# Patient Record
Sex: FEMALE | Race: BLACK OR AFRICAN AMERICAN | NOT HISPANIC OR LATINO | Employment: FULL TIME | URBAN - METROPOLITAN AREA
[De-identification: names, ages, dates, MRNs, and addresses within clinical notes are randomized per-mention and may not be internally consistent; named-entity substitution may affect disease eponyms.]

---

## 2017-01-12 RX ORDER — DROSPIRENONE AND ETHINYL ESTRADIOL TABLETS 0.02-3(28)
KIT ORAL
Qty: 28 TABLET | Refills: 0 | OUTPATIENT
Start: 2017-01-12

## 2017-01-19 ENCOUNTER — OFFICE VISIT (OUTPATIENT)
Dept: FAMILY MEDICINE | Facility: CLINIC | Age: 32
End: 2017-01-19
Payer: COMMERCIAL

## 2017-01-19 VITALS
OXYGEN SATURATION: 100 % | WEIGHT: 220.69 LBS | SYSTOLIC BLOOD PRESSURE: 124 MMHG | RESPIRATION RATE: 18 BRPM | HEIGHT: 63 IN | DIASTOLIC BLOOD PRESSURE: 82 MMHG | HEART RATE: 101 BPM | TEMPERATURE: 98 F | BODY MASS INDEX: 39.1 KG/M2

## 2017-01-19 DIAGNOSIS — J30.9 ALLERGIC RHINITIS, CAUSE UNSPECIFIED: Primary | ICD-10-CM

## 2017-01-19 DIAGNOSIS — J45.909 ASTHMA IN ADULT, UNSPECIFIED ASTHMA SEVERITY, UNCOMPLICATED: ICD-10-CM

## 2017-01-19 PROCEDURE — 96372 THER/PROPH/DIAG INJ SC/IM: CPT | Mod: S$GLB,,, | Performed by: REGISTERED NURSE

## 2017-01-19 PROCEDURE — 99999 PR PBB SHADOW E&M-EST. PATIENT-LVL III: CPT | Mod: PBBFAC,,, | Performed by: REGISTERED NURSE

## 2017-01-19 PROCEDURE — 1159F MED LIST DOCD IN RCRD: CPT | Mod: S$GLB,,, | Performed by: REGISTERED NURSE

## 2017-01-19 PROCEDURE — 99213 OFFICE O/P EST LOW 20 MIN: CPT | Mod: 25,S$GLB,, | Performed by: REGISTERED NURSE

## 2017-01-19 RX ORDER — PROMETHAZINE HYDROCHLORIDE AND DEXTROMETHORPHAN HYDROBROMIDE 6.25; 15 MG/5ML; MG/5ML
5 SYRUP ORAL
Qty: 180 ML | Refills: 0 | Status: SHIPPED | OUTPATIENT
Start: 2017-01-19 | End: 2017-03-09

## 2017-01-19 RX ORDER — BETAMETHASONE SODIUM PHOSPHATE AND BETAMETHASONE ACETATE 3; 3 MG/ML; MG/ML
12 INJECTION, SUSPENSION INTRA-ARTICULAR; INTRALESIONAL; INTRAMUSCULAR; SOFT TISSUE
Status: COMPLETED | OUTPATIENT
Start: 2017-01-19 | End: 2017-01-19

## 2017-01-19 RX ORDER — LEVOCETIRIZINE DIHYDROCHLORIDE 5 MG/1
5 TABLET, FILM COATED ORAL EVERY MORNING
Qty: 30 TABLET | Refills: 6 | Status: SHIPPED | OUTPATIENT
Start: 2017-01-19 | End: 2017-04-28

## 2017-01-19 RX ORDER — PANTOPRAZOLE SODIUM 40 MG/1
40 TABLET, DELAYED RELEASE ORAL DAILY
Refills: 6 | COMMUNITY
Start: 2016-11-25 | End: 2017-11-07

## 2017-01-19 RX ORDER — MONTELUKAST SODIUM 10 MG/1
10 TABLET ORAL NIGHTLY
Qty: 30 TABLET | Refills: 6 | Status: SHIPPED | OUTPATIENT
Start: 2017-01-19 | End: 2017-02-18

## 2017-01-19 RX ADMIN — BETAMETHASONE SODIUM PHOSPHATE AND BETAMETHASONE ACETATE 12 MG: 3; 3 INJECTION, SUSPENSION INTRA-ARTICULAR; INTRALESIONAL; INTRAMUSCULAR; SOFT TISSUE at 12:01

## 2017-01-19 NOTE — PROGRESS NOTES
"Subjective:       Patient ID: Ralph Hughes is a 31 y.o. female.    Chief Complaint: Cough and Asthma    HPI     Ralph is here today with c/o cough and cold symptoms x 1 week.  OTC meds help some but make too drowsy.  Reports RN, NC, sneezing, PND, itchy watery eyes.  Cough productive, with clear thick mucus, increased PM.  No wheezing, started MDI today.    Review of Systems   Constitutional: Negative for chills and fever.   HENT: Positive for congestion, postnasal drip, rhinorrhea and sneezing. Negative for ear pain, sinus pressure and sore throat.    Eyes: Positive for discharge and itching. Negative for photophobia, pain, redness and visual disturbance.   Respiratory: Positive for cough. Negative for shortness of breath and wheezing.    Cardiovascular: Negative.    Allergic/Immunologic: Positive for environmental allergies.   Neurological: Negative.    Hematological: Negative for adenopathy.       Objective:         Vitals:    01/19/17 1145   BP: 124/82   Pulse: 101   Resp: 18   Temp: 97.6 °F (36.4 °C)   TempSrc: Tympanic   SpO2: 100%   Weight: 100.1 kg (220 lb 10.9 oz)   Height: 5' 3" (1.6 m)       Physical Exam   Constitutional: She is oriented to person, place, and time. She appears well-developed and well-nourished.   HENT:   Head: Normocephalic and atraumatic.   Right Ear: Tympanic membrane, external ear and ear canal normal.   Left Ear: Tympanic membrane, external ear and ear canal normal.   Nose: Mucosal edema and rhinorrhea (boggy, clear RN) present. No epistaxis. Right sinus exhibits no maxillary sinus tenderness and no frontal sinus tenderness. Left sinus exhibits no maxillary sinus tenderness and no frontal sinus tenderness.   Mouth/Throat: Oropharynx is clear and moist.   Cardiovascular: Normal rate, regular rhythm and normal heart sounds.  Exam reveals no gallop and no friction rub.    No murmur heard.  Pulmonary/Chest: Effort normal and breath sounds normal. No respiratory distress. She " has no wheezes. She exhibits no tenderness.   Neurological: She is alert and oriented to person, place, and time.       Assessment:       1. Allergic rhinitis, cause unspecified    2. Asthma in adult, unspecified asthma severity, uncomplicated        Plan:       Ralph was seen today for cough and asthma.    Diagnoses and all orders for this visit:    Allergic rhinitis, cause unspecified  -     betamethasone acetate-betamethasone sodium phosphate injection 12 mg; Inject 2 mLs (12 mg total) into the muscle one time.  -     levocetirizine (XYZAL) 5 MG tablet; Take 1 tablet (5 mg total) by mouth every morning.  -     montelukast (SINGULAIR) 10 mg tablet; Take 1 tablet (10 mg total) by mouth every evening.  -     promethazine-dextromethorphan (PROMETHAZINE-DM) 6.25-15 mg/5 mL Syrp; Take 5 mLs by mouth every 4 to 6 hours as needed.    Asthma in adult, unspecified asthma severity, uncomplicated  -     betamethasone acetate-betamethasone sodium phosphate injection 12 mg; Inject 2 mLs (12 mg total) into the muscle one time.  -     montelukast (SINGULAIR) 10 mg tablet; Take 1 tablet (10 mg total) by mouth every evening.  -     promethazine-dextromethorphan (PROMETHAZINE-DM) 6.25-15 mg/5 mL Syrp; Take 5 mLs by mouth every 4 to 6 hours as needed.      Continue MDI as needed.  Symptomatic care, rest and fluids.  RTC prn.

## 2017-01-19 NOTE — MR AVS SNAPSHOT
[facility]  [address] [place] [address]  Phone: [phone]                  Ralph Hughes   2017 1:30 PM   Office Visit    Description:  Female : 1985   Provider:  Vinnie Dominique NP   Department:  [facility]           Reason for Visit     Cough     Asthma           Diagnoses this Visit        Comments    Allergic rhinitis, cause unspecified    -  Primary     Asthma in adult, unspecified asthma severity, uncomplicated                To Do List           Future Appointments        Provider Department Dept Phone    2017 1:30 PM Vinnie Dominique NP [facility] [phone]      Goals (5 Years of Data)     None       These Medications        Disp Refills Start End    levocetirizine (XYZAL) 5 MG tablet 30 tablet 6 2017    Take 1 tablet (5 mg total) by mouth every morning. - Oral    Pharmacy: [facility]/pharmacy #[id number] MENDY Talbot [address] Ph #: [phone]       montelukast (SINGULAIR) 10 mg tablet 30 tablet 6 2017    Take 1 tablet (10 mg total) by mouth every evening. - Oral    Pharmacy: [facility]/pharmacy #[id number] MENDY Talbot [address] Ph #: [phone]       promethazine-dextromethorphan (PROMETHAZINE-DM) 6.25-15 mg/5 mL Syrp 180 mL 0 2017     Take 5 mLs by mouth every 4 to 6 hours as needed. - Oral    Pharmacy: [facility]/pharmacy #[id number] MENDY Talbot [address] Ph #: [phone]         [facility]     [facility] On Call Nurse Care Line -  Assistance  Registered nurses in the [facility] provide clinical advisement, health education, appointment booking, and other advisory services.  Call for this free service at [phone].             Medications           Message regarding Medications     Verify the changes and/or additions to your medication regime listed below are the same as discussed with your clinician today.   "If any of these changes or additions are incorrect, please notify your healthcare provider.        START taking these NEW medications        Refills    levocetirizine (XYZAL) 5 MG tablet 6    Sig: Take 1 tablet (5 mg total) by mouth every morning.    Class: Normal    Route: Oral    montelukast (SINGULAIR) 10 mg tablet 6    Sig: Take 1 tablet (10 mg total) by mouth every evening.    Class: Normal    Route: Oral    promethazine-dextromethorphan (PROMETHAZINE-DM) 6.25-15 mg/5 mL Syrp 0    Sig: Take 5 mLs by mouth every 4 to 6 hours as needed.    Class: Normal    Route: Oral      These medications were administered today        Dose Freq    betamethasone acetate-betamethasone sodium phosphate injection 12 mg 12 mg Clinic[facility] 1 time    Sig: Inject 2 mLs (12 mg total) into the muscle one time.    Class: Normal    Route: Intramuscular           Verify that the below list of medications is an accurate representation of the medications you are currently taking.  If none reported, the list may be blank. If incorrect, please contact your healthcare provider. Carry this list with you in case of emergency.           Current Medications     pantoprazole (PROTONIX) 40 MG tablet Take 40 mg by mouth once daily.    drospirenone-ethinyl estradiol (NANI) 3-0.02 mg per tablet Take 1 tablet by mouth once daily.    levocetirizine (XYZAL) 5 MG tablet Take 1 tablet (5 mg total) by mouth every morning.    montelukast (SINGULAIR) 10 mg tablet Take 1 tablet (10 mg total) by mouth every evening.    promethazine-dextromethorphan (PROMETHAZINE-DM) 6.25-15 mg/5 mL Syrp Take 5 mLs by mouth every 4 to 6 hours as needed.           Clinical Reference Information           Vital Signs - Last Recorded  Most recent update: 1/19/2017 11:46 AM by Rand Chen MA    BP Pulse Temp Resp Ht Wt    124/82 101 97.6 °F (36.4 °C) (Tympanic) 18 5' 3" (1.6 m) 100.1 kg (220 lb 10.9 oz)    LMP SpO2 Breastfeeding? BMI       01/15/2017 100% No 39.09 kg/m2       Blood " Pressure          Most Recent Value    BP  124/82      Allergies as of 1/19/2017     No Known Allergies      Immunizations Administered on Date of Encounter - 1/19/2017     None

## 2017-02-21 ENCOUNTER — TELEPHONE (OUTPATIENT)
Dept: FAMILY MEDICINE | Facility: CLINIC | Age: 32
End: 2017-02-21

## 2017-02-21 RX ORDER — TRAZODONE HYDROCHLORIDE 50 MG/1
50 TABLET ORAL NIGHTLY PRN
Qty: 30 TABLET | Refills: 2 | Status: SHIPPED | OUTPATIENT
Start: 2017-02-21 | End: 2017-04-28

## 2017-02-21 NOTE — TELEPHONE ENCOUNTER
----- Message from Mame Perez sent at 2/21/2017 10:15 AM CST -----  Contact: Pt  Pt states she is returning nurse call, please contact pt at [phone] or [phone]

## 2017-02-21 NOTE — TELEPHONE ENCOUNTER
Spoke w/ patient today.  Mother had been ill for some time, passed away last week.  Has not been resting well, Aleve-PM not helping.  Requests sleep med.  Also wishes to see someone to discuss anxiety/stress issues.  Will send in RX for trazodone to pharmacy.  Number provided for Dr. Venegas office for her to schedule appointment.

## 2017-03-09 ENCOUNTER — PATIENT MESSAGE (OUTPATIENT)
Dept: FAMILY MEDICINE | Facility: CLINIC | Age: 32
End: 2017-03-09

## 2017-03-09 ENCOUNTER — OFFICE VISIT (OUTPATIENT)
Dept: FAMILY MEDICINE | Facility: CLINIC | Age: 32
End: 2017-03-09
Payer: COMMERCIAL

## 2017-03-09 DIAGNOSIS — R39.9 UTI SYMPTOMS: ICD-10-CM

## 2017-03-09 DIAGNOSIS — N39.0 ACUTE UTI (URINARY TRACT INFECTION): Primary | ICD-10-CM

## 2017-03-09 LAB
BILIRUB SERPL-MCNC: NORMAL MG/DL
BLOOD URINE, POC: 250
COLOR, POC UA: YELLOW
GLUCOSE UR QL STRIP: NORMAL
KETONES UR QL STRIP: NEGATIVE
LEUKOCYTE ESTERASE URINE, POC: POSITIVE
NITRITE, POC UA: NEGATIVE
PH, POC UA: 6
PROTEIN, POC: 30
SPECIFIC GRAVITY, POC UA: 1.01
UROBILINOGEN, POC UA: NEGATIVE

## 2017-03-09 PROCEDURE — 99999 PR PBB SHADOW E&M-EST. PATIENT-LVL III: CPT | Mod: PBBFAC,,, | Performed by: REGISTERED NURSE

## 2017-03-09 PROCEDURE — 1160F RVW MEDS BY RX/DR IN RCRD: CPT | Mod: S$GLB,,, | Performed by: REGISTERED NURSE

## 2017-03-09 PROCEDURE — 99213 OFFICE O/P EST LOW 20 MIN: CPT | Mod: 25,S$GLB,, | Performed by: REGISTERED NURSE

## 2017-03-09 PROCEDURE — 81002 URINALYSIS NONAUTO W/O SCOPE: CPT | Mod: S$GLB,,, | Performed by: REGISTERED NURSE

## 2017-03-09 RX ORDER — CIPROFLOXACIN 250 MG/1
250 TABLET, FILM COATED ORAL 2 TIMES DAILY
Qty: 6 TABLET | Refills: 0 | Status: SHIPPED | OUTPATIENT
Start: 2017-03-09 | End: 2017-03-12

## 2017-03-09 RX ORDER — PHENAZOPYRIDINE HYDROCHLORIDE 200 MG/1
200 TABLET, FILM COATED ORAL
Qty: 6 TABLET | Refills: 0 | Status: SHIPPED | OUTPATIENT
Start: 2017-03-09 | End: 2017-03-11

## 2017-03-13 VITALS
HEART RATE: 99 BPM | RESPIRATION RATE: 18 BRPM | HEIGHT: 63 IN | SYSTOLIC BLOOD PRESSURE: 98 MMHG | OXYGEN SATURATION: 99 % | WEIGHT: 219.13 LBS | BODY MASS INDEX: 38.83 KG/M2 | TEMPERATURE: 99 F | DIASTOLIC BLOOD PRESSURE: 76 MMHG

## 2017-03-13 NOTE — PROGRESS NOTES
"Subjective:       Patient ID: Ralph Hughes is a 31 y.o. female.    Chief Complaint: Urinary Tract Infection    HPI     Ralph is here today with UTI symptoms x 4 to 5 days.  Drinking more water lately.  Reports burning w/ urination, odor and urgency.    Review of Systems   Constitutional: Negative for chills and fever.   Respiratory: Negative.    Cardiovascular: Negative.    Gastrointestinal: Negative for nausea and vomiting.   Genitourinary: Positive for dysuria, frequency and urgency. Negative for flank pain and hematuria.   Musculoskeletal: Negative for back pain.   Neurological: Negative.        Objective:         Vitals:    03/09/17 1629   BP: 98/76   Pulse: 99   Resp: 18   Temp: 99.4 °F (37.4 °C)   TempSrc: Tympanic   SpO2: 99%   Weight: 99.4 kg (219 lb 2.2 oz)   Height: 5' 3" (1.6 m)       Physical Exam   Constitutional: She is oriented to person, place, and time. She appears well-developed and well-nourished.   Abdominal: There is tenderness in the suprapubic area. There is no CVA tenderness.   Neurological: She is alert and oriented to person, place, and time.       Component      Latest Ref Rng & Units 3/9/2017   Color, UA       yellow   Specific [place], UA       1.010   pH, UA       6   WBC, UA       positive   Nitrite, UA       negative   Protein, UA       30   Glucose, UA       normal   Ketones, UA       negative   Urobilinogen, UA       negative   Bilirubin, UA       normal   RBC, UA       250       Assessment:       1. Acute UTI (urinary tract infection)    2. UTI symptoms        Plan:       Ralph was seen today for urinary tract infection.    Diagnoses and all orders for this visit:    Acute UTI (urinary tract infection)  -     ciprofloxacin HCl (CIPRO) 250 MG tablet; Take 1 tablet (250 mg total) by mouth 2 (two) times daily.  -     phenazopyridine (PYRIDIUM) 200 MG tablet; Take 1 tablet (200 mg total) by mouth 3 (three) times daily with meals.    UTI symptoms  -     POCT URINE DIPSTICK " WITHOUT MICROSCOPE  -     ciprofloxacin HCl (CIPRO) 250 MG tablet; Take 1 tablet (250 mg total) by mouth 2 (two) times daily.  -     phenazopyridine (PYRIDIUM) 200 MG tablet; Take 1 tablet (200 mg total) by mouth 3 (three) times daily with meals.      Infection triggers and prevention discussed.  RTC prn.

## 2017-03-23 ENCOUNTER — OFFICE VISIT (OUTPATIENT)
Dept: FAMILY MEDICINE | Facility: CLINIC | Age: 32
End: 2017-03-23
Payer: COMMERCIAL

## 2017-03-23 VITALS
TEMPERATURE: 99 F | BODY MASS INDEX: 39.14 KG/M2 | HEART RATE: 99 BPM | DIASTOLIC BLOOD PRESSURE: 82 MMHG | RESPIRATION RATE: 18 BRPM | HEIGHT: 63 IN | WEIGHT: 220.88 LBS | SYSTOLIC BLOOD PRESSURE: 132 MMHG | OXYGEN SATURATION: 98 %

## 2017-03-23 DIAGNOSIS — R20.2 PARESTHESIA: Primary | ICD-10-CM

## 2017-03-23 DIAGNOSIS — D50.9 IRON DEFICIENCY ANEMIA, UNSPECIFIED IRON DEFICIENCY ANEMIA TYPE: ICD-10-CM

## 2017-03-23 DIAGNOSIS — M79.10 MYALGIA: ICD-10-CM

## 2017-03-23 DIAGNOSIS — E55.9 VITAMIN D DEFICIENCY DISEASE: ICD-10-CM

## 2017-03-23 PROCEDURE — 1160F RVW MEDS BY RX/DR IN RCRD: CPT | Mod: S$GLB,,, | Performed by: REGISTERED NURSE

## 2017-03-23 PROCEDURE — 99999 PR PBB SHADOW E&M-EST. PATIENT-LVL III: CPT | Mod: PBBFAC,,, | Performed by: REGISTERED NURSE

## 2017-03-23 PROCEDURE — 99213 OFFICE O/P EST LOW 20 MIN: CPT | Mod: S$GLB,,, | Performed by: REGISTERED NURSE

## 2017-03-23 RX ORDER — PHENAZOPYRIDINE HYDROCHLORIDE 200 MG/1
TABLET, FILM COATED ORAL
Refills: 0 | COMMUNITY
Start: 2017-03-09 | End: 2017-03-23

## 2017-03-23 NOTE — MR AVS SNAPSHOT
[facility]  [address] [address]  Phone: [phone]                  Ralph Hughes   3/23/2017 4:45 PM   Office Visit    Description:  Female : 1985   Provider:  Vinnie Dominique NP   Department:  [facility]           Reason for Visit     Joint Pain     Tingling           Diagnoses this Visit        Comments    Paresthesia    -  Primary            To Do List           Goals (5 Years of Data)     None      [facility]     [facility] Nurse Care Line -  Assistance  Registered nurses in the [facility] On Call Center provide clinical advisement, health education, appointment booking, and other advisory services.  Call for this free service at [phone].             Medications           Message regarding Medications     Verify the changes and/or additions to your medication regime listed below are the same as discussed with your clinician today.  If any of these changes or additions are incorrect, please notify your healthcare provider.        STOP taking these medications     phenazopyridine (PYRIDIUM) 200 MG tablet TAKE 1 TABLET (200 MG TOTAL) BY MOUTH 3 (THREE) TIMES DAILY WITH MEALS.           Verify that the below list of medications is an accurate representation of the medications you are currently taking.  If none reported, the list may be blank. If incorrect, please contact your healthcare provider. Carry this list with you in case of emergency.           Current Medications     levocetirizine (XYZAL) 5 MG tablet Take 1 tablet (5 mg total) by mouth every morning.    pantoprazole (PROTONIX) 40 MG tablet Take 40 mg by mouth once daily.    trazodone (DESYREL) 50 MG tablet Take 1 tablet (50 mg total) by mouth nightly as needed for Insomnia.    drospirenone-ethinyl estradiol (NANI) 3-0.02 mg per tablet Take 1 tablet by mouth once daily.           Clinical Reference Information           Your Vitals Were     BP  "Pulse Temp Resp Height    132/82 (BP Location: Left arm, Patient Position: Sitting, BP Method: Manual) 99 98.5 °F (36.9 °C) (Tympanic) 18 5' 3" (1.6 m)    Weight Last Period SpO2 BMI    100.2 kg (220 lb 14.4 oz) 03/18/2017 (Exact Date) 98% 39.13 kg/m2      Blood Pressure          Most Recent Value    BP  132/82      Allergies as of 3/23/2017     No Known Allergies      Immunizations Administered on Date of Encounter - 3/23/2017     None      Language Assistance Services     ATTENTION: Language assistance services are available, free of charge. Please call [phone].      ATENCIÓN: Si jocyla shaquille, tiene a bo disposición servicios gratuitos de asistencia lingüística. Llame al [phone].     FELICIANO Ý: N?u b?n nói Ti?ng Vi?t, có các d?ch v? h? tr? ngôn ng? mi?n phí dành cho b?n. G?i s? [phone].         [facility] complies with applicable Federal civil rights laws and does not discriminate on the basis of race, color, national origin, age, disability, or sex.        "

## 2017-03-24 ENCOUNTER — LAB VISIT (OUTPATIENT)
Dept: LAB | Facility: HOSPITAL | Age: 32
End: 2017-03-24
Attending: REGISTERED NURSE
Payer: OTHER GOVERNMENT

## 2017-03-24 ENCOUNTER — PATIENT MESSAGE (OUTPATIENT)
Dept: FAMILY MEDICINE | Facility: CLINIC | Age: 32
End: 2017-03-24

## 2017-03-24 DIAGNOSIS — D50.9 IRON DEFICIENCY ANEMIA, UNSPECIFIED IRON DEFICIENCY ANEMIA TYPE: ICD-10-CM

## 2017-03-24 DIAGNOSIS — M79.10 MYALGIA: ICD-10-CM

## 2017-03-24 DIAGNOSIS — R20.2 PARESTHESIA: ICD-10-CM

## 2017-03-24 DIAGNOSIS — E55.9 VITAMIN D DEFICIENCY DISEASE: ICD-10-CM

## 2017-03-24 LAB
25(OH)D3+25(OH)D2 SERPL-MCNC: 17 NG/ML
ALBUMIN SERPL BCP-MCNC: 3.5 G/DL
ALP SERPL-CCNC: 75 U/L
ALT SERPL W/O P-5'-P-CCNC: 13 U/L
ANION GAP SERPL CALC-SCNC: 9 MMOL/L
AST SERPL-CCNC: 12 U/L
BASOPHILS # BLD AUTO: 0.01 K/UL
BASOPHILS NFR BLD: 0.2 %
BILIRUB SERPL-MCNC: 0.4 MG/DL
BUN SERPL-MCNC: 14 MG/DL
CALCIUM SERPL-MCNC: 9.1 MG/DL
CHLORIDE SERPL-SCNC: 104 MMOL/L
CO2 SERPL-SCNC: 27 MMOL/L
CREAT SERPL-MCNC: 0.9 MG/DL
DIFFERENTIAL METHOD: ABNORMAL
EOSINOPHIL # BLD AUTO: 0 K/UL
EOSINOPHIL NFR BLD: 0.9 %
ERYTHROCYTE [DISTWIDTH] IN BLOOD BY AUTOMATED COUNT: 14.1 %
EST. GFR  (AFRICAN AMERICAN): >60 ML/MIN/1.73 M^2
EST. GFR  (NON AFRICAN AMERICAN): >60 ML/MIN/1.73 M^2
GLUCOSE SERPL-MCNC: 102 MG/DL
HCT VFR BLD AUTO: 34.7 %
HGB BLD-MCNC: 11.6 G/DL
LYMPHOCYTES # BLD AUTO: 1.8 K/UL
LYMPHOCYTES NFR BLD: 41.3 %
MCH RBC QN AUTO: 28.3 PG
MCHC RBC AUTO-ENTMCNC: 33.4 %
MCV RBC AUTO: 85 FL
MONOCYTES # BLD AUTO: 0.6 K/UL
MONOCYTES NFR BLD: 13.5 %
NEUTROPHILS # BLD AUTO: 2 K/UL
NEUTROPHILS NFR BLD: 43.9 %
PLATELET # BLD AUTO: 247 K/UL
PMV BLD AUTO: 12.2 FL
POTASSIUM SERPL-SCNC: 3.9 MMOL/L
PROT SERPL-MCNC: 7.3 G/DL
RBC # BLD AUTO: 4.1 M/UL
SODIUM SERPL-SCNC: 140 MMOL/L
VIT B12 SERPL-MCNC: 381 PG/ML
WBC # BLD AUTO: 4.45 K/UL

## 2017-03-24 PROCEDURE — 80053 COMPREHEN METABOLIC PANEL: CPT

## 2017-03-24 PROCEDURE — 82306 VITAMIN D 25 HYDROXY: CPT

## 2017-03-24 PROCEDURE — 36415 COLL VENOUS BLD VENIPUNCTURE: CPT | Mod: PO

## 2017-03-24 PROCEDURE — 85025 COMPLETE CBC W/AUTO DIFF WBC: CPT

## 2017-03-24 PROCEDURE — 82607 VITAMIN B-12: CPT

## 2017-03-26 NOTE — PROGRESS NOTES
"Subjective:       Patient ID: Ralph Hughes is a 31 y.o. female.    Chief Complaint: Joint Pain and Tingling (painful )    HPI     Ralph is here today with c/o joint/muscle pain, tingling and fatigue.  Tingling generalized -- to toes, head, neck, jaw, lip and hands.  Sleeping well despite aches and pains.  Not taking Vitamin-D or MVI.        Component      Latest Ref Rng & Units 10/13/2016   WBC      3.90 - 12.70 K/uL 5.45   RBC      4.00 - 5.40 M/uL 4.04   Hemoglobin      12.0 - 16.0 g/dL 11.6 (L)   Hematocrit      37.0 - 48.5 % 34.3 (L)   MCV      82 - 98 fL 85   MCH      27.0 - 31.0 pg 28.7   MCHC      32.0 - 36.0 % 33.8   RDW      11.5 - 14.5 % 13.4   Platelets      150 - 350 K/uL 207   MPV      9.2 - 12.9 fL 11.5   Gran #      1.8 - 7.7 K/uL 3.8   Lymph #      1.0 - 4.8 K/uL 1.3   Mono #      0.3 - 1.0 K/uL 0.4   Eos #      0.0 - 0.5 K/uL 0.0   Baso #      0.00 - 0.20 K/uL 0.00   Gran%      38.0 - 73.0 % 69.7   Lymph%      18.0 - 48.0 % 23.5   Mono%      4.0 - 15.0 % 6.6   Eosinophil%      0.0 - 8.0 % 0.2   Basophil%      0.0 - 1.9 % 0.0       Component      Latest Ref Rng & Units 9/13/2013   Vit D, 25-Hydroxy      30 - 96 ng/mL 18 (L)       Review of Systems   Constitutional: Positive for activity change and fatigue. Negative for chills and fever.   Respiratory: Negative.    Cardiovascular: Negative.    Musculoskeletal: Positive for arthralgias and myalgias. Negative for gait problem and joint swelling.   Neurological: Negative for weakness, numbness and headaches.   Psychiatric/Behavioral: Negative for sleep disturbance.       Objective:       Vitals:    03/23/17 1649   BP: 132/82   BP Location: Left arm   Patient Position: Sitting   BP Method: Manual   Pulse: 99   Resp: 18   Temp: 98.5 °F (36.9 °C)   TempSrc: Tympanic   SpO2: 98%   Weight: 100.2 kg (220 lb 14.4 oz)   Height: 5' 3" (1.6 m)       Physical Exam   Constitutional: She is oriented to person, place, and time. She appears well-developed and " well-nourished.   Cardiovascular: Normal rate, regular rhythm and normal heart sounds.    Pulmonary/Chest: Effort normal and breath sounds normal.   Musculoskeletal: Normal range of motion. She exhibits no edema or deformity.   Neurological: She is alert and oriented to person, place, and time.   Skin: Skin is warm and dry.       Assessment:       1. Paresthesia    2. Myalgia    3. Iron deficiency anemia, unspecified iron deficiency anemia type    4. Vitamin D deficiency disease        Plan:       Ralph was seen today for joint pain and tingling.    Diagnoses and all orders for this visit:    Paresthesia  -     CBC auto differential; Future  -     Comprehensive metabolic panel; Future  -     Vitamin B12; Future  -     Vitamin D; Future    Myalgia  -     CBC auto differential; Future  -     Comprehensive metabolic panel; Future  -     Vitamin B12; Future  -     Vitamin D; Future    Iron deficiency anemia, unspecified iron deficiency anemia type  -     CBC auto differential; Future    Vitamin D deficiency disease  -     Vitamin D; Future      Lab pending.  RTC prn.

## 2017-03-27 ENCOUNTER — PATIENT MESSAGE (OUTPATIENT)
Dept: FAMILY MEDICINE | Facility: CLINIC | Age: 32
End: 2017-03-27

## 2017-03-27 ENCOUNTER — TELEPHONE (OUTPATIENT)
Dept: FAMILY MEDICINE | Facility: CLINIC | Age: 32
End: 2017-03-27

## 2017-03-27 RX ORDER — ASPIRIN 325 MG
50000 TABLET, DELAYED RELEASE (ENTERIC COATED) ORAL WEEKLY
Qty: 4 CAPSULE | Refills: 6 | Status: SHIPPED | OUTPATIENT
Start: 2017-03-27 | End: 2019-12-20

## 2017-03-27 NOTE — TELEPHONE ENCOUNTER
Labs reviewed:  1.  Iron borderline low.  2.  Vitamin-D sig low at 17 (needs to be at least 30).  RX sent to pharmacy.   Recheck lab in 3 mo.

## 2017-03-27 NOTE — TELEPHONE ENCOUNTER
Spoke with patient, aware of result and express understood. Patient will call back in 3 month to schedule to have labs repeated

## 2017-04-27 ENCOUNTER — PATIENT MESSAGE (OUTPATIENT)
Dept: OBSTETRICS AND GYNECOLOGY | Facility: CLINIC | Age: 32
End: 2017-04-27

## 2017-04-28 ENCOUNTER — LAB VISIT (OUTPATIENT)
Dept: LAB | Facility: HOSPITAL | Age: 32
End: 2017-04-28
Attending: OBSTETRICS & GYNECOLOGY
Payer: COMMERCIAL

## 2017-04-28 ENCOUNTER — OFFICE VISIT (OUTPATIENT)
Dept: OBSTETRICS AND GYNECOLOGY | Facility: CLINIC | Age: 32
End: 2017-04-28
Payer: COMMERCIAL

## 2017-04-28 VITALS
BODY MASS INDEX: 38.51 KG/M2 | WEIGHT: 217.38 LBS | DIASTOLIC BLOOD PRESSURE: 72 MMHG | SYSTOLIC BLOOD PRESSURE: 112 MMHG

## 2017-04-28 DIAGNOSIS — Z32.01 POSITIVE PREGNANCY TEST: ICD-10-CM

## 2017-04-28 DIAGNOSIS — Z32.01 POSITIVE PREGNANCY TEST: Primary | ICD-10-CM

## 2017-04-28 DIAGNOSIS — O09.11 PREGNANCY WITH HISTORY OF ECTOPIC PREGNANCY, ANTEPARTUM, FIRST TRIMESTER: ICD-10-CM

## 2017-04-28 DIAGNOSIS — O34.219 HISTORY OF CESAREAN DELIVERY, CURRENTLY PREGNANT: ICD-10-CM

## 2017-04-28 PROBLEM — O09.10 PREGNANCY WITH HISTORY OF ECTOPIC PREGNANCY, ANTEPARTUM: Status: ACTIVE | Noted: 2017-04-28

## 2017-04-28 LAB
ABO + RH BLD: NORMAL
BASOPHILS # BLD AUTO: 0.02 K/UL
BASOPHILS NFR BLD: 0.5 %
BLD GP AB SCN CELLS X3 SERPL QL: NORMAL
DIFFERENTIAL METHOD: ABNORMAL
EOSINOPHIL # BLD AUTO: 0 K/UL
EOSINOPHIL NFR BLD: 0.2 %
ERYTHROCYTE [DISTWIDTH] IN BLOOD BY AUTOMATED COUNT: 13.5 %
HCG INTACT+B SERPL-ACNC: 270 MIU/ML
HCT VFR BLD AUTO: 34.9 %
HGB BLD-MCNC: 11.9 G/DL
LYMPHOCYTES # BLD AUTO: 1.6 K/UL
LYMPHOCYTES NFR BLD: 37.9 %
MCH RBC QN AUTO: 28.3 PG
MCHC RBC AUTO-ENTMCNC: 34.1 %
MCV RBC AUTO: 83 FL
MONOCYTES # BLD AUTO: 0.5 K/UL
MONOCYTES NFR BLD: 11.7 %
NEUTROPHILS # BLD AUTO: 2.1 K/UL
NEUTROPHILS NFR BLD: 49.5 %
PLATELET # BLD AUTO: 244 K/UL
PMV BLD AUTO: 12.5 FL
RBC # BLD AUTO: 4.2 M/UL
WBC # BLD AUTO: 4.27 K/UL

## 2017-04-28 PROCEDURE — 86762 RUBELLA ANTIBODY: CPT

## 2017-04-28 PROCEDURE — 36415 COLL VENOUS BLD VENIPUNCTURE: CPT | Mod: PO

## 2017-04-28 PROCEDURE — 85025 COMPLETE CBC W/AUTO DIFF WBC: CPT

## 2017-04-28 PROCEDURE — 80074 ACUTE HEPATITIS PANEL: CPT

## 2017-04-28 PROCEDURE — 84702 CHORIONIC GONADOTROPIN TEST: CPT

## 2017-04-28 PROCEDURE — 99213 OFFICE O/P EST LOW 20 MIN: CPT | Mod: S$GLB,,, | Performed by: OBSTETRICS & GYNECOLOGY

## 2017-04-28 PROCEDURE — 83036 HEMOGLOBIN GLYCOSYLATED A1C: CPT

## 2017-04-28 PROCEDURE — 86901 BLOOD TYPING SEROLOGIC RH(D): CPT

## 2017-04-28 PROCEDURE — 86703 HIV-1/HIV-2 1 RESULT ANTBDY: CPT

## 2017-04-28 PROCEDURE — 1160F RVW MEDS BY RX/DR IN RCRD: CPT | Mod: S$GLB,,, | Performed by: OBSTETRICS & GYNECOLOGY

## 2017-04-28 PROCEDURE — 81025 URINE PREGNANCY TEST: CPT | Mod: QW,S$GLB,, | Performed by: OBSTETRICS & GYNECOLOGY

## 2017-04-28 PROCEDURE — 86592 SYPHILIS TEST NON-TREP QUAL: CPT

## 2017-04-28 PROCEDURE — 99999 PR PBB SHADOW E&M-EST. PATIENT-LVL II: CPT | Mod: PBBFAC,,, | Performed by: OBSTETRICS & GYNECOLOGY

## 2017-04-28 PROCEDURE — 86900 BLOOD TYPING SEROLOGIC ABO: CPT

## 2017-04-28 NOTE — MR AVS SNAPSHOT
[facility]  [address] [place] [address]  Phone: [phone]  Fax: [phone]                  Ralph Hughes   2017 8:00 AM   Office Visit    Description:  Female : 1985   Provider:  Clay Keene MD   Department:  [facility] - OB/ GYN           Reason for Visit     Possible Pregnancy           Diagnoses this Visit        Comments    Positive pregnancy test    -  Primary     History of  delivery, currently pregnant         Pregnancy with history of ectopic pregnancy, antepartum, first trimester                To Do List           Future Appointments        Provider Department Dept Phone    2017 9:40 AM SPECIMEN, [facility] [phone]    2017 11:20 AM LABORATORY, [facility] [phone]    5/15/2017 2:30 PM ULTRASOUND, OB-GYN [facility] OB/ -[phone]    5/15/2017 3:00 PM Chanel Arizmendi CNM [facility] OB/ -[phone]      Goals (5 Years of Data)     None      Follow-Up and Disposition     Return in about 2 weeks (around 2017) for Initial OB with CNM.      [facility]     [facility] Nurse Care Line -  Assistance  Unless otherwise directed by your provider, please contact [facility], our nurse care line that is available for  assistance.     Registered nurses in the [facility] provide: appointment scheduling, clinical advisement, health education, and other advisory services.  Call: [phone] (toll free)               Medications           Message regarding Medications     Verify the changes and/or additions to your medication regime listed below are the same as discussed with your clinician today.  If any of these changes or additions are incorrect, please notify your healthcare provider.        STOP taking these medications     drospirenone-ethinyl estradiol (NANI) 3-0.02 mg per tablet Take 1 tablet by mouth once daily.    levocetirizine (XYZAL) 5 MG  tablet Take 1 tablet (5 mg total) by mouth every morning.    trazodone (DESYREL) 50 MG tablet Take 1 tablet (50 mg total) by mouth nightly as needed for Insomnia.           Verify that the below list of medications is an accurate representation of the medications you are currently taking.  If none reported, the list may be blank. If incorrect, please contact your healthcare provider. Carry this list with you in case of emergency.           Current Medications     cholecalciferol, vitamin D3, 50,000 unit capsule Take 1 capsule (50,000 Units total) by mouth once a week.    pantoprazole (PROTONIX) 40 MG tablet Take 40 mg by mouth once daily.           Clinical Reference Information           Your Vitals Were     BP Weight Last Period BMI       112/72 98.6 kg (217 lb 6 oz) 03/18/2017 38.51 kg/m2       Blood Pressure          Most Recent Value    BP  112/72      Allergies as of 4/28/2017     No Known Allergies      Immunizations Administered on Date of Encounter - 4/28/2017     None      Orders Placed During Today's Visit      Normal Orders This Visit    POCT urine pregnancy     Future Labs/Procedures Expected by Expires    CBC auto differential  4/28/2017 4/28/2018    hCG, quantitative  4/28/2017 6/27/2018    Hemoglobin A1c  4/28/2017 6/27/2018    Hepatitis panel, acute  4/28/2017 4/28/2018    HIV-1 and HIV-2 antibodies  4/28/2017 4/28/2018    RPR  4/28/2017 4/28/2018    Rubella antibody, IgG  4/28/2017 4/28/2018    Type & Screen  4/28/2017 4/28/2018    Urine culture  4/28/2017 4/28/2018    US OB/GYN Procedure ([facility])  As directed 4/28/2018      Language Assistance Services     ATTENTION: Language assistance services are available, free of charge. Please call [phone].      ATENCIÓN: Si habla español, tiene a bo disposición servicios gratuitos de asistencia lingüística. Llame al [phone].     CHÚ Ý: N?u b?n nói Ti?ng Vi?t, có các d?ch v? h? tr? ngôn ng? mi?n phí dành cho b?n. G?i s? [phone].          [facility] complies with applicable Federal civil rights laws and does not discriminate on the basis of race, color, national origin, age, disability, or sex.

## 2017-04-28 NOTE — PROGRESS NOTES
CHIEF COMPLAINT:   Patient presents with      Possible Pregnancy        HISTORY OF PRESENT ILLNESS  Ralph Hughes 31 y.o.  presents for pregnancy risk assessment.   The patient has no complaints today.  No nausea or vomiting. No bleeding or pain.  Pregnancy was not planned but is desired.  Maria Alejandra is supportive of pregnancy.  Lives at home with maria alejandra, her 2 daughters, and her maria alejandra's daughter.  No pets at home.  Works at [employer].  Denies domestic abuse.  Denies chemical/pesticide/radiation exposure.  OB history:  1.  C/S at term (no complications - [facility])  2.  C/S at 36 (PPROM and possible abruption); [place]  3.  Left Ectopic (LSC Left Salpingectomy with Dr. COLEMAN Pang)       LMP: Patient's last menstrual period was 2017.  EDC: Estimated Date of Delivery: 17  EGA: 5w6d       Health Maintenance   Topic Date Due    Lipid Panel  1987    TETANUS VACCINE  2005    Influenza Vaccine  2016    Pap Smear  2017       Past Medical History:   Diagnosis Date    Herpes simplex virus (HSV) infection        Past Surgical History:   Procedure Laterality Date     SECTION, CLASSIC  03/05/2009    X 2    Ectopic Pregnacy Removal  10/13/2016       Family History   Problem Relation Age of Onset    Hypertension Mother     Cancer Mother     Breast cancer Neg Hx     Colon cancer Neg Hx     Ovarian cancer Neg Hx     Thrombophilia Neg Hx        Social History     Social History    Marital status: Legally      Spouse name: N/A    Number of children: N/A    Years of education: N/A     Occupational History     [employer]     Social History Main Topics    Smoking status: Never Smoker    Smokeless tobacco: None    Alcohol use No    Drug use: No    Sexual activity: Yes     Partners: Male     Birth control/ protection: None     Other Topics Concern    None     Social History Narrative    She works at [employer] as a  .       Current Outpatient Prescriptions   Medication Sig Dispense Refill    cholecalciferol, vitamin D3, 50,000 unit capsule Take 1 capsule (50,000 Units total) by mouth once a week. 4 capsule 6    pantoprazole (PROTONIX) 40 MG tablet Take 40 mg by mouth once daily.  6     No current facility-administered medications for this visit.        Review of patient's allergies indicates:  No Known Allergies      PHYSICAL EXAM   Vitals:    04/28/17 0838   BP: 112/72        PAIN SCALE: 0/10 None    PHYSICAL EXAM    ROS:  GENERAL: No fever, chills, fatigability or weight loss.  CV: Denies chest pain  PULM: Denies shortness of breath or wheezing.  ABDOMEN: Appetite fine. No weight loss. Denies diarrhea, abdominal pain, hematemesis or blood in stool.  URINARY: No flank pain, dysuria or hematuria.  REPRODUCTIVE: No abnormal vaginal bleeding.       PE:   APPEARANCE: Well nourished, well developed, in no acute distress  CHEST: Clear to auscultation bilaterally  CV: Regular rate and rhythm  BREASTS: Symmetrical, no skin changes or visible lesions. No palpable masses, nipple discharge or adenopathy bilaterally.  ABDOMEN: Soft. No tenderness or masses. No hepatosplenomegaly. No hernias  PELVIC:  Deferred    UPT + (faint)    A/P:  Positive pregnancy test  -     CBC auto differential; Future; Expected date: 4/28/17  -     Hepatitis panel, acute; Future; Expected date: 4/28/17  -     HIV-1 and HIV-2 antibodies; Future; Expected date: 4/28/17  -     POCT urine pregnancy  -     RPR; Future; Expected date: 4/28/17  -     Rubella antibody, IgG; Future; Expected date: 4/28/17  -     Type & Screen; Future; Expected date: 4/28/17  -     Hemoglobin A1c; Future; Expected date: 4/28/17  -     Urine culture; Future; Expected date: 4/28/17  -     US OB/GYN Procedure (Viewpoint); Future  -     hCG, quantitative; Future; Expected date: 4/28/17  -      Patient was counseled today on A.C.S. Pap guidelines and recommendations for yearly  pelvic exams, mammograms and monthly self breast exams; to see her PCP for other health maintenance and pregnancy.   -      Patient's medications and medical history reviewed with patient and implications in pregnancy.   -      Pregnancy course discussed and 'AtoZ' book given. Patient was counseled on proper weight gain based on the [place] of [employer]'s recommendations based on her pre-pregnancy weight. Discussed foods to avoid in pregnancy (i.e. sushi, fish that are high in mercury, deli meat, and unpasteurized cheeses). Discussed prenatal vitamin options (i.e. stool softener, DHA). Discussed potential medical problems in pregnancy.  -     Discussed risk of Toxoplasmosis transmission from pets and reviewed risk reduction techniques.  -     Chromosomal abnormality risk discussed and available testing offered.   -     Pt was counseled on exercise in pregnancy and weight gain recommendations.  -     Pt was counseled on travel recommendations and on risks of Zika virus exposure.  Current CDC Zika advisories and prevention techniques were reviewed with pt.  Pt denies any recent international travel and does not plan travel during pregnancy.  Pt reports that partner does not plan travel either.  -     Oriented to practice incl CNM collaboration.   -     Follow-up initial OB, w/labs and u/s.     History of  delivery, currently pregnant  -     Prior C/S x 2.  Pt counseled on risks and delivery options.  Pt desires repeat C/S.    Pregnancy with history of ectopic pregnancy, antepartum, first trimester  -     Pt counseled on risks.       Return in about 2 weeks (around 2017) for Initial OB with CNM.

## 2017-04-29 LAB
ESTIMATED AVG GLUCOSE: 117 MG/DL
HBA1C MFR BLD HPLC: 5.7 %
RPR SER QL: NORMAL

## 2017-05-01 ENCOUNTER — PATIENT MESSAGE (OUTPATIENT)
Dept: OBSTETRICS AND GYNECOLOGY | Facility: CLINIC | Age: 32
End: 2017-05-01

## 2017-05-01 LAB
HAV IGM SERPL QL IA: NEGATIVE
HBV CORE IGM SERPL QL IA: NEGATIVE
HBV SURFACE AG SERPL QL IA: NEGATIVE
HCV AB SERPL QL IA: NEGATIVE
HIV 1+2 AB+HIV1 P24 AG SERPL QL IA: NEGATIVE
RUBV IGG SER-ACNC: 47.8 IU/ML
RUBV IGG SER-IMP: REACTIVE

## 2017-05-03 ENCOUNTER — PATIENT MESSAGE (OUTPATIENT)
Dept: OBSTETRICS AND GYNECOLOGY | Facility: CLINIC | Age: 32
End: 2017-05-03

## 2017-05-15 ENCOUNTER — PROCEDURE VISIT (OUTPATIENT)
Dept: OBSTETRICS AND GYNECOLOGY | Facility: CLINIC | Age: 32
End: 2017-05-15
Payer: COMMERCIAL

## 2017-05-15 ENCOUNTER — INITIAL PRENATAL (OUTPATIENT)
Dept: OBSTETRICS AND GYNECOLOGY | Facility: CLINIC | Age: 32
End: 2017-05-15
Payer: COMMERCIAL

## 2017-05-15 VITALS
BODY MASS INDEX: 39.37 KG/M2 | WEIGHT: 222.25 LBS | DIASTOLIC BLOOD PRESSURE: 68 MMHG | SYSTOLIC BLOOD PRESSURE: 100 MMHG

## 2017-05-15 DIAGNOSIS — O99.211 OBESITY COMPLICATING PREGNANCY, FIRST TRIMESTER: Primary | ICD-10-CM

## 2017-05-15 DIAGNOSIS — Z34.80 SUPERVISION OF OTHER NORMAL PREGNANCY: ICD-10-CM

## 2017-05-15 DIAGNOSIS — Z32.01 POSITIVE PREGNANCY TEST: ICD-10-CM

## 2017-05-15 DIAGNOSIS — Z98.891 PREVIOUS CESAREAN SECTION: ICD-10-CM

## 2017-05-15 PROBLEM — O99.210 OBESITY COMPLICATING PREGNANCY: Status: ACTIVE | Noted: 2017-05-15

## 2017-05-15 PROCEDURE — 88175 CYTOPATH C/V AUTO FLUID REDO: CPT

## 2017-05-15 PROCEDURE — 0500F INITIAL PRENATAL CARE VISIT: CPT | Mod: S$GLB,,, | Performed by: ADVANCED PRACTICE MIDWIFE

## 2017-05-15 PROCEDURE — 76801 OB US < 14 WKS SINGLE FETUS: CPT | Mod: S$GLB,,, | Performed by: OBSTETRICS & GYNECOLOGY

## 2017-05-15 PROCEDURE — 87591 N.GONORRHOEAE DNA AMP PROB: CPT

## 2017-05-15 PROCEDURE — 99999 PR PBB SHADOW E&M-EST. PATIENT-LVL II: CPT | Mod: PBBFAC,,, | Performed by: ADVANCED PRACTICE MIDWIFE

## 2017-05-15 NOTE — MR AVS SNAPSHOT
[facility]  [address] [place] [address]  Phone: [phone]  Fax: [phone]                  Ralph Hughes   5/15/2017 3:00 PM   Initial Prenatal    Description:  Female : 1985   Provider:  Chanel Arizmendi CNM   Department:  [facility]           Reason for Visit     Initial Prenatal Visit           Diagnoses this Visit        Comments    Obesity complicating pregnancy, first trimester    -  Primary     Previous  section         Supervision of other normal pregnancy                To Do List           Future Appointments        Provider Department Dept Phone    2017 2:30 PM Chanel Arizmendi CNM [facility] - [facility]/ -[phone]      Goals (5 Years of Data)     None      Follow-Up and Disposition     Return in about 4 weeks (around 2017).    Follow-up and Disposition History      [facility]     [facility] Care Line - 24/ Assistance  Unless otherwise directed by your provider, please contact [facility], our nurse care line that is available for  assistance.     Registered nurses in the [facility] On Call Center provide: appointment scheduling, clinical advisement, health education, and other advisory services.  Call: [phone] (toll free)               Medications           Message regarding Medications     Verify the changes and/or additions to your medication regime listed below are the same as discussed with your clinician today.  If any of these changes or additions are incorrect, please notify your healthcare provider.             Verify that the below list of medications is an accurate representation of the medications you are currently taking.  If none reported, the list may be blank. If incorrect, please contact your healthcare provider. Carry this list with you in case of emergency.           Current Medications     cholecalciferol, vitamin D3, 50,000 unit capsule Take 1 capsule (50,000 Units total) by mouth once a week.     pantoprazole (PROTONIX) 40 MG tablet Take 40 mg by mouth once daily.           Clinical Reference Information           Prenatal Vitals     Enc. Date GA Prenatal Vitals Prenatal Pulse Pain Level Urine Albumin/Glucose Edema Presentation Dilation/Effacement/Station    5/15/17 6w5d 100/68 / 100.8 kg (222 lb 3.6 oz)  / us / Absent             TW.8 kg (1 lb 12.2 oz)   Pregravid weight: 100 kg (220 lb 7.4 oz)       Your Vitals Were     BP Weight Last Period BMI       100/68 100.8 kg (222 lb 3.6 oz) 2017 39.37 kg/m2       Allergies as of 5/15/2017     No Known Allergies      Immunizations Administered on Date of Encounter - 5/15/2017     None      Orders Placed During Today's Visit      Normal Orders This Visit    C. trachomatis/N. gonorrhoeae by AMP DNA Cervix     Liquid-based pap smear, screening       Language Assistance Services     ATTENTION: Language assistance services are available, free of charge. Please call [phone].      ATENCIÓN: Si habla shaquille, tiene a bo disposición servicios gratuitos de asistencia lingüística. Llame al [phone].     FELICIANO Ý: N?u b?n nói Ti?ng Vi?t, có các d?ch v? h? tr? ngôn ng? mi?n phí dành cho b?n. G?i s? [phone].         [facility] complies with applicable Federal civil rights laws and does not discriminate on the basis of race, color, national origin, age, disability, or sex.

## 2017-05-15 NOTE — PROGRESS NOTES
NOB-s<d Prior  x 2  sono and labs reviewed  Pap and genprobe collected  Desires Dr LIV Pang for C section

## 2017-05-16 ENCOUNTER — PATIENT MESSAGE (OUTPATIENT)
Dept: OBSTETRICS AND GYNECOLOGY | Facility: CLINIC | Age: 32
End: 2017-05-16

## 2017-05-16 LAB
C TRACH DNA SPEC QL NAA+PROBE: NOT DETECTED
N GONORRHOEA DNA SPEC QL NAA+PROBE: NOT DETECTED

## 2017-05-17 ENCOUNTER — PATIENT MESSAGE (OUTPATIENT)
Dept: OBSTETRICS AND GYNECOLOGY | Facility: CLINIC | Age: 32
End: 2017-05-17

## 2017-05-17 RX ORDER — PROMETHAZINE HYDROCHLORIDE 25 MG/1
25 TABLET ORAL EVERY 4 HOURS
Qty: 30 TABLET | Refills: 1 | Status: SHIPPED | OUTPATIENT
Start: 2017-05-17 | End: 2017-11-07

## 2017-05-17 RX ORDER — ONDANSETRON 4 MG/1
4 TABLET, FILM COATED ORAL DAILY PRN
Qty: 30 TABLET | Refills: 1 | Status: SHIPPED | OUTPATIENT
Start: 2017-05-17 | End: 2017-11-07

## 2017-05-22 ENCOUNTER — PATIENT MESSAGE (OUTPATIENT)
Dept: OBSTETRICS AND GYNECOLOGY | Facility: CLINIC | Age: 32
End: 2017-05-22

## 2017-06-12 ENCOUNTER — ROUTINE PRENATAL (OUTPATIENT)
Dept: OBSTETRICS AND GYNECOLOGY | Facility: CLINIC | Age: 32
End: 2017-06-12
Payer: COMMERCIAL

## 2017-06-12 VITALS — SYSTOLIC BLOOD PRESSURE: 113 MMHG | WEIGHT: 224 LBS | DIASTOLIC BLOOD PRESSURE: 73 MMHG | BODY MASS INDEX: 39.68 KG/M2

## 2017-06-12 DIAGNOSIS — Z98.891 PREVIOUS CESAREAN SECTION: ICD-10-CM

## 2017-06-12 DIAGNOSIS — Z34.80 SUPERVISION OF OTHER NORMAL PREGNANCY: Primary | ICD-10-CM

## 2017-06-12 PROCEDURE — 0502F SUBSEQUENT PRENATAL CARE: CPT | Mod: S$GLB,,, | Performed by: ADVANCED PRACTICE MIDWIFE

## 2017-06-12 PROCEDURE — 99999 PR PBB SHADOW E&M-EST. PATIENT-LVL II: CPT | Mod: PBBFAC,,, | Performed by: ADVANCED PRACTICE MIDWIFE

## 2017-06-23 ENCOUNTER — PATIENT MESSAGE (OUTPATIENT)
Dept: OBSTETRICS AND GYNECOLOGY | Facility: CLINIC | Age: 32
End: 2017-06-23

## 2017-07-10 ENCOUNTER — ROUTINE PRENATAL (OUTPATIENT)
Dept: OBSTETRICS AND GYNECOLOGY | Facility: CLINIC | Age: 32
End: 2017-07-10
Payer: COMMERCIAL

## 2017-07-10 VITALS
BODY MASS INDEX: 40.15 KG/M2 | SYSTOLIC BLOOD PRESSURE: 126 MMHG | DIASTOLIC BLOOD PRESSURE: 84 MMHG | WEIGHT: 226.63 LBS

## 2017-07-10 DIAGNOSIS — Z34.80 SUPERVISION OF OTHER NORMAL PREGNANCY: Primary | ICD-10-CM

## 2017-07-10 DIAGNOSIS — Z98.891 PREVIOUS CESAREAN SECTION: ICD-10-CM

## 2017-07-10 PROCEDURE — 99999 PR PBB SHADOW E&M-EST. PATIENT-LVL II: CPT | Mod: PBBFAC,,, | Performed by: ADVANCED PRACTICE MIDWIFE

## 2017-07-10 PROCEDURE — 99212 OFFICE O/P EST SF 10 MIN: CPT | Mod: TH,S$GLB,, | Performed by: ADVANCED PRACTICE MIDWIFE

## 2017-07-10 NOTE — PROGRESS NOTES
Quad next visit  Feeling better nausea resolved  Coffective counseling sheet Protect Breastfeeding discussed with mother. Reinforced avoidence of artifical nipples and formula, unless medically indicated.  Encouraged mother to download Coffective mobile anthony if she has not already done so. Mother verbalizes understanding.

## 2017-08-01 ENCOUNTER — PATIENT MESSAGE (OUTPATIENT)
Dept: OBSTETRICS AND GYNECOLOGY | Facility: CLINIC | Age: 32
End: 2017-08-01

## 2017-08-01 ENCOUNTER — TELEPHONE (OUTPATIENT)
Dept: OBSTETRICS AND GYNECOLOGY | Facility: CLINIC | Age: 32
End: 2017-08-01

## 2017-08-01 DIAGNOSIS — Z34.92 NORMAL PREGNANCY IN SECOND TRIMESTER: Primary | ICD-10-CM

## 2017-08-01 NOTE — TELEPHONE ENCOUNTER
Spoke with pt regards to scheduling ultrasound appointment. Appointment was made and pt understood.

## 2017-08-01 NOTE — TELEPHONE ENCOUNTER
----- Message from Ale Blake sent at 8/1/2017 12:48 PM CDT -----  Contact: pt   Pt would like to get an ultrasound on the 28th after her appt, please call pt back at [phone]

## 2017-08-28 ENCOUNTER — PROCEDURE VISIT (OUTPATIENT)
Dept: OBSTETRICS AND GYNECOLOGY | Facility: CLINIC | Age: 32
End: 2017-08-28
Payer: COMMERCIAL

## 2017-08-28 ENCOUNTER — ROUTINE PRENATAL (OUTPATIENT)
Dept: OBSTETRICS AND GYNECOLOGY | Facility: CLINIC | Age: 32
End: 2017-08-28
Payer: COMMERCIAL

## 2017-08-28 VITALS
DIASTOLIC BLOOD PRESSURE: 70 MMHG | SYSTOLIC BLOOD PRESSURE: 106 MMHG | BODY MASS INDEX: 40.54 KG/M2 | WEIGHT: 228.81 LBS

## 2017-08-28 DIAGNOSIS — Z34.92 NORMAL PREGNANCY IN SECOND TRIMESTER: ICD-10-CM

## 2017-08-28 DIAGNOSIS — O99.212 OBESITY COMPLICATING PREGNANCY, SECOND TRIMESTER: ICD-10-CM

## 2017-08-28 DIAGNOSIS — Z34.92 NORMAL PREGNANCY IN SECOND TRIMESTER: Primary | ICD-10-CM

## 2017-08-28 PROBLEM — Z32.01 POSITIVE PREGNANCY TEST: Status: RESOLVED | Noted: 2017-04-28 | Resolved: 2017-08-28

## 2017-08-28 PROCEDURE — 76805 OB US >/= 14 WKS SNGL FETUS: CPT | Mod: S$GLB,,, | Performed by: OBSTETRICS & GYNECOLOGY

## 2017-08-28 PROCEDURE — 99999 PR PBB SHADOW E&M-EST. PATIENT-LVL II: CPT | Mod: PBBFAC,,, | Performed by: ADVANCED PRACTICE MIDWIFE

## 2017-08-28 PROCEDURE — 0502F SUBSEQUENT PRENATAL CARE: CPT | Mod: S$GLB,,, | Performed by: ADVANCED PRACTICE MIDWIFE

## 2017-08-28 NOTE — PROGRESS NOTES
Anatomy scan done male needs rescan next visit  Coffective counseling sheet Build Your Team discussed with mother. Reinforced importance of early identification of support team including champion, OB provider, pediatrician and local community resources. Encouraged mother to download Coffective mobile anthony if she has not already done so.  Mother verbalizes understanding.  Coffective counseling sheet Get Ready discussed with mother. Reinforced avoiding induction of labor unless medically indicated as well as comfort measures during labor.  Encouraged mother to download Coffective mobile anthony if she has not already done so. Mother verbalizes understanding.

## 2017-09-18 ENCOUNTER — PATIENT MESSAGE (OUTPATIENT)
Dept: OBSTETRICS AND GYNECOLOGY | Facility: CLINIC | Age: 32
End: 2017-09-18

## 2017-09-18 RX ORDER — CYCLOBENZAPRINE HCL 10 MG
10 TABLET ORAL 3 TIMES DAILY PRN
Qty: 30 TABLET | Refills: 1 | Status: SHIPPED | OUTPATIENT
Start: 2017-09-18 | End: 2017-09-28

## 2017-09-18 RX ORDER — CYCLOBENZAPRINE HCL 10 MG
10 TABLET ORAL 3 TIMES DAILY PRN
Qty: 30 TABLET | Refills: 1 | Status: SHIPPED | OUTPATIENT
Start: 2017-09-18 | End: 2017-09-18 | Stop reason: SDUPTHER

## 2017-09-25 ENCOUNTER — PROCEDURE VISIT (OUTPATIENT)
Dept: OBSTETRICS AND GYNECOLOGY | Facility: CLINIC | Age: 32
End: 2017-09-25
Payer: COMMERCIAL

## 2017-09-25 ENCOUNTER — ROUTINE PRENATAL (OUTPATIENT)
Dept: OBSTETRICS AND GYNECOLOGY | Facility: CLINIC | Age: 32
End: 2017-09-25
Payer: COMMERCIAL

## 2017-09-25 VITALS
BODY MASS INDEX: 40.61 KG/M2 | WEIGHT: 229.25 LBS | DIASTOLIC BLOOD PRESSURE: 62 MMHG | SYSTOLIC BLOOD PRESSURE: 122 MMHG

## 2017-09-25 DIAGNOSIS — O99.212 OBESITY COMPLICATING PREGNANCY, SECOND TRIMESTER: Primary | ICD-10-CM

## 2017-09-25 DIAGNOSIS — Z98.891 PREVIOUS CESAREAN SECTION: ICD-10-CM

## 2017-09-25 DIAGNOSIS — Z34.80 SUPERVISION OF OTHER NORMAL PREGNANCY: ICD-10-CM

## 2017-09-25 DIAGNOSIS — Z34.92 NORMAL PREGNANCY IN SECOND TRIMESTER: ICD-10-CM

## 2017-09-25 PROCEDURE — 0502F SUBSEQUENT PRENATAL CARE: CPT | Mod: S$GLB,,, | Performed by: ADVANCED PRACTICE MIDWIFE

## 2017-09-25 PROCEDURE — 76816 OB US FOLLOW-UP PER FETUS: CPT | Mod: S$GLB,,, | Performed by: OBSTETRICS & GYNECOLOGY

## 2017-09-25 PROCEDURE — 99999 PR PBB SHADOW E&M-EST. PATIENT-LVL II: CPT | Mod: PBBFAC,,, | Performed by: ADVANCED PRACTICE MIDWIFE

## 2017-09-25 NOTE — PROGRESS NOTES
Anatomy complete, male   28 week labs next visit  Coffective counseling sheet Fall In Love discussed with mother. Reinforced immediate skin to skin, the magic first hour, importance of the first feeding and delaying routine procedures. Encouraged mother to download Coffective mobile anthony if she has not already done so. Mother verbalizes understanding.

## 2017-09-29 ENCOUNTER — PATIENT MESSAGE (OUTPATIENT)
Dept: OBSTETRICS AND GYNECOLOGY | Facility: CLINIC | Age: 32
End: 2017-09-29

## 2017-10-18 ENCOUNTER — LAB VISIT (OUTPATIENT)
Dept: LAB | Facility: HOSPITAL | Age: 32
End: 2017-10-18
Attending: ADVANCED PRACTICE MIDWIFE
Payer: COMMERCIAL

## 2017-10-18 ENCOUNTER — PATIENT MESSAGE (OUTPATIENT)
Dept: OBSTETRICS AND GYNECOLOGY | Facility: CLINIC | Age: 32
End: 2017-10-18

## 2017-10-18 ENCOUNTER — ROUTINE PRENATAL (OUTPATIENT)
Dept: OBSTETRICS AND GYNECOLOGY | Facility: CLINIC | Age: 32
End: 2017-10-18
Payer: COMMERCIAL

## 2017-10-18 VITALS
DIASTOLIC BLOOD PRESSURE: 62 MMHG | SYSTOLIC BLOOD PRESSURE: 118 MMHG | WEIGHT: 228.38 LBS | BODY MASS INDEX: 40.46 KG/M2

## 2017-10-18 DIAGNOSIS — Z98.891 PREVIOUS CESAREAN SECTION: ICD-10-CM

## 2017-10-18 DIAGNOSIS — Z23 NEED FOR DIPHTHERIA-TETANUS-PERTUSSIS (TDAP) VACCINE: ICD-10-CM

## 2017-10-18 DIAGNOSIS — Z34.80 ENCOUNTER FOR SUPERVISION OF NORMAL PREGNANCY IN MULTIGRAVIDA: Primary | ICD-10-CM

## 2017-10-18 DIAGNOSIS — O99.213 OBESITY AFFECTING PREGNANCY IN THIRD TRIMESTER: ICD-10-CM

## 2017-10-18 DIAGNOSIS — Z34.80 ENCOUNTER FOR SUPERVISION OF OTHER NORMAL PREGNANCY: ICD-10-CM

## 2017-10-18 LAB
BASOPHILS # BLD AUTO: 0.01 K/UL
BASOPHILS NFR BLD: 0.2 %
DIFFERENTIAL METHOD: ABNORMAL
EOSINOPHIL # BLD AUTO: 0 K/UL
EOSINOPHIL NFR BLD: 0.5 %
ERYTHROCYTE [DISTWIDTH] IN BLOOD BY AUTOMATED COUNT: 14.1 %
GLUCOSE SERPL-MCNC: 144 MG/DL
HCT VFR BLD AUTO: 31.5 %
HGB BLD-MCNC: 10.4 G/DL
IMM GRANULOCYTES # BLD AUTO: 0.04 K/UL
IMM GRANULOCYTES NFR BLD AUTO: 0.7 %
LYMPHOCYTES # BLD AUTO: 1.2 K/UL
LYMPHOCYTES NFR BLD: 21.9 %
MCH RBC QN AUTO: 28.5 PG
MCHC RBC AUTO-ENTMCNC: 33 G/DL
MCV RBC AUTO: 86 FL
MONOCYTES # BLD AUTO: 0.6 K/UL
MONOCYTES NFR BLD: 11.6 %
NEUTROPHILS # BLD AUTO: 3.6 K/UL
NEUTROPHILS NFR BLD: 65.1 %
NRBC BLD-RTO: 0 /100 WBC
PLATELET # BLD AUTO: 187 K/UL
PMV BLD AUTO: 13 FL
RBC # BLD AUTO: 3.65 M/UL
WBC # BLD AUTO: 5.52 K/UL

## 2017-10-18 PROCEDURE — 90715 TDAP VACCINE 7 YRS/> IM: CPT | Mod: S$GLB,,, | Performed by: ADVANCED PRACTICE MIDWIFE

## 2017-10-18 PROCEDURE — 36415 COLL VENOUS BLD VENIPUNCTURE: CPT | Mod: PO

## 2017-10-18 PROCEDURE — 90471 IMMUNIZATION ADMIN: CPT | Mod: S$GLB,,, | Performed by: ADVANCED PRACTICE MIDWIFE

## 2017-10-18 PROCEDURE — 85025 COMPLETE CBC W/AUTO DIFF WBC: CPT

## 2017-10-18 PROCEDURE — 86592 SYPHILIS TEST NON-TREP QUAL: CPT

## 2017-10-18 PROCEDURE — 0502F SUBSEQUENT PRENATAL CARE: CPT | Mod: S$GLB,,, | Performed by: ADVANCED PRACTICE MIDWIFE

## 2017-10-18 PROCEDURE — 99999 PR PBB SHADOW E&M-EST. PATIENT-LVL II: CPT | Mod: PBBFAC,,, | Performed by: ADVANCED PRACTICE MIDWIFE

## 2017-10-18 PROCEDURE — 86703 HIV-1/HIV-2 1 RESULT ANTBDY: CPT

## 2017-10-18 PROCEDURE — 82950 GLUCOSE TEST: CPT

## 2017-10-18 NOTE — PROGRESS NOTES
Doing well, denies problems. Good fetal movement.   28 week labs today. Plans formula feeding, but may consider breastfeeding. Plans repeat C/S with BTL with Dr. COLEMAN Pang. Desires circumcision for baby boy.  TDAP today. Start US for obesity at next visit.     Coffective counseling sheet Learn Your Baby discussed with mother. Instructed regarding feeding cues and methods to calm baby. Encouraged mother to download Coffective mobile anthony if she has not already done so.  Mother verbalized understanding.

## 2017-10-19 LAB
HIV 1+2 AB+HIV1 P24 AG SERPL QL IA: NEGATIVE
RPR SER QL: NORMAL

## 2017-10-23 ENCOUNTER — TELEPHONE (OUTPATIENT)
Dept: OBSTETRICS AND GYNECOLOGY | Facility: CLINIC | Age: 32
End: 2017-10-23

## 2017-10-23 DIAGNOSIS — O99.810 ABNORMAL GLUCOSE AFFECTING PREGNANCY: Primary | ICD-10-CM

## 2017-10-23 NOTE — TELEPHONE ENCOUNTER
----- Message from Emi Lopes CNM sent at 10/23/2017  8:53 AM CDT -----  She needs a 3 hour GTT, I placed the order.     Emi

## 2017-10-26 ENCOUNTER — PATIENT MESSAGE (OUTPATIENT)
Dept: OBSTETRICS AND GYNECOLOGY | Facility: CLINIC | Age: 32
End: 2017-10-26

## 2017-11-01 ENCOUNTER — LAB VISIT (OUTPATIENT)
Dept: LAB | Facility: HOSPITAL | Age: 32
End: 2017-11-01
Attending: NURSE PRACTITIONER
Payer: COMMERCIAL

## 2017-11-01 DIAGNOSIS — O99.810 ABNORMAL GLUCOSE AFFECTING PREGNANCY: ICD-10-CM

## 2017-11-01 LAB
GLUCOSE SERPL-MCNC: 142 MG/DL
GLUCOSE SERPL-MCNC: 160 MG/DL
GLUCOSE SERPL-MCNC: 79 MG/DL
GLUCOSE SERPL-MCNC: 98 MG/DL

## 2017-11-01 PROCEDURE — 36415 COLL VENOUS BLD VENIPUNCTURE: CPT | Mod: PO

## 2017-11-01 PROCEDURE — 82951 GLUCOSE TOLERANCE TEST (GTT): CPT

## 2017-11-03 ENCOUNTER — PATIENT MESSAGE (OUTPATIENT)
Dept: OBSTETRICS AND GYNECOLOGY | Facility: HOSPITAL | Age: 32
End: 2017-11-03

## 2017-11-07 ENCOUNTER — PROCEDURE VISIT (OUTPATIENT)
Dept: OBSTETRICS AND GYNECOLOGY | Facility: CLINIC | Age: 32
End: 2017-11-07
Payer: COMMERCIAL

## 2017-11-07 ENCOUNTER — ROUTINE PRENATAL (OUTPATIENT)
Dept: OBSTETRICS AND GYNECOLOGY | Facility: CLINIC | Age: 32
End: 2017-11-07
Payer: COMMERCIAL

## 2017-11-07 VITALS
BODY MASS INDEX: 40.69 KG/M2 | DIASTOLIC BLOOD PRESSURE: 66 MMHG | SYSTOLIC BLOOD PRESSURE: 102 MMHG | WEIGHT: 229.75 LBS

## 2017-11-07 DIAGNOSIS — O99.213 OBESITY AFFECTING PREGNANCY IN THIRD TRIMESTER: ICD-10-CM

## 2017-11-07 DIAGNOSIS — O34.219 HISTORY OF CESAREAN DELIVERY, CURRENTLY PREGNANT: Primary | ICD-10-CM

## 2017-11-07 PROCEDURE — 99999 PR PBB SHADOW E&M-EST. PATIENT-LVL II: CPT | Mod: PBBFAC,,, | Performed by: OBSTETRICS & GYNECOLOGY

## 2017-11-07 PROCEDURE — 76815 OB US LIMITED FETUS(S): CPT | Mod: 59,S$GLB,, | Performed by: OBSTETRICS & GYNECOLOGY

## 2017-11-07 PROCEDURE — 76819 FETAL BIOPHYS PROFIL W/O NST: CPT | Mod: 59,S$GLB,, | Performed by: OBSTETRICS & GYNECOLOGY

## 2017-11-07 PROCEDURE — 0502F SUBSEQUENT PRENATAL CARE: CPT | Mod: S$GLB,,, | Performed by: OBSTETRICS & GYNECOLOGY

## 2017-11-07 NOTE — Clinical Note
Please schedule repeat  with right salpingectomy with me 17 at 7 AM.  I need to be booked out until 10:30 so I have time to round and pump.  Thanks!

## 2017-11-10 ENCOUNTER — TELEPHONE (OUTPATIENT)
Dept: OBSTETRICS AND GYNECOLOGY | Facility: CLINIC | Age: 32
End: 2017-11-10

## 2017-11-10 NOTE — TELEPHONE ENCOUNTER
DIEGO completed, scanned to chart, faxed to Susan Vicente with [facility] @ [phone].  Spoke with Susan and she will contact patient to inform her.

## 2017-11-13 ENCOUNTER — PATIENT MESSAGE (OUTPATIENT)
Dept: OBSTETRICS AND GYNECOLOGY | Facility: CLINIC | Age: 32
End: 2017-11-13

## 2017-11-24 ENCOUNTER — ROUTINE PRENATAL (OUTPATIENT)
Dept: OBSTETRICS AND GYNECOLOGY | Facility: CLINIC | Age: 32
End: 2017-11-24
Payer: COMMERCIAL

## 2017-11-24 VITALS
SYSTOLIC BLOOD PRESSURE: 118 MMHG | DIASTOLIC BLOOD PRESSURE: 76 MMHG | BODY MASS INDEX: 40.61 KG/M2 | WEIGHT: 229.25 LBS

## 2017-11-24 DIAGNOSIS — O09.10 PREGNANCY WITH HISTORY OF ECTOPIC PREGNANCY, ANTEPARTUM: Primary | ICD-10-CM

## 2017-11-24 DIAGNOSIS — O34.219 HISTORY OF CESAREAN DELIVERY, CURRENTLY PREGNANT: ICD-10-CM

## 2017-11-24 DIAGNOSIS — O99.213 OBESITY AFFECTING PREGNANCY IN THIRD TRIMESTER: ICD-10-CM

## 2017-11-24 PROCEDURE — 99999 PR PBB SHADOW E&M-EST. PATIENT-LVL II: CPT | Mod: PBBFAC,,, | Performed by: OBSTETRICS & GYNECOLOGY

## 2017-11-24 PROCEDURE — 0502F SUBSEQUENT PRENATAL CARE: CPT | Mod: S$GLB,,, | Performed by: OBSTETRICS & GYNECOLOGY

## 2017-11-24 NOTE — PROGRESS NOTES
Doing well.  Had some low back discomfort yesterday.  No ctxns.  Reviewed comfort measures with the patient.    [place] paperwork needed corrections.  Mrs. Marrufo is taking care of that today.  RTC 12/5/17 as scheduled for routine OB visit and growth ultrasound.  GBBS at that visit.  L&D precautions discussed.

## 2017-11-30 ENCOUNTER — PATIENT MESSAGE (OUTPATIENT)
Dept: OBSTETRICS AND GYNECOLOGY | Facility: CLINIC | Age: 32
End: 2017-11-30

## 2017-12-05 ENCOUNTER — LAB VISIT (OUTPATIENT)
Dept: LAB | Facility: HOSPITAL | Age: 32
End: 2017-12-05
Attending: OBSTETRICS & GYNECOLOGY
Payer: COMMERCIAL

## 2017-12-05 ENCOUNTER — ROUTINE PRENATAL (OUTPATIENT)
Dept: OBSTETRICS AND GYNECOLOGY | Facility: CLINIC | Age: 32
End: 2017-12-05
Payer: COMMERCIAL

## 2017-12-05 ENCOUNTER — PROCEDURE VISIT (OUTPATIENT)
Dept: OBSTETRICS AND GYNECOLOGY | Facility: CLINIC | Age: 32
End: 2017-12-05
Payer: COMMERCIAL

## 2017-12-05 VITALS
WEIGHT: 230.63 LBS | SYSTOLIC BLOOD PRESSURE: 112 MMHG | BODY MASS INDEX: 40.85 KG/M2 | DIASTOLIC BLOOD PRESSURE: 68 MMHG

## 2017-12-05 DIAGNOSIS — B00.9 HSV INFECTION: ICD-10-CM

## 2017-12-05 DIAGNOSIS — O99.213 OBESITY AFFECTING PREGNANCY IN THIRD TRIMESTER: ICD-10-CM

## 2017-12-05 DIAGNOSIS — L29.9 PRURITUS OF PREGNANCY IN THIRD TRIMESTER: ICD-10-CM

## 2017-12-05 DIAGNOSIS — O34.219 HISTORY OF CESAREAN DELIVERY, CURRENTLY PREGNANT: ICD-10-CM

## 2017-12-05 DIAGNOSIS — O99.713 PRURITUS OF PREGNANCY IN THIRD TRIMESTER: ICD-10-CM

## 2017-12-05 DIAGNOSIS — O09.10 PREGNANCY WITH HISTORY OF ECTOPIC PREGNANCY, ANTEPARTUM: Primary | ICD-10-CM

## 2017-12-05 LAB
ALBUMIN SERPL BCP-MCNC: 2.5 G/DL
ALP SERPL-CCNC: 98 U/L
ALT SERPL W/O P-5'-P-CCNC: 11 U/L
ANION GAP SERPL CALC-SCNC: 8 MMOL/L
AST SERPL-CCNC: 12 U/L
BILIRUB SERPL-MCNC: 0.3 MG/DL
BUN SERPL-MCNC: 9 MG/DL
CALCIUM SERPL-MCNC: 8.8 MG/DL
CHLORIDE SERPL-SCNC: 104 MMOL/L
CO2 SERPL-SCNC: 23 MMOL/L
CREAT SERPL-MCNC: 0.8 MG/DL
EST. GFR  (AFRICAN AMERICAN): >60 ML/MIN/1.73 M^2
EST. GFR  (NON AFRICAN AMERICAN): >60 ML/MIN/1.73 M^2
GLUCOSE SERPL-MCNC: 152 MG/DL
POTASSIUM SERPL-SCNC: 3.7 MMOL/L
PROT SERPL-MCNC: 6.6 G/DL
SODIUM SERPL-SCNC: 135 MMOL/L

## 2017-12-05 PROCEDURE — 36415 COLL VENOUS BLD VENIPUNCTURE: CPT

## 2017-12-05 PROCEDURE — 80053 COMPREHEN METABOLIC PANEL: CPT

## 2017-12-05 PROCEDURE — 87081 CULTURE SCREEN ONLY: CPT

## 2017-12-05 PROCEDURE — 0502F SUBSEQUENT PRENATAL CARE: CPT | Mod: S$GLB,,, | Performed by: OBSTETRICS & GYNECOLOGY

## 2017-12-05 PROCEDURE — 76819 FETAL BIOPHYS PROFIL W/O NST: CPT | Mod: 59,S$GLB,, | Performed by: OBSTETRICS & GYNECOLOGY

## 2017-12-05 PROCEDURE — 76815 OB US LIMITED FETUS(S): CPT | Mod: 59,S$GLB,, | Performed by: OBSTETRICS & GYNECOLOGY

## 2017-12-05 PROCEDURE — 99999 PR PBB SHADOW E&M-EST. PATIENT-LVL II: CPT | Mod: PBBFAC,,, | Performed by: OBSTETRICS & GYNECOLOGY

## 2017-12-05 PROCEDURE — 82239 BILE ACIDS TOTAL: CPT

## 2017-12-05 RX ORDER — VALACYCLOVIR HYDROCHLORIDE 500 MG/1
500 TABLET, FILM COATED ORAL DAILY
Qty: 30 TABLET | Refills: 1 | Status: ON HOLD | OUTPATIENT
Start: 2017-12-05 | End: 2017-12-29 | Stop reason: HOSPADM

## 2017-12-05 NOTE — PROGRESS NOTES
Complains of itching on her palms and soles of feet every night.  Will check bile acids and liver enzymes.  No rash noted on exam.  Comfort measures discussed.  Pt also had some low back and low abdominal discomfort last night.  Resolved with tylenol.  No pain today.  GBBS done today.  Rx for Valtrex for HSV prophylaxis.  Sono today: growth appropriate at 42nd%, BPP 8/8  L&D precautions discussed.  RTC 1 week.

## 2017-12-06 LAB — BILE AC SERPL-SCNC: 1 MCMOL/L

## 2017-12-07 LAB — BACTERIA SPEC AEROBE CULT: NORMAL

## 2017-12-11 ENCOUNTER — PATIENT MESSAGE (OUTPATIENT)
Dept: OBSTETRICS AND GYNECOLOGY | Facility: CLINIC | Age: 32
End: 2017-12-11

## 2017-12-11 NOTE — TELEPHONE ENCOUNTER
Please see her [employer] message.  I'm fine with her stopping work whenever she's ready.  See what date she wants as her last date.

## 2017-12-12 ENCOUNTER — HOSPITAL ENCOUNTER (OUTPATIENT)
Facility: HOSPITAL | Age: 32
LOS: 1 days | Discharge: HOME | End: 2017-12-12
Attending: OBSTETRICS & GYNECOLOGY | Admitting: OBSTETRICS & GYNECOLOGY
Payer: COMMERCIAL

## 2017-12-12 ENCOUNTER — TELEPHONE (OUTPATIENT)
Dept: OBSTETRICS AND GYNECOLOGY | Facility: CLINIC | Age: 32
End: 2017-12-12

## 2017-12-12 VITALS
RESPIRATION RATE: 20 BRPM | TEMPERATURE: 98 F | HEIGHT: 63 IN | DIASTOLIC BLOOD PRESSURE: 58 MMHG | WEIGHT: 229 LBS | HEART RATE: 93 BPM | SYSTOLIC BLOOD PRESSURE: 116 MMHG | BODY MASS INDEX: 40.57 KG/M2

## 2017-12-12 DIAGNOSIS — R53.81 MALAISE AND FATIGUE: ICD-10-CM

## 2017-12-12 DIAGNOSIS — R53.83 MALAISE AND FATIGUE: ICD-10-CM

## 2017-12-12 PROCEDURE — 99212 OFFICE O/P EST SF 10 MIN: CPT | Mod: 25,TH,S$PBB, | Performed by: MIDWIFE

## 2017-12-12 PROCEDURE — 59025 FETAL NON-STRESS TEST: CPT

## 2017-12-12 PROCEDURE — 59025 FETAL NON-STRESS TEST: CPT | Mod: 26,,, | Performed by: MIDWIFE

## 2017-12-12 PROCEDURE — 25000003 PHARM REV CODE 250: Performed by: MIDWIFE

## 2017-12-12 PROCEDURE — 99211 OFF/OP EST MAY X REQ PHY/QHP: CPT | Mod: 25

## 2017-12-12 RX ORDER — ONDANSETRON 8 MG/1
8 TABLET, ORALLY DISINTEGRATING ORAL ONCE
Status: COMPLETED | OUTPATIENT
Start: 2017-12-12 | End: 2017-12-12

## 2017-12-12 RX ORDER — CYCLOBENZAPRINE HCL 10 MG
10 TABLET ORAL ONCE
Status: DISCONTINUED | OUTPATIENT
Start: 2017-12-12 | End: 2017-12-12

## 2017-12-12 RX ADMIN — ONDANSETRON 8 MG: 8 TABLET, ORALLY DISINTEGRATING ORAL at 07:12

## 2017-12-12 NOTE — TELEPHONE ENCOUNTER
[facility] paperwork updated with start date of leave beginning 12/22/17.  Approved by Dr. Lorna Pang

## 2017-12-13 ENCOUNTER — ROUTINE PRENATAL (OUTPATIENT)
Dept: OBSTETRICS AND GYNECOLOGY | Facility: CLINIC | Age: 32
End: 2017-12-13
Payer: COMMERCIAL

## 2017-12-13 VITALS — DIASTOLIC BLOOD PRESSURE: 60 MMHG | WEIGHT: 233.69 LBS | SYSTOLIC BLOOD PRESSURE: 108 MMHG | BODY MASS INDEX: 41.4 KG/M2

## 2017-12-13 DIAGNOSIS — O34.219 HISTORY OF CESAREAN DELIVERY, CURRENTLY PREGNANT: Primary | ICD-10-CM

## 2017-12-13 PROCEDURE — 0502F SUBSEQUENT PRENATAL CARE: CPT | Mod: S$GLB,,, | Performed by: ADVANCED PRACTICE MIDWIFE

## 2017-12-13 PROCEDURE — 99999 PR PBB SHADOW E&M-EST. PATIENT-LVL II: CPT | Mod: PBBFAC,,, | Performed by: ADVANCED PRACTICE MIDWIFE

## 2017-12-13 NOTE — DISCHARGE SUMMARY
[facility]  Obstetrics  Discharge Summary      Patient Name: Ralph Hughes  MRN: [id number]  Admission Date: 2017  Hospital Length of Stay: 1 days  Discharge Date and Time:  2017 7:28 PM  Attending Physician: Clay Keene MD   Discharging Provider: Yogesh Cee CNM  Primary Care Provider: LIV Larson Jr, MD    HPI: Pt presented to unit w/ c/o low back pain starting this morning and nausea    Procedure(s) (LRB):  DELIVERY- SECTION WITH TUBAL (N/A)     Hospital Course:   Pt monitored and observed, cervix closed, zofran, d/c home        Final Active Diagnoses:    Diagnosis Date Noted POA    PRINCIPAL PROBLEM:  Malaise and fatigue [R53.81, R53.83] 2017 Yes      Problems Resolved During this Admission:    Diagnosis Date Noted Date Resolved POA        Labs: All labs within the past 24 hours have been reviewed        Immunizations     None          This patient has no babies on file.  Pending Diagnostic Studies:     None          Discharged Condition: good    Disposition: Home or Self Care    Follow Up:  Follow-up Information     Please follow up.    Why:  as scheduled               Patient Instructions:   No discharge procedures on file.  Medications:  Current Discharge Medication List      CONTINUE these medications which have NOT CHANGED    Details   cholecalciferol, vitamin D3, 50,000 unit capsule Take 1 capsule (50,000 Units total) by mouth once a week.  Qty: 4 capsule, Refills: 6      valACYclovir (VALTREX) 500 MG tablet Take 1 tablet (500 mg total) by mouth once daily.  Qty: 30 tablet, Refills: 1    Associated Diagnoses: HSV infection             Yogesh Cee CNM  Obstetrics  [facility]

## 2017-12-13 NOTE — PROGRESS NOTES
"Doing well  Went to hospital last night, "didn't feel good" reported N/V/D, resolved now and feels much better  Stress hydration   Labor precautions and FKCs reviewed, when to go to hospital  GBS negative, pt aware   C/s x 2, plans repeat with RTL    "

## 2017-12-13 NOTE — PROCEDURES
Ralph Hughes is a 32 y.o. female patient.            Obtain Fetal nonstress test (NST)  Date/Time: 12/12/2017 7:29 PM  Performed by: YOGEHS CEE.  Authorized by: YOGESH CEE.     Nonstress Test:     Variability:  6-25 BPM    Decelerations:  None    Accelerations:  15 bpm    Contractions:  Irregular  Biophysical Profile:     Nonstress Test Interpretation: reactive      Overall Impression:  Reassuring  Post-procedure:     Patient tolerance:  Patient tolerated the procedure well with no immediate complications        Yogesh Cee  12/12/2017

## 2017-12-13 NOTE — H&P
[facility]   Obstetrics  History & Physical    Patient Name: Ralph Hughes  MRN: [id number]  Admission Date: 2017  Primary Care Provider: LIV Larson Jr, MD    Subjective:     Principal Problem:Malaise and fatigue    History of Present Illness:  Pt presented to unit w/ c/o low back pain starting this morning and nausea    Obstetric HPI:  Patient reports low back pain and nausea, active fetal movement, No vaginal bleeding , No loss of fluid     This pregnancy has been complicated by hx of  section, hx of ectopic    Obstetric History       T1      L2     SAB0   TAB0   Ectopic1   Multiple0   Live Births2       # Outcome Date GA Lbr Vipin/2nd Weight Sex Delivery Anes PTL Lv   4 Current            3  06/15/11 34w0d  2.155 kg (4 lb 12 oz) F CS-LTranv EPI Y WENDI   2 Term 09 40w0d  3.459 kg (7 lb 10 oz) F CS-LTranv EPI N WENDI      Complications: Fetal Intolerance   1 Ectopic                 Past Medical History:   Diagnosis Date    Herpes simplex virus (HSV) infection      Past Surgical History:   Procedure Laterality Date     SECTION  03/05/2009    X 2    Ectopic Pregnacy Removal  10/13/2016       PTA Medications   Medication Sig    cholecalciferol, vitamin D3, 50,000 unit capsule Take 1 capsule (50,000 Units total) by mouth once a week.    valACYclovir (VALTREX) 500 MG tablet Take 1 tablet (500 mg total) by mouth once daily.       Review of patient's allergies indicates:  No Known Allergies     Family History     Problem Relation (Age of Onset)    Cancer Mother    Hypertension Mother        Social History Main Topics    Smoking status: Never Smoker    Smokeless tobacco: Never Used    Alcohol use No    Drug use: No    Sexual activity: Yes     Partners: Male     Birth control/ protection: None     Review of Systems   Objective:     Vital Signs (Most Recent):    Vital Signs (24h Range):           There is no height or weight on file to calculate  BMI.    FHT: Cat 1 (reassuring)  TOCO:  irregular    Physical Exam    Cervix:  Dilation:  0  Effacement:  0%  Station: -2  Presentation: Vertex     Significant Labs:  Lab Results   Component Value Date    GROUPTRH O POS 2017    HEPBSAG Negative 2017    STREPBCULT No Group B Streptococcus isolated 2017    AFP 1.09 MoM (41.1 ng/mL) 2008       I have personallly reviewed all pertinent lab results from the last 24 hours.    Assessment/Plan:     32 y.o. female  at 36w6d for:    * Malaise and fatigue    Monitored and observed  Zofran  D/c home            Yogesh Cee CNM  Obstetrics  [facility]

## 2017-12-13 NOTE — SUBJECTIVE & OBJECTIVE
Obstetric HPI:  Patient reports low back pain and nausea, active fetal movement, No vaginal bleeding , No loss of fluid     This pregnancy has been complicated by hx of  section, hx of ectopic    Obstetric History       T1      L2     SAB0   TAB0   Ectopic1   Multiple0   Live Births2       # Outcome Date GA Lbr Vipin/2nd Weight Sex Delivery Anes PTL Lv   4 Current            3  06/15/11 34w0d  2.155 kg (4 lb 12 oz) F CS-LTranv EPI Y WENDI   2 Term 09 40w0d  3.459 kg (7 lb 10 oz) F CS-LTranv EPI N WENDI      Complications: Fetal Intolerance   1 Ectopic                 Past Medical History:   Diagnosis Date    Herpes simplex virus (HSV) infection      Past Surgical History:   Procedure Laterality Date     SECTION  03/05/2009    X 2    Ectopic Pregnacy Removal  10/13/2016       PTA Medications   Medication Sig    cholecalciferol, vitamin D3, 50,000 unit capsule Take 1 capsule (50,000 Units total) by mouth once a week.    valACYclovir (VALTREX) 500 MG tablet Take 1 tablet (500 mg total) by mouth once daily.       Review of patient's allergies indicates:  No Known Allergies     Family History     Problem Relation (Age of Onset)    Cancer Mother    Hypertension Mother        Social History Main Topics    Smoking status: Never Smoker    Smokeless tobacco: Never Used    Alcohol use No    Drug use: No    Sexual activity: Yes     Partners: Male     Birth control/ protection: None     Review of Systems   Objective:     Vital Signs (Most Recent):    Vital Signs (24h Range):           There is no height or weight on file to calculate BMI.    FHT: Cat 1 (reassuring)  TOCO:  irregular    Physical Exam    Cervix:  Dilation:  0  Effacement:  0%  Station: -2  Presentation: Vertex     Significant Labs:  Lab Results   Component Value Date    GROUPTRH O POS 2017    HEPBSAG Negative 2017    STREPBCULT No Group B Streptococcus isolated 2017    AFP 1.09 MoM (41.1 ng/mL)  09/23/2008       I have personallly reviewed all pertinent lab results from the last 24 hours.

## 2017-12-14 RX ORDER — PROMETHAZINE HYDROCHLORIDE 25 MG/1
TABLET ORAL
Qty: 30 TABLET | Refills: 0 | Status: SHIPPED | OUTPATIENT
Start: 2017-12-14 | End: 2018-01-26

## 2017-12-15 ENCOUNTER — TELEPHONE (OUTPATIENT)
Dept: OBSTETRICS AND GYNECOLOGY | Facility: CLINIC | Age: 32
End: 2017-12-15

## 2017-12-15 NOTE — TELEPHONE ENCOUNTER
----- Message from Hodan Scott sent at 12/15/2017  2:58 PM CST -----  Contact: Delmy/ [facility]  States she's calling to verify if the patient has delivered yet and if so, she needs the delivery date and type. Please call Delmy at [phone]. Thank you

## 2017-12-20 ENCOUNTER — HOSPITAL ENCOUNTER (EMERGENCY)
Facility: HOSPITAL | Age: 32
Discharge: HOME | End: 2017-12-20
Attending: EMERGENCY MEDICINE
Payer: COMMERCIAL

## 2017-12-20 ENCOUNTER — PATIENT MESSAGE (OUTPATIENT)
Dept: OBSTETRICS AND GYNECOLOGY | Facility: CLINIC | Age: 32
End: 2017-12-20

## 2017-12-20 VITALS
RESPIRATION RATE: 20 BRPM | HEIGHT: 63 IN | DIASTOLIC BLOOD PRESSURE: 74 MMHG | BODY MASS INDEX: 41.58 KG/M2 | TEMPERATURE: 98 F | HEART RATE: 80 BPM | WEIGHT: 234.69 LBS | SYSTOLIC BLOOD PRESSURE: 140 MMHG | OXYGEN SATURATION: 99 %

## 2017-12-20 DIAGNOSIS — M79.662 PAIN IN LEFT LOWER LEG: Primary | ICD-10-CM

## 2017-12-20 DIAGNOSIS — M79.606 PREGNANCY RELATED LEG PAIN IN THIRD TRIMESTER, ANTEPARTUM: ICD-10-CM

## 2017-12-20 DIAGNOSIS — O26.893 PREGNANCY RELATED LEG PAIN IN THIRD TRIMESTER, ANTEPARTUM: ICD-10-CM

## 2017-12-20 PROCEDURE — 99283 EMERGENCY DEPT VISIT LOW MDM: CPT

## 2017-12-20 NOTE — TELEPHONE ENCOUNTER
Spoke with pt, states that she does not have any swelling in her leg. Notified pt that if she starts to have swelling or redness in her leg to report to the ED to rule out blood clot. Pt verbalized understanding.

## 2017-12-21 ENCOUNTER — ROUTINE PRENATAL (OUTPATIENT)
Dept: OBSTETRICS AND GYNECOLOGY | Facility: CLINIC | Age: 32
End: 2017-12-21
Payer: COMMERCIAL

## 2017-12-21 VITALS — DIASTOLIC BLOOD PRESSURE: 80 MMHG | SYSTOLIC BLOOD PRESSURE: 127 MMHG | BODY MASS INDEX: 41.4 KG/M2 | WEIGHT: 233.69 LBS

## 2017-12-21 DIAGNOSIS — O34.219 HISTORY OF CESAREAN DELIVERY, CURRENTLY PREGNANT: Primary | ICD-10-CM

## 2017-12-21 DIAGNOSIS — B00.9 HSV INFECTION: ICD-10-CM

## 2017-12-21 DIAGNOSIS — O09.10 PREGNANCY WITH HISTORY OF ECTOPIC PREGNANCY, ANTEPARTUM: ICD-10-CM

## 2017-12-21 PROCEDURE — 99999 PR PBB SHADOW E&M-EST. PATIENT-LVL II: CPT | Mod: PBBFAC,,, | Performed by: OBSTETRICS & GYNECOLOGY

## 2017-12-21 PROCEDURE — 0502F SUBSEQUENT PRENATAL CARE: CPT | Mod: S$GLB,,, | Performed by: OBSTETRICS & GYNECOLOGY

## 2017-12-21 RX ORDER — SODIUM CHLORIDE, SODIUM LACTATE, POTASSIUM CHLORIDE, CALCIUM CHLORIDE 600; 310; 30; 20 MG/100ML; MG/100ML; MG/100ML; MG/100ML
INJECTION, SOLUTION INTRAVENOUS CONTINUOUS
Status: CANCELLED | OUTPATIENT
Start: 2017-12-21

## 2017-12-21 RX ORDER — MISOPROSTOL 100 UG/1
800 TABLET ORAL
Status: CANCELLED | OUTPATIENT
Start: 2017-12-21

## 2017-12-21 RX ORDER — SODIUM CITRATE AND CITRIC ACID MONOHYDRATE 334; 500 MG/5ML; MG/5ML
30 SOLUTION ORAL
Status: CANCELLED | OUTPATIENT
Start: 2017-12-21

## 2017-12-21 NOTE — PROGRESS NOTES
Doing well.  Went to the ED yesterday for LLE pain.  Evaluation for DVT was negative.  Pt states she elevated her legs, and her pain is gone.  No swelling or pain today.  Her itching also resolved.  Serum bile acids were negative.  Ready for repeat  next week.  Still desires right salpingectomy.  Has a hx of left salpingectomy for ruptured ectopic pregnancy.  Pre-op instructions given and hibiclens packs given.  Consents reviewed in detail and signed.  Coffective counseling sheet Get Ready discussed with mother. Reinforced avoiding induction of labor unless medically indicated as well as comfort measures during labor.  Encouraged mother to download Coffective mobile anthony if she has not already done so. Mother verbalizes understanding.  Coffective counseling sheet Fall In Love discussed with mother. Reinforced immediate skin to skin, the magic first hour, importance of the first feeding and delaying routine procedures. Encouraged mother to download Coffective mobile anthony if she has not already done so. Mother verbalizes understanding.  Coffective counseling sheet Keep Baby Close discussed with mother. Reinforced rooming in practices, continued skin to skin, and quiet hours as requested by mother.  Encouraged mother to download Coffective mobile anthony if she has not already done so. Mother verbalizes understanding.  Pt may try to breastfeed.  RTC for post-op visit.

## 2017-12-21 NOTE — ED PROVIDER NOTES
SCRIBE #1 NOTE: I, Wes Gauthier, am scribing for, and in the presence of, Blade Mckeon NP. I have scribed the HPI, ROS, and PEx of the note.     SCRIBE #2 NOTE: I, Haylee Davis, am scribing for, and in the presence of,  Blade Mckeon NP. I have scribed the remaining portions of the note not scribed by Scribe #1.     History      Chief Complaint   Patient presents with    Leg Pain     L calf pain onset this morning. +38 weeks preg       Review of patient's allergies indicates:  No Known Allergies     HPI   HPI    2017, 9:37 PM   History obtained from the patient      History of Present Illness: Ralph Hughes is a 32 y.o. female patient who is 38 wks gestation presents to the Emergency Department for LLE pain which onset gradually this AM. Pt reports that she spoke to an [facility] nurse who advised her to seek tx in the ED for concerns of a possible blood clot. Symptoms are constant and moderate in severity.  No mitigating or exacerbating factors reported. No other associated sxs reported. Patient denies any fever, chills, abd pain, n/v/d, SOB, cough, dysuria, hematuria, and all other sxs at this time. No further complaints or concerns at this time.         Arrival mode: Personal vehicle    PCP: LIV Larson Jr, MD       Past Medical History:  Past Medical History:   Diagnosis Date    Herpes simplex virus (HSV) infection        Past Surgical History:  Past Surgical History:   Procedure Laterality Date     SECTION  03/05/2009    X 2    Ectopic Pregnacy Removal  10/13/2016         Family History:  Family History   Problem Relation Age of Onset    Hypertension Mother     Cancer Mother     Breast cancer Neg Hx     Colon cancer Neg Hx     Ovarian cancer Neg Hx     Thrombophilia Neg Hx        Social History:  Social History     Social History Main Topics    Smoking status: Never Smoker    Smokeless tobacco: Never Used    Alcohol use No    Drug use: No    Sexual activity: Yes      Partners: Male     Birth control/ protection: None       ROS   Review of Systems   Constitutional: Negative for chills, diaphoresis and fever.   Respiratory: Negative for shortness of breath.    Cardiovascular: Negative for chest pain.   Gastrointestinal: Negative for abdominal pain, diarrhea, nausea and vomiting.   Genitourinary: Negative for difficulty urinating, dysuria, frequency, hematuria and urgency.   Musculoskeletal: Negative for back pain.        + LLE pain   Skin: Negative for rash.   Neurological: Negative for dizziness, syncope, weakness, light-headedness, numbness and headaches.   All other systems reviewed and are negative.      Physical Exam      Initial Vitals [12/20/17 2117]   BP Pulse Resp Temp SpO2   (!) 141/72 82 18 98.2 °F (36.8 °C) 97 %      MAP       95          Physical Exam  Nursing Notes and Vital Signs Reviewed.  Constitutional: Patient is in no apparent distress. Well-developed and well-nourished.  Head: Atraumatic. Normocephalic.  Eyes: PERRL. EOM intact. Conjunctivae are not pale. No scleral icterus.  ENT: Mucous membranes are moist. Oropharynx is clear and symmetric.    Neck: Supple. Full ROM. No lymphadenopathy.  Cardiovascular: Regular rate. Regular rhythm. No murmurs, rubs, or gallops. Distal pulses are 2+ and symmetric.  Pulmonary/Chest: No respiratory distress. Clear to auscultation bilaterally. No wheezing or rales.  Abdominal: Soft and non-distended.  There is no tenderness.  No rebound, guarding, or rigidity. Good bowel sounds.  Musculoskeletal: Moves all extremities. No obvious deformities. No edema. No calf tenderness.  LLE: 44.5 cm circumference. No evident deformity.Left lateral lower leg tenderness noted. ROM is normal. Cap refill distally is <2 seconds. DP and PT pulses are equal and 2+ bilaterally. No motor deficit. No distal sensory deficit  RUE: 44.5 cm circumference. No evident deformity. ROM is normal. Cap refill distally is <2 seconds. Radial pulses are equal  "and 2+ bilaterally. No motor deficit. No distal sensory deficit.  Skin: Warm and dry.  Neurological:  Alert, awake, and appropriate.  Normal speech.  No acute focal neurological deficits are appreciated.  Psychiatric: Normal affect. Good eye contact. Appropriate in content.    ED Course    Procedures  ED Vital Signs:  Vitals:    12/20/17 2117 12/20/17 2231   BP: (!) 141/72 (!) 140/74   Pulse: 82 80   Resp: 18 20   Temp: 98.2 °F (36.8 °C)    TempSrc: Oral    SpO2: 97% 99%   Weight: 106.4 kg (234 lb 10.9 oz)    Height: 5' 3" (1.6 m)          Imaging Results:  Imaging Results          US Lower Extremity Veins Left (Final result)  Result time 12/20/17 22:16:00    Final result by Edmar Keane MD (12/20/17 22:16:00)                 Impression:       1.  Negative exam.      Electronically signed by: EDMAR KEANE MD  Date:     12/20/17  Time:    22:16              Narrative:    Indication: Leg swelling, leg pain.    CPT code 30139.    Findings:    Grayscale, color flow, and Doppler evaluation of the left lower extremity deep venous structures was performed.    All deep venous structures display normal coaptation and response augmentation without evidence of deep venous thrombosis.                                      The Emergency Provider reviewed the vital signs and test results, which are outlined above.    ED Discussion     10:19 PM: Reassessed pt at this time. Discussed with pt all pertinent ED information and results. Discussed pt dx and plan of tx. Gave pt all f/u and return to the ED instructions. All questions and concerns were addressed at this time. Pt expresses understanding of information and instructions, and is comfortable with plan to discharge. Pt is stable for discharge.        ED Medication(s):  Medications - No data to display    Discharge Medication List as of 12/20/2017 10:19 PM          Follow-up Information     E Edmar Larson Jr, MD. Schedule an appointment as soon as possible for a visit in 2 " days.    Specialties:  Internal Medicine, Pediatrics  Contact information:  [address]  [place] [id number] [phone]             [facility] .    Specialty:  Emergency Medicine  Why:  As needed, If symptoms worsen  Contact information:  [address]  [facility] [address] [phone]                   Medical Decision Making    Medical Decision Making:   Clinical Tests:   Radiological Study: Ordered and Reviewed           Scribe Attestation:   Scribe #1: I performed the above scribed service and the documentation accurately describes the services I performed. I attest to the accuracy of the note.    Attending:   Physician Attestation Statement for Scribe #1: I, Blade Mckeon NP, personally performed the services described in this documentation, as scribed by Wes Gauthier, in my presence, and it is both accurate and complete.       Scribe Attestation:   Scribe #2: I performed the above scribed service and the documentation accurately describes the services I performed. I attest to the accuracy of the note.    Attending Attestation:           Physician Attestation for Scribe:    Physician Attestation Statement for Scribe #2: I, Blade Mckeon NP, reviewed documentation, as scribed by Haylee Davis in my presence, and it is both accurate and complete. I also acknowledge and confirm the content of the note done by Scribe #1.          Clinical Impression       ICD-10-CM ICD-9-CM   1. Pain in left lower leg M79.662 729.5   2. Pregnancy related leg pain in third trimester, antepartum O26.893 646.83    M79.606 729.5       Disposition:   Disposition: Discharged  Condition: Stable         Blade Mckeon NP  12/21/17 0042

## 2017-12-27 ENCOUNTER — TELEPHONE (OUTPATIENT)
Dept: OBSTETRICS AND GYNECOLOGY | Facility: CLINIC | Age: 32
End: 2017-12-27

## 2017-12-27 ENCOUNTER — PATIENT MESSAGE (OUTPATIENT)
Dept: OBSTETRICS AND GYNECOLOGY | Facility: HOSPITAL | Age: 32
End: 2017-12-27

## 2017-12-27 NOTE — PRE ADMISSION SCREENING
Pre op instructions reviewed with patient per phone:    To confirm, you surgeon has scheduled you for a .  Surgery is scheduled 17 .  Your doctor will instruct on the time of  your surgery.    Please report to [facility] [address] 4th floor at time instructed by your doctor.  Arrive 2 hours prior to surgery        IMPORTANT INSTRUCTIONS!!  Do not eat anything 8 hours prior to surgery.    You may have water and clear juice 3 hours prior to surgery.    OK to brush teeth, no gum, candy or mints!    So not shave pubic hair 7 days prior to surgery      SHOWERING INSTRUCTIONS:   The night before and morning prior to coming to the hospital:    In the shower, it is best practice to wash and rinse your hair with shampoo, rinse completely     Wet entire body and wash with anti-bacterial soap, rinse completely    With your hand, apply one packet of Hibiclens soap to abdomen from under breasts to above pubic bone, gently scrubbing for 5 minutes.  Do not scrub your skin too hard  Avoid getting Hibiclens on your head, face, or genitals (private parts)    Once you have completed the wash, rinse the Hibiclens thoroughly.      Do not wash with regular soap or anti-bacterial soap after using the Hibiclens    Pat yourself dry using clean dry towel.  DO NOT apply any lotions or powder to abdomen.    Put on clean clothes    It is also recommended best practice to remove all artificial nails/polish prior to surgery

## 2017-12-27 NOTE — TELEPHONE ENCOUNTER
Sent patient a message via My Chart to inform her that her paperwork was sent with her leave date beginning on December 22, 2017.

## 2017-12-28 ENCOUNTER — ANESTHESIA EVENT (OUTPATIENT)
Dept: OBSTETRICS AND GYNECOLOGY | Facility: HOSPITAL | Age: 32
End: 2017-12-28
Payer: COMMERCIAL

## 2017-12-28 ENCOUNTER — HOSPITAL ENCOUNTER (INPATIENT)
Facility: HOSPITAL | Age: 32
LOS: 1 days | Discharge: HOME | End: 2017-12-29
Attending: OBSTETRICS & GYNECOLOGY | Admitting: OBSTETRICS & GYNECOLOGY
Payer: COMMERCIAL

## 2017-12-28 ENCOUNTER — SURGERY (OUTPATIENT)
Age: 32
End: 2017-12-28

## 2017-12-28 ENCOUNTER — TELEPHONE (OUTPATIENT)
Dept: OBSTETRICS AND GYNECOLOGY | Facility: CLINIC | Age: 32
End: 2017-12-28

## 2017-12-28 ENCOUNTER — ANESTHESIA (OUTPATIENT)
Dept: OBSTETRICS AND GYNECOLOGY | Facility: HOSPITAL | Age: 32
End: 2017-12-28
Payer: COMMERCIAL

## 2017-12-28 DIAGNOSIS — Z98.891 STATUS POST REPEAT LOW TRANSVERSE CESAREAN SECTION: ICD-10-CM

## 2017-12-28 DIAGNOSIS — O34.219 HISTORY OF CESAREAN DELIVERY, CURRENTLY PREGNANT: ICD-10-CM

## 2017-12-28 LAB
ABO + RH BLD: NORMAL
BASOPHILS # BLD AUTO: 0.01 K/UL
BASOPHILS NFR BLD: 0.2 %
BLD GP AB SCN CELLS X3 SERPL QL: NORMAL
DIFFERENTIAL METHOD: ABNORMAL
EOSINOPHIL # BLD AUTO: 0 K/UL
EOSINOPHIL NFR BLD: 0.6 %
ERYTHROCYTE [DISTWIDTH] IN BLOOD BY AUTOMATED COUNT: 14.5 %
HCT VFR BLD AUTO: 32.4 %
HGB BLD-MCNC: 10.6 G/DL
LYMPHOCYTES # BLD AUTO: 1.3 K/UL
LYMPHOCYTES NFR BLD: 24.5 %
MCH RBC QN AUTO: 28.7 PG
MCHC RBC AUTO-ENTMCNC: 32.7 G/DL
MCV RBC AUTO: 88 FL
MONOCYTES # BLD AUTO: 0.7 K/UL
MONOCYTES NFR BLD: 13.1 %
NEUTROPHILS # BLD AUTO: 3.3 K/UL
NEUTROPHILS NFR BLD: 61.6 %
PLATELET # BLD AUTO: 163 K/UL
PMV BLD AUTO: 12.3 FL
RBC # BLD AUTO: 3.69 M/UL
WBC # BLD AUTO: 5.42 K/UL

## 2017-12-28 PROCEDURE — 63600175 PHARM REV CODE 636 W HCPCS: Performed by: OBSTETRICS & GYNECOLOGY

## 2017-12-28 PROCEDURE — 37000008 HC ANESTHESIA 1ST 15 MINUTES: Performed by: OBSTETRICS & GYNECOLOGY

## 2017-12-28 PROCEDURE — 58611 LIGATE OVIDUCT(S) ADD-ON: CPT | Mod: ,,, | Performed by: OBSTETRICS & GYNECOLOGY

## 2017-12-28 PROCEDURE — 88305 TISSUE EXAM BY PATHOLOGIST: CPT | Mod: 26,,, | Performed by: PATHOLOGY

## 2017-12-28 PROCEDURE — 36000680 HC C/S TUBAL LIGATION LEVEL I

## 2017-12-28 PROCEDURE — 85025 COMPLETE CBC W/AUTO DIFF WBC: CPT

## 2017-12-28 PROCEDURE — 25000003 PHARM REV CODE 250: Performed by: OBSTETRICS & GYNECOLOGY

## 2017-12-28 PROCEDURE — 27200688 HC TRAY, SPINAL-HYPER/ ISOBARIC: Performed by: NURSE ANESTHETIST, CERTIFIED REGISTERED

## 2017-12-28 PROCEDURE — 59514 CESAREAN DELIVERY ONLY: CPT | Mod: AS,,, | Performed by: PHYSICIAN ASSISTANT

## 2017-12-28 PROCEDURE — 11000001 HC ACUTE MED/SURG PRIVATE ROOM

## 2017-12-28 PROCEDURE — 37000009 HC ANESTHESIA EA ADD 15 MINS: Performed by: OBSTETRICS & GYNECOLOGY

## 2017-12-28 PROCEDURE — 86901 BLOOD TYPING SEROLOGIC RH(D): CPT

## 2017-12-28 PROCEDURE — 25000003 PHARM REV CODE 250: Performed by: NURSE ANESTHETIST, CERTIFIED REGISTERED

## 2017-12-28 PROCEDURE — 58611 LIGATE OVIDUCT(S) ADD-ON: CPT | Mod: AS,,, | Performed by: PHYSICIAN ASSISTANT

## 2017-12-28 PROCEDURE — 0UB50ZZ EXCISION OF RIGHT FALLOPIAN TUBE, OPEN APPROACH: ICD-10-PCS | Performed by: OBSTETRICS & GYNECOLOGY

## 2017-12-28 PROCEDURE — 59510 CESAREAN DELIVERY: CPT | Mod: ,,, | Performed by: OBSTETRICS & GYNECOLOGY

## 2017-12-28 PROCEDURE — 51702 INSERT TEMP BLADDER CATH: CPT

## 2017-12-28 PROCEDURE — 63600175 PHARM REV CODE 636 W HCPCS: Performed by: NURSE ANESTHETIST, CERTIFIED REGISTERED

## 2017-12-28 PROCEDURE — 88305 TISSUE EXAM BY PATHOLOGIST: CPT | Performed by: PATHOLOGY

## 2017-12-28 RX ORDER — PHENYLEPHRINE HYDROCHLORIDE 10 MG/ML
INJECTION INTRAVENOUS
Status: DISCONTINUED | OUTPATIENT
Start: 2017-12-28 | End: 2017-12-28

## 2017-12-28 RX ORDER — IBUPROFEN 800 MG/1
800 TABLET ORAL EVERY 8 HOURS
Status: DISCONTINUED | OUTPATIENT
Start: 2017-12-29 | End: 2017-12-29 | Stop reason: HOSPADM

## 2017-12-28 RX ORDER — MISOPROSTOL 200 UG/1
800 TABLET ORAL
Status: DISCONTINUED | OUTPATIENT
Start: 2017-12-28 | End: 2017-12-28

## 2017-12-28 RX ORDER — ONDANSETRON 8 MG/1
8 TABLET, ORALLY DISINTEGRATING ORAL EVERY 8 HOURS PRN
Status: DISCONTINUED | OUTPATIENT
Start: 2017-12-28 | End: 2017-12-29 | Stop reason: HOSPADM

## 2017-12-28 RX ORDER — HYDROCODONE BITARTRATE AND ACETAMINOPHEN 10; 325 MG/1; MG/1
1 TABLET ORAL EVERY 4 HOURS PRN
Status: DISCONTINUED | OUTPATIENT
Start: 2017-12-28 | End: 2017-12-29 | Stop reason: HOSPADM

## 2017-12-28 RX ORDER — MORPHINE SULFATE 1 MG/ML
INJECTION, SOLUTION EPIDURAL; INTRATHECAL; INTRAVENOUS
Status: DISCONTINUED | OUTPATIENT
Start: 2017-12-28 | End: 2017-12-28

## 2017-12-28 RX ORDER — DIPHENHYDRAMINE HYDROCHLORIDE 50 MG/ML
25 INJECTION INTRAMUSCULAR; INTRAVENOUS EVERY 4 HOURS PRN
Status: DISCONTINUED | OUTPATIENT
Start: 2017-12-28 | End: 2017-12-29 | Stop reason: HOSPADM

## 2017-12-28 RX ORDER — CEFAZOLIN SODIUM 1 G/3ML
INJECTION, POWDER, FOR SOLUTION INTRAMUSCULAR; INTRAVENOUS
Status: DISCONTINUED | OUTPATIENT
Start: 2017-12-28 | End: 2017-12-28

## 2017-12-28 RX ORDER — ONDANSETRON 2 MG/ML
INJECTION INTRAMUSCULAR; INTRAVENOUS
Status: DISCONTINUED | OUTPATIENT
Start: 2017-12-28 | End: 2017-12-28

## 2017-12-28 RX ORDER — SODIUM CHLORIDE, SODIUM LACTATE, POTASSIUM CHLORIDE, CALCIUM CHLORIDE 600; 310; 30; 20 MG/100ML; MG/100ML; MG/100ML; MG/100ML
INJECTION, SOLUTION INTRAVENOUS CONTINUOUS
Status: DISCONTINUED | OUTPATIENT
Start: 2017-12-28 | End: 2017-12-28

## 2017-12-28 RX ORDER — HYDROCODONE BITARTRATE AND ACETAMINOPHEN 5; 325 MG/1; MG/1
1 TABLET ORAL EVERY 4 HOURS PRN
Status: DISCONTINUED | OUTPATIENT
Start: 2017-12-28 | End: 2017-12-29 | Stop reason: HOSPADM

## 2017-12-28 RX ORDER — DOCUSATE SODIUM 100 MG/1
100 CAPSULE, LIQUID FILLED ORAL DAILY
Status: DISCONTINUED | OUTPATIENT
Start: 2017-12-28 | End: 2017-12-29 | Stop reason: HOSPADM

## 2017-12-28 RX ORDER — HYDROMORPHONE HYDROCHLORIDE 2 MG/1
4 TABLET ORAL EVERY 4 HOURS PRN
Status: DISCONTINUED | OUTPATIENT
Start: 2017-12-28 | End: 2017-12-29 | Stop reason: HOSPADM

## 2017-12-28 RX ORDER — BUPIVACAINE HYDROCHLORIDE 7.5 MG/ML
INJECTION, SOLUTION INTRASPINAL
Status: DISCONTINUED | OUTPATIENT
Start: 2017-12-28 | End: 2017-12-28

## 2017-12-28 RX ORDER — OXYTOCIN/RINGER'S LACTATE 20/1000 ML
PLASTIC BAG, INJECTION (ML) INTRAVENOUS CONTINUOUS PRN
Status: DISCONTINUED | OUTPATIENT
Start: 2017-12-28 | End: 2017-12-28

## 2017-12-28 RX ORDER — OXYTOCIN/RINGER'S LACTATE 20/1000 ML
41.65 PLASTIC BAG, INJECTION (ML) INTRAVENOUS CONTINUOUS
Status: DISPENSED | OUTPATIENT
Start: 2017-12-28 | End: 2017-12-28

## 2017-12-28 RX ORDER — SODIUM CITRATE AND CITRIC ACID MONOHYDRATE 334; 500 MG/5ML; MG/5ML
30 SOLUTION ORAL
Status: DISCONTINUED | OUTPATIENT
Start: 2017-12-28 | End: 2017-12-28

## 2017-12-28 RX ORDER — CEFAZOLIN SODIUM 2 G/50ML
2 SOLUTION INTRAVENOUS
Status: DISCONTINUED | OUTPATIENT
Start: 2017-12-28 | End: 2017-12-28

## 2017-12-28 RX ORDER — SODIUM CHLORIDE, SODIUM LACTATE, POTASSIUM CHLORIDE, CALCIUM CHLORIDE 600; 310; 30; 20 MG/100ML; MG/100ML; MG/100ML; MG/100ML
INJECTION, SOLUTION INTRAVENOUS CONTINUOUS PRN
Status: DISCONTINUED | OUTPATIENT
Start: 2017-12-28 | End: 2017-12-28

## 2017-12-28 RX ORDER — KETOROLAC TROMETHAMINE 30 MG/ML
30 INJECTION, SOLUTION INTRAMUSCULAR; INTRAVENOUS EVERY 6 HOURS
Status: DISCONTINUED | OUTPATIENT
Start: 2017-12-28 | End: 2017-12-29 | Stop reason: HOSPADM

## 2017-12-28 RX ORDER — CEFAZOLIN SODIUM 1 G/50ML
2 SOLUTION INTRAVENOUS
Status: DISCONTINUED | OUTPATIENT
Start: 2017-12-28 | End: 2017-12-28

## 2017-12-28 RX ORDER — AMMONIA 15 % (W/V)
0.3 AMPUL (EA) INHALATION CONTINUOUS PRN
Status: DISCONTINUED | OUTPATIENT
Start: 2017-12-28 | End: 2017-12-29 | Stop reason: HOSPADM

## 2017-12-28 RX ORDER — ACETAMINOPHEN 325 MG/1
650 TABLET ORAL EVERY 6 HOURS PRN
Status: DISCONTINUED | OUTPATIENT
Start: 2017-12-28 | End: 2017-12-29 | Stop reason: HOSPADM

## 2017-12-28 RX ORDER — SODIUM CHLORIDE, SODIUM LACTATE, POTASSIUM CHLORIDE, CALCIUM CHLORIDE 600; 310; 30; 20 MG/100ML; MG/100ML; MG/100ML; MG/100ML
INJECTION, SOLUTION INTRAVENOUS CONTINUOUS
Status: DISCONTINUED | OUTPATIENT
Start: 2017-12-28 | End: 2017-12-29 | Stop reason: HOSPADM

## 2017-12-28 RX ORDER — HYDROMORPHONE HYDROCHLORIDE 1 MG/ML
1 INJECTION, SOLUTION INTRAMUSCULAR; INTRAVENOUS; SUBCUTANEOUS EVERY 4 HOURS PRN
Status: DISCONTINUED | OUTPATIENT
Start: 2017-12-28 | End: 2017-12-28

## 2017-12-28 RX ORDER — DIPHENHYDRAMINE HCL 25 MG
25 CAPSULE ORAL EVERY 4 HOURS PRN
Status: DISCONTINUED | OUTPATIENT
Start: 2017-12-28 | End: 2017-12-29 | Stop reason: HOSPADM

## 2017-12-28 RX ADMIN — PHENYLEPHRINE HYDROCHLORIDE 100 MCG: 10 INJECTION INTRAVENOUS at 10:12

## 2017-12-28 RX ADMIN — CEFAZOLIN 2 G: 1 INJECTION, POWDER, FOR SOLUTION INTRAMUSCULAR; INTRAVENOUS at 10:12

## 2017-12-28 RX ADMIN — SODIUM CHLORIDE, SODIUM LACTATE, POTASSIUM CHLORIDE, AND CALCIUM CHLORIDE: 600; 310; 30; 20 INJECTION, SOLUTION INTRAVENOUS at 09:12

## 2017-12-28 RX ADMIN — ONDANSETRON 4 MG: 2 INJECTION, SOLUTION INTRAMUSCULAR; INTRAVENOUS at 10:12

## 2017-12-28 RX ADMIN — Medication: at 10:12

## 2017-12-28 RX ADMIN — KETOROLAC TROMETHAMINE 30 MG: 30 INJECTION, SOLUTION INTRAMUSCULAR at 06:12

## 2017-12-28 RX ADMIN — MORPHINE SULFATE 200 MCG: 1 INJECTION, SOLUTION EPIDURAL; INTRATHECAL; INTRAVENOUS at 10:12

## 2017-12-28 RX ADMIN — Medication: at 11:12

## 2017-12-28 RX ADMIN — SODIUM CHLORIDE, SODIUM LACTATE, POTASSIUM CHLORIDE, AND CALCIUM CHLORIDE 2000 ML: 600; 310; 30; 20 INJECTION, SOLUTION INTRAVENOUS at 09:12

## 2017-12-28 RX ADMIN — BUPIVACAINE HYDROCHLORIDE IN DEXTROSE 1.6 ML: 7.5 INJECTION, SOLUTION SUBARACHNOID at 10:12

## 2017-12-28 NOTE — TELEPHONE ENCOUNTER
----- Message from Meliza Armstrong sent at 12/28/2017 10:40 AM CST -----  Contact: Elisabeth/[employer]   Elisabeth called to get a delivery date and the way the patient delivered in order to complete this patients claim.    She can be contacted at [phone] (private line - can leave a message).    Thanks,  Meliza

## 2017-12-28 NOTE — ANESTHESIA POSTPROCEDURE EVALUATION
"Anesthesia Post Evaluation    Patient: Ralph Hughes    Procedure(s) Performed: Procedure(s) (LRB):  DELIVERY- SECTION WITH TUBAL (N/A)    Final Anesthesia Type: spinal  Patient location during evaluation: labor & delivery  Patient participation: Yes- Able to Participate  Level of consciousness: awake and alert and oriented  Post-procedure vital signs: reviewed and stable  Pain management: adequate  Airway patency: patent  PONV status at discharge: No PONV  Anesthetic complications: no      Cardiovascular status: hemodynamically stable  Respiratory status: spontaneous ventilation, unassisted and room air  Hydration status: euvolemic  Follow-up not needed.        Visit Vitals  BP (!) 111/48 (BP Location: Left arm, Patient Position: Lying)   Pulse 86   Temp 36.4 °C (97.6 °F) (Oral)   Resp 16   Ht 5' 3" (1.6 m)   Wt 106.1 kg (234 lb)   LMP 2017   SpO2 99%   BMI 41.45 kg/m²       Pain/Brian Score: No Data Recorded      "

## 2017-12-28 NOTE — ANESTHESIA PREPROCEDURE EVALUATION
12/28/2017  Ralph Hughes is a 32 y.o., female.    Anesthesia Evaluation    I have reviewed the Patient Summary Reports.    I have reviewed the Nursing Notes.   I have reviewed the Medications.     Review of Systems  Anesthesia Hx:  No problems with previous Anesthesia    Social:  Non-Smoker, No Alcohol Use    Hematology/Oncology:  Hematology Normal   Oncology Normal     EENT/Dental:EENT/Dental Normal   Cardiovascular:  Cardiovascular Normal Exercise tolerance: good     Pulmonary:   Asthma asymptomatic    Renal/:  Renal/ Normal     Hepatic/GI:   GERD, well controlled    Musculoskeletal:  Musculoskeletal Normal    Neurological:  Neurology Normal    Endocrine:  Endocrine Normal    Dermatological:  Skin Normal    Psych:  Psychiatric Normal           Physical Exam  General:  Well nourished    Airway/Jaw/Neck:  Airway Findings: Mouth Opening: Normal Tongue: Normal  General Airway Assessment: Adult  Mallampati: II  TM Distance: Normal, at least 6 cm  Jaw/Neck Findings:  Neck ROM: Normal ROM      Dental:  Dental Findings: In tact   Chest/Lungs:  Chest/Lungs Findings: Clear to auscultation, Normal Respiratory Rate     Heart/Vascular:  Heart Findings: Rate: Normal  Rhythm: Regular Rhythm  Sounds: Normal     Abdomen:  Abdomen Findings:  Normal       Mental Status:  Mental Status Findings:  Cooperative, Alert and Oriented         Anesthesia Plan  Type of Anesthesia, risks & benefits discussed:  Anesthesia Type:  general, spinal  Patient's Preference:   Intra-op Monitoring Plan: standard ASA monitors  Intra-op Monitoring Plan Comments:   Post Op Pain Control Plan:   Post Op Pain Control Plan Comments:   Induction:   IV  Beta Blocker:  Patient is not currently on a Beta-Blocker (No further documentation required).       Informed Consent: Patient understands risks and agrees with Anesthesia plan.  Questions  answered. Anesthesia consent signed with patient.  ASA Score: 2     Day of Surgery Review of History & Physical: I have interviewed and examined the patient. I have reviewed the patient's H&P dated: 12/28/2017. There are no significant changes.          Ready For Surgery From Anesthesia Perspective.

## 2017-12-28 NOTE — ANESTHESIA PROCEDURE NOTES
Spinal    Diagnosis: IUP/Previous  Section  Patient location during procedure: OR  Start time: 2017 9:56 AM  Timeout: 2017 9:56 AM  End time: 2017 10:07 AM  Staffing  Anesthesiologist: SANFORD FAJARDO  Performed: anesthesiologist   Preanesthetic Checklist  Completed: patient identified, surgical consent, pre-op evaluation, timeout performed, IV checked, risks and benefits discussed and monitors and equipment checked  Spinal Block  Patient position: sitting  Prep: Betadine  Patient monitoring: heart rate, cardiac monitor and continuous pulse ox  Approach: midline  Location: L3-4  Injection technique: single shot  CSF Fluid: clear free-flowing CSF  Needle  Needle type: Quincke   Needle gauge: 25 G  Needle length: 3.5 in  Additional Documentation: incremental injection and no paresthesia on injection  Needle localization: anatomical landmarks  Assessment  Sensory level: T5   Dermatomal levels determined by pinch or prick  Ease of block: easy  Patient's tolerance of the procedure: comfortable throughout block and no complaints  Medications:  Bolus administered: 1.6 mL of 0.75 and with dextrose bupivacaine  Epinephrine added: none  Opioid administered: 200 mcg of   morphine

## 2017-12-28 NOTE — SUBJECTIVE & OBJECTIVE
Obstetric HPI:  Patient reports None contractions, active fetal movement, No vaginal bleeding , No loss of fluid     This pregnancy has been complicated by history of  x 2, hx of left ectopic pregnancy, desires right salpingectomy for permanent sterilization, hx of HSV, and obesity.    Obstetric History       T1      L2     SAB0   TAB0   Ectopic1   Multiple0   Live Births2       # Outcome Date GA Lbr Vipin/2nd Weight Sex Delivery Anes PTL Lv   4 Current            3  06/15/11 34w0d  2.155 kg (4 lb 12 oz) F CS-LTranv EPI Y WENDI   2 Term 09 40w0d  3.459 kg (7 lb 10 oz) F CS-LTranv EPI N WENDI      Complications: Fetal Intolerance   1 Ectopic                 Past Medical History:   Diagnosis Date    Herpes simplex virus (HSV) infection      Past Surgical History:   Procedure Laterality Date     SECTION  03/05/2009    X 2    Ectopic Pregnacy Removal  10/13/2016       PTA Medications   Medication Sig    cholecalciferol, vitamin D3, 50,000 unit capsule Take 1 capsule (50,000 Units total) by mouth once a week.    promethazine (PHENERGAN) 25 MG tablet TAKE 1 TABLET (25 MG TOTAL) BY MOUTH EVERY 4 (FOUR) HOURS.    valACYclovir (VALTREX) 500 MG tablet Take 1 tablet (500 mg total) by mouth once daily.       Review of patient's allergies indicates:  No Known Allergies     Family History     Problem Relation (Age of Onset)    Cancer Mother    Hypertension Mother        Social History Main Topics    Smoking status: Never Smoker    Smokeless tobacco: Never Used    Alcohol use No    Drug use: No    Sexual activity: Yes     Partners: Male     Birth control/ protection: None     Review of Systems   Constitutional: Negative for activity change, fatigue, fever and unexpected weight change.   Gastrointestinal: Negative for abdominal pain, bloating, constipation, diarrhea, nausea and vomiting.   Endocrine: Negative for hair loss and hot flashes.   Genitourinary: Negative for dyspareunia,  dysuria, frequency, genital sores, hematuria, menorrhagia, menstrual problem, pelvic pain, urgency, vaginal bleeding, vaginal discharge, vaginal pain, dysmenorrhea, postcoital bleeding and vaginal odor.   Skin:  Negative for rash and hair changes.   Hematological: Negative for adenopathy.   Breast: Negative for breast mass, breast pain, nipple discharge and skin changes     Objective:     Vital Signs (Most Recent):    Vital Signs (24h Range):        Weight: 106.1 kg (234 lb)  Body mass index is 41.45 kg/m².    FHT: Cat 1 (reassuring)  TOCO:  none    Physical Exam:   Constitutional: She is oriented to person, place, and time. She appears well-developed and well-nourished. No distress.    HENT:   Head: Normocephalic and atraumatic.     Neck: Neck supple. No thyromegaly present.    Cardiovascular: Normal rate.  Exam reveals no clubbing, no cyanosis and no edema.     Pulmonary/Chest: Effort normal. No respiratory distress.        Abdominal: Soft. She exhibits no distension and no mass. There is no tenderness. There is no rebound and no guarding.   Uterus gravid, soft, and non-tender             Musculoskeletal: She exhibits no edema.       Neurological: She is alert and oriented to person, place, and time.    Skin: No rash noted. No cyanosis. Nails show no clubbing.    Psychiatric: She has a normal mood and affect. Her behavior is normal. Judgment and thought content normal.            Significant Labs:  Lab Results   Component Value Date    GROUPTRH O POS 12/28/2017    HEPBSAG Negative 04/28/2017    STREPBCULT No Group B Streptococcus isolated 12/05/2017    AFP 1.09 MoM (41.1 ng/mL) 09/23/2008       CBC:   Recent Labs  Lab 12/28/17  0845   WBC 5.42   RBC 3.69*   HGB 10.6*   HCT 32.4*      MCV 88   MCH 28.7   MCHC 32.7

## 2017-12-28 NOTE — TELEPHONE ENCOUNTER
Elisabeth with [employer] was calling to confirm  today for pateint to complete patient's claim.  I informed Elisabeth that is having  today.  She voiced understanding.

## 2017-12-28 NOTE — TRANSFER OF CARE
"Anesthesia Transfer of Care Note    Patient: Ralph Hughes    Procedure(s) Performed: Procedure(s) (LRB):  DELIVERY- SECTION WITH TUBAL (N/A)    Patient location: Labor and Delivery    Anesthesia Type: spinal    Transport from OR: Transported from OR on room air with adequate spontaneous ventilation    Post pain: adequate analgesia    Post assessment: no apparent anesthetic complications and tolerated procedure well    Post vital signs: stable    Level of consciousness: awake, alert and oriented    Nausea/Vomiting: no nausea/vomiting    Complications: none    Transfer of care protocol was followed      Last vitals:   Visit Vitals  BP (!) 111/48 (BP Location: Left arm, Patient Position: Lying)   Pulse 86   Temp 36.4 °C (97.6 °F) (Oral)   Resp 16   Ht 5' 3" (1.6 m)   Wt 106.1 kg (234 lb)   LMP 2017   SpO2 99%   BMI 41.45 kg/m²     "

## 2017-12-28 NOTE — OP NOTE
[facility]   Section   Operative Note    SUMMARY     Date of Procedure: 2017     Procedure: Procedure(s) (LRB):  DELIVERY- SECTION WITH TUBAL (N/A)    Surgeon(s) and Role:     * Lorna Pang MD - Primary    Assistant:  Amanda White PA-C, assistance necessary for completion of the case    Pre-Operative Diagnosis:   Previous  section [Z98.891]  Desires permanent sterilization    Post-Operative Diagnosis: Post-Op Diagnosis Codes:     * Previous  section [Z98.891]  Desires permanent sterilization    Anesthesia: Spinal/Epidural    Technical Procedures Used:   1.  Repeat low transverse  delivery  2.  Right salpingectomy           Description of the Findings of the Procedure: Left fallopian tube surgically absent.  Normal uterus, ovaries, and right fallopian tube.  Clear amniotic fluid.  Male infant, cephalic presentation, atraumatic delivery,  APGARS 9/9, 3 vessel cord.  Upon delivery, it was noted that the baby's right knee was hyperextended. Pediatrics consulted.  Placenta delivered spontaneously, intact.  Right mesosalpinx hemostatic at the end of the procedure.    Significant Surgical Tasks Conducted by the Assistant(s), if Applicable: Retraction, exposure, hemostasis, and closure    Complications: No    Blood Loss: 400 mL     With patient in supine position, the legs are  and Escobar Catheter placed and positioning to supine done.   Abdomen prepped with Chloroprep and 3 minute drying time allowed prior to draping of the abdomen.   Time out taken with OR team members.  Pfannenstiel Incision made through the skin, transverse fascial incision developed, rectus muscles  in the midline and the peritoneum entered.   no adhesions noted.  The lower uterine segment and position of the fetus identified.   Bladder flap taken down through transverse peritoneal incision.    Low Transverse Incision made through well developer lower uterine segment  and extended laterally with blunt dissection.   Clear fluid noted.  Infant delivered from vertex presentation.  Cord clamped after one minute and  handed to attending nurse.  Cord blood taken, placenta delivered.  The uterus wasnot exteriorized.  The edges of the uterine incision are grasped with Santos clamps at the angles and the inferior and superior midline edges of the incision.    Closure with running lock 0 Chromic, starting at each angle, tying in the midline.   Observation for bleeding with suture of any bleeding along the hysterotomy line.   With good hemostasis noted, the anterior pelvis is rinsed with sterile saline.   Right and left adnexa anatomy noted as above.  A second time out was performed, and patient confirmed she still desired removal of the right fallopian tube.  The right tube was grasped with the Sayra clamp.  The mesosalpinx was then serially doubly clamped, cut, and suture ligated with 2-0 plain gut working from the fimbriated end towards the cornual end of the tube.  The right fallopian tube was then sent to pathology for permanent section.  Inspection revealed good hemostasis.     Closure of the abdomen with 0 looped PDS starting each angle and tying the knot in the midline.  Skin closure with 4 0 Vicryl subcuticular.  Wound dressed with Aquasel and pressure dressing.          Specimens:   Right fallopian tube    Condition: Good    Disposition: PACU - hemodynamically stable.    Attestation: Good

## 2017-12-28 NOTE — L&D DELIVERY NOTE
[facility]   Section   Operative Note    SUMMARY     Date of Procedure: 2017     Procedure: Procedure(s) (LRB):  DELIVERY- SECTION WITH TUBAL (N/A)    Surgeon(s) and Role:     * Lorna Pang MD - Primary    Assistant:  Amanda White PA-C, assistance necessary for completion of the case    Pre-Operative Diagnosis:   Previous  section [Z98.891]  Desires permanent sterilization    Post-Operative Diagnosis: Post-Op Diagnosis Codes:     * Previous  section [Z98.891]  Desires permanent sterilization    Anesthesia: Spinal/Epidural    Technical Procedures Used:   1.  Repeat low transverse  delivery  2.  Right salpingectomy           Description of the Findings of the Procedure: Left fallopian tube surgically absent.  Normal uterus, ovaries, and right fallopian tube.  Clear amniotic fluid.  Male infant, cephalic presentation, atraumatic delivery,  APGARS 9/9, 3 vessel cord.  Upon delivery, it was noted that the baby's right knee was hyperextended. Pediatrics consulted.  Placenta delivered spontaneously, intact.  Right mesosalpinx hemostatic at the end of the procedure.    Significant Surgical Tasks Conducted by the Assistant(s), if Applicable: Retraction, exposure, hemostasis, and closure    Complications: No    Blood Loss: 400 mL     With patient in supine position, the legs are  and Escobar Catheter placed and positioning to supine done.   Abdomen prepped with Chloroprep and 3 minute drying time allowed prior to draping of the abdomen.   Time out taken with OR team members.  Pfannenstiel Incision made through the skin, transverse fascial incision developed, rectus muscles  in the midline and the peritoneum entered.   no adhesions noted.  The lower uterine segment and position of the fetus identified.   Bladder flap taken down through transverse peritoneal incision.    Low Transverse Incision made through well developer lower uterine segment  and extended laterally with blunt dissection.   Clear fluid noted.  Infant delivered from vertex presentation.  Cord clamped after one minute and  handed to attending nurse.  Cord blood taken, placenta delivered.  The uterus wasnot exteriorized.  The edges of the uterine incision are grasped with Santos clamps at the angles and the inferior and superior midline edges of the incision.    Closure with running lock 0 Chromic, starting at each angle, tying in the midline.   Observation for bleeding with suture of any bleeding along the hysterotomy line.   With good hemostasis noted, the anterior pelvis is rinsed with sterile saline.   Right and left adnexa anatomy noted as above.  A second time out was performed, and patient confirmed she still desired removal of the right fallopian tube.  The right tube was grasped with the [place] clamp.  The mesosalpinx was then serially doubly clamped, cut, and suture ligated with 2-0 plain gut working from the fimbriated end towards the cornual end of the tube.  The right fallopian tube was then sent to pathology for permanent section.  Inspection revealed good hemostasis.     Closure of the abdomen with 0 looped PDS starting each angle and tying the knot in the midline.  Skin closure with 4 0 Vicryl subcuticular.  Wound dressed with Aquasel and pressure dressing.          Specimens:   Right fallopian tube    Condition: Good    Disposition: PACU - hemodynamically stable.    Attestation: Good         Delivery Information for  Ruy Hughes    Birth information:  YOB: 2015   Time of birth: 10:31 AM   Sex: male   Head Delivery Date/Time:     Delivery type:    Gestational Age: 39w1d               Assessment    No data filed                          Interventions/Resuscitation         Cord    No data filed              Labor Events:       labor: No     Labor Onset Date/Time:         Dilation Complete Date/Time:         Start Pushing  Date/Time:       Rupture Date/Time:              Rupture type:           Fluid Amount:        Fluid Color:        Fluid Odor:        Membrane Status (PeriCalm):        Rupture Date/Time (PeriCalm):        Fluid Amount (PeriCalm):        Fluid Color (PeriCalm):         steroids: None     Antibiotics given for GBS: No     Induction:       Indications for induction:        Augmentation:       Indications for augmentation:       Labor complications:       Additional complications:          Cervical ripening:                     Delivery:      Episiotomy:       Indication for Episiotomy:       Perineal Lacerations:   Repaired:      Periurethral Laceration:   Repaired:     Labial Laceration:   Repaired:     Sulcus Laceration:   Repaired:     Vaginal Laceration:   Repaired:     Cervical Laceration:   Repaired:     Repair suture:       Repair # of packets:       Vaginal delivery QBL (mL):        QBL (mL): 400     Combined Blood Loss (mL): 400     Vaginal Sweep Performed:       Surgicount Correct:         Other providers:            Details (if applicable):  Trial of Labor      Categorization:      Priority:     Indications for :     Incision Type:       Additional  information:  Forceps:    Vacuum:    Breech:    Observed anomalies    Other (Comments):

## 2017-12-28 NOTE — HOSPITAL COURSE
12/28/17:  Patient underwent an uncomplicated repeat LTCS with right salpingectomy.  She was then transferred to the [facility] for routine post-op care.  12/29/17:  POD#1 s/p repeat LTCS with right salpingectomy.  Doing well post-op.  Requests discharge because the baby required transport for reduction of dislocated knee under anesthesia.  His procedure is planned for this evening.  Will discharge to home in stable condition this afternoon.

## 2017-12-28 NOTE — HPI
presents at 39w1d for scheduled, elective repeat  delivery with right salpingectomy for permanent sterilization.  Left fallopian tube surgically absent following salpingectomy for ectopic pregnancy.

## 2017-12-28 NOTE — ANESTHESIA RELEASE NOTE
"Anesthesia Release from PACU Note    Patient: Ralph Hughes    Procedure(s) Performed: Procedure(s) (LRB):  DELIVERY- SECTION WITH TUBAL (N/A)    Anesthesia type: spinal    Post pain: Adequate analgesia    Post assessment: no apparent anesthetic complications and tolerated procedure well    Last Vitals:   Visit Vitals  BP (!) 111/48 (BP Location: Left arm, Patient Position: Lying)   Pulse 86   Temp 36.4 °C (97.6 °F) (Oral)   Resp 16   Ht 5' 3" (1.6 m)   Wt 106.1 kg (234 lb)   LMP 2017   SpO2 99%   BMI 41.45 kg/m²       Post vital signs: stable    Level of consciousness: awake, alert  and oriented    Nausea/Vomiting: no nausea/no vomiting    Complications: none    Airway Patency: patent    Respiratory: unassisted, spontaneous ventilation, room air    Cardiovascular: stable and blood pressure at baseline    Hydration: euvolemic  "

## 2017-12-29 VITALS
HEIGHT: 63 IN | DIASTOLIC BLOOD PRESSURE: 71 MMHG | BODY MASS INDEX: 41.46 KG/M2 | WEIGHT: 234 LBS | HEART RATE: 113 BPM | RESPIRATION RATE: 16 BRPM | OXYGEN SATURATION: 100 % | SYSTOLIC BLOOD PRESSURE: 141 MMHG | TEMPERATURE: 99 F

## 2017-12-29 PROCEDURE — 63600175 PHARM REV CODE 636 W HCPCS: Performed by: OBSTETRICS & GYNECOLOGY

## 2017-12-29 PROCEDURE — 25000003 PHARM REV CODE 250: Performed by: OBSTETRICS & GYNECOLOGY

## 2017-12-29 RX ORDER — HYDROCODONE BITARTRATE AND ACETAMINOPHEN 5; 325 MG/1; MG/1
1 TABLET ORAL EVERY 6 HOURS PRN
Qty: 20 TABLET | Refills: 0 | Status: SHIPPED | OUTPATIENT
Start: 2017-12-29 | End: 2018-01-04 | Stop reason: SDUPTHER

## 2017-12-29 RX ORDER — IBUPROFEN 800 MG/1
800 TABLET ORAL EVERY 8 HOURS PRN
Qty: 30 TABLET | Refills: 0 | Status: SHIPPED | OUTPATIENT
Start: 2017-12-29 | End: 2018-03-15

## 2017-12-29 RX ADMIN — HYDROCODONE BITARTRATE AND ACETAMINOPHEN 1 TABLET: 10; 325 TABLET ORAL at 11:12

## 2017-12-29 RX ADMIN — KETOROLAC TROMETHAMINE 30 MG: 30 INJECTION, SOLUTION INTRAMUSCULAR at 12:12

## 2017-12-29 RX ADMIN — DOCUSATE SODIUM 100 MG: 100 CAPSULE, LIQUID FILLED ORAL at 08:12

## 2017-12-29 RX ADMIN — HYDROCODONE BITARTRATE AND ACETAMINOPHEN 1 TABLET: 5; 325 TABLET ORAL at 06:12

## 2017-12-29 NOTE — PHYSICIAN QUERY
PT Name: Ralph Hughes  MR #: [id number]     Physician Query Form - Documentation Clarification      CDS/: DARVIN Dejesus,[email or web address]            Contact information:[email or web address]    This form is a permanent document in the medical record.     Query Date: 2017    By submitting this query, we are merely seeking further clarification of documentation. Please utilize your independent clinical judgment when addressing the question(s) below.    The Medical record reflects the following:    Supporting Clinical Findings Location in Medical Record   HSV infection  On prophylactic valtrex.  No active lesions    Genitourinary: Negative for dyspareunia, dysuria, frequency, genital sores, hematuria, menorrhagia, menstrual problem, pelvic pain, urgency, vaginal bleeding, vaginal discharge, vaginal pain, dysmenorrhea, postcoital bleeding and vaginal odor.     PTA Medications:  valACYclovir (VALTREX) 500 MG tablet  Take 1 tablet (500 mg total) by mouth once daily    Technical Procedures Used:   1.  Repeat low transverse  delivery  2.  Right salpingectomy H&P                           Op note                                                                                       Doctor, Please specify type of Herpes simplex virus complicating childbirth    Provider Use Only      [ X ] Genital, unspecified  [  ] Oral  [  ] Vulva  [  ] Vagina  [  ] Cervix  [  ] Labia  [  ] Anus  [  ] Other, please specify:________________________________________                                                                                                                   [  ] Clinically undetermined

## 2017-12-29 NOTE — DISCHARGE INSTRUCTIONS
"Mother Self Care:    Activity: Avoid strenuous exercise and get adequate rest.  No driving until the physician consent given.  Emotional Changes: Most women find birth to be a time of great emotional upheaval.  Sense of loss, mood swings, fatigue, anxiety, and feeling "let down" are common.  If feelings worsen or last more than a week, call your physician.  Breast Care/Breastfeeding: Wear a bra for comfort.  Keep nipples dry and apply your own breast milk or lanolin cream as needed for soreness.  Engorgement can be relieved with warm, moist heat before feedings.  You may also take Ibuprofen.  Breast Care/Bottle Feeding: Wear support bra 24 hours a day for one week.  Avoid stimulation to breasts.  You may use ice packs for discomfort.  Phyllis-Care/Vaginal Bleeding: Remember to use your phyllis-bottle after urinating.  Your flow will change from red, to pink, to yellow/white color over a period of 2 weeks.  Menstruation will return in 3-8 weeks, or longer if breastfeeding.  Episiotomy Vaginal Delivery: Stitches will dissolve within 10 days to 3 weeks.  Warm baths, tucks, and dermoplast will promote healing.  Avoid bubble baths or strong soaps.   Section/Tubal Ligation: Keep incision clean and dry.  Please remove steri-strips in 5-7 days.  You may shower, but avoid baths.  Sexual Activity/Pelvic Rest: No sexual activity, tampons, or douching until your physician gives you consent.  Diet: Continue to eat from the five basic food groups, including plenty of protein, fruits, vegetables, and whole grains.  Limit empty calories and high fat foods.  Drink enough fluids to satisfy thirst and add an extra 500 calories for breastfeeding.  Constipation/Hemorrhoids: Drink plenty of water.  You may take a stool softener or natural laxative (Metamucil). You may use tucks or hemorrhoid ointment and soak in a warm tub.    CALL YOUR OB DOCTOR IF ANY OF THE FOLLOWING OCCURS:  *Heavy bleeding - saturating a pad an hour or passing any " large (2-3 inches in size) blood clots.  *Any pain, redness, or tenderness in lower leg.  *You cannot care for yourself or your baby.  *Any signs of infection-      - Temperature greater than 100.5 degrees F      - Foul smelling vaginal discharge and/or incisional drainage      - Increased episiotomy or incisional pain      - Hot, hard, red or sore area on breast      - Flu-like symptoms      - Any urgency, frequency or burning with urination

## 2017-12-29 NOTE — ASSESSMENT & PLAN NOTE
POD#1 s/p repeat LTCS with right salpingectomy.  Doing well post-op.  Desires discharge today so she can be with her baby for his procedure tonight.  Will discharge around noon today.  Pt meeting criteria to go home.

## 2017-12-29 NOTE — PROGRESS NOTES
Patient provided discharge teaching and AVS.  VSS.  NAD.  Patient brought down to car in wheelchair.  Prescription from [facility] received.

## 2017-12-29 NOTE — DISCHARGE SUMMARY
[facility] -   Obstetrics  Discharge Summary      Patient Name: Ralph Hughes  MRN: [id number]  Admission Date: 2017  Hospital Length of Stay: 1 days  Discharge Date and Time:  2017 7:16 AM  Attending Physician: Clay Keene MD   Discharging Provider: Lorna Pang MD  Primary Care Provider: LIV Larson Jr, MD    HPI:  presents at 39w1d for scheduled, elective repeat  delivery with right salpingectomy for permanent sterilization.  Left fallopian tube surgically absent following salpingectomy for ectopic pregnancy.    Procedure(s) (LRB):  DELIVERY- SECTION WITH TUBAL (N/A)     Hospital Course:   17:  Patient underwent an uncomplicated repeat LTCS with right salpingectomy.  She was then transferred to the [facility] for routine post-op care.  17:  POD#1 s/p repeat LTCS with right salpingectomy.  Doing well post-op.  Requests discharge because the baby required transport for reduction of dislocated knee under anesthesia.  His procedure is planned for this evening.  Will discharge to home in stable condition this afternoon.        Final Active Diagnoses:    Diagnosis Date Noted POA    PRINCIPAL PROBLEM:  Status post repeat low transverse  section with right salpingectomy [Z98.891] 2017 Not Applicable    HSV infection [B00.9] 2017 Yes    Pregnancy with history of ectopic pregnancy, antepartum [O09.10] 2017 Yes      Problems Resolved During this Admission:    Diagnosis Date Noted Date Resolved POA        Labs:   CBC   Recent Labs  Lab 17  0845   WBC 5.42   HGB 10.6*   HCT 32.4*          Feeding Method: bottle    Immunizations     None          Delivery:    Episiotomy: None   Lacerations: None   Repair suture:     Repair # of packets:     Blood loss (ml): 0     Birth information:  YOB: 2015   Time of birth: 10:31 AM   Sex: male   Delivery type: , Low Transverse   Gestational Age:  39w1d    Delivery Clinician:      Other providers:       Additional  information:  Forceps:    Vacuum:    Breech:    Observed anomalies      Living?:           APGARS  One minute Five minutes Ten minutes   Skin color:         Heart rate:         Grimace:         Muscle tone:         Breathing:         Totals: 9  9        Placenta: Delivered:       appearance    Pending Diagnostic Studies:     None          Discharged Condition: good    Disposition: Home or Self Care    Follow Up:  Follow-up Information     OB GYN NURSE, KRISTA In 1 week.    Why:  Dressing removal           Lorna Pang MD In 4 weeks.    Specialties:  Obstetrics and Gynecology, Obstetrics  Why:  For post-op check  Contact information:  [address] [place] [address] DR Bao BROOKE [id number] [phone]                 Patient Instructions:     Leave dressing on - Keep it clean, dry, and intact until clinic visit       Medications:  Current Discharge Medication List      START taking these medications    Details   hydrocodone-acetaminophen 5-325mg (NORCO) 5-325 mg per tablet Take 1 tablet by mouth every 6 (six) hours as needed for Pain.  Qty: 20 tablet, Refills: 0      ibuprofen (ADVIL,MOTRIN) 800 MG tablet Take 1 tablet (800 mg total) by mouth every 8 (eight) hours as needed for Pain.  Qty: 30 tablet, Refills: 0         CONTINUE these medications which have NOT CHANGED    Details   cholecalciferol, vitamin D3, 50,000 unit capsule Take 1 capsule (50,000 Units total) by mouth once a week.  Qty: 4 capsule, Refills: 6      promethazine (PHENERGAN) 25 MG tablet TAKE 1 TABLET (25 MG TOTAL) BY MOUTH EVERY 4 (FOUR) HOURS.  Qty: 30 tablet, Refills: 0         STOP taking these medications       valACYclovir (VALTREX) 500 MG tablet Comments:   Reason for Stopping:               Lorna Pang MD  Obstetrics  [facility]

## 2017-12-29 NOTE — SUBJECTIVE & OBJECTIVE
Hospital course: 12/28/17:  Patient underwent an uncomplicated repeat LTCS with right salpingectomy.  She was then transferred to the [facility] for routine post-op care.  12/29/17:  POD#1 s/p repeat LTCS with right salpingectomy.  Doing well post-op.  Requests discharge because the baby required transport for reduction of dislocated knee under anesthesia.  His procedure is planned for this evening.  Will discharge to home in stable condition this afternoon.    Interval History:     She is doing well this morning. She is tolerating a regular diet without nausea or vomiting. She is voiding spontaneously. She is ambulating. She has passed flatus, and has not a BM. Vaginal bleeding is mild. She denies fever or chills. Abdominal pain is mild and controlled with oral medications. She is not breastfeeding.     Objective:     Vital Signs (Most Recent):  Temp: 98.5 °F (36.9 °C) (12/29/17 0400)  Pulse: 98 (12/29/17 0400)  Resp: 18 (12/29/17 0400)  BP: (!) 114/59 (12/29/17 0400)  SpO2: 100 % (12/28/17 1329) Vital Signs (24h Range):  Temp:  [97.6 °F (36.4 °C)-98.8 °F (37.1 °C)] 98.5 °F (36.9 °C)  Pulse:  [] 98  Resp:  [16-20] 18  SpO2:  [16 %-100 %] 100 %  BP: ()/() 114/59     Weight: 106.1 kg (234 lb)  Body mass index is 41.45 kg/m².      Intake/Output Summary (Last 24 hours) at 12/29/17 0708  Last data filed at 12/29/17 0400   Gross per 24 hour   Intake             1500 ml   Output             1310 ml   Net              190 ml       Significant Labs:  Lab Results   Component Value Date    GROUPTRH O POS 12/28/2017    HEPBSAG Negative 04/28/2017    STREPBCULT No Group B Streptococcus isolated 12/05/2017    AFP 1.09 MoM (41.1 ng/mL) 09/23/2008       Recent Labs  Lab 12/28/17  0845   HGB 10.6*   HCT 32.4*       I have personallly reviewed all pertinent lab results from the last 24 hours.    Physical Exam:   Constitutional: She is oriented to person, place, and time. She appears well-developed and well-nourished. No  distress.    HENT:   Head: Normocephalic and atraumatic.       Pulmonary/Chest: Effort normal.        Abdominal: Soft. She exhibits abdominal incision (Aquasel dressing clean and dry). She exhibits no distension and no mass. There is no tenderness. There is no rebound and no guarding.   Fundus firm, non-tender             Musculoskeletal: She exhibits no edema.       Neurological: She is alert and oriented to person, place, and time.    Skin: No rash noted.    Psychiatric: She has a normal mood and affect. Her behavior is normal. Judgment and thought content normal.

## 2017-12-29 NOTE — PLAN OF CARE
Problem: Patient Care Overview  Goal: Plan of Care Review  Outcome: Ongoing (interventions implemented as appropriate)  Pt is progressing well. Pain is controlled with IV Toradol but she would like to start taking ibuprofen instead. Ambulates independently and voids without difficulty. Dressing remains dry and intact with no drainage noted. Bleeding has been light. Wants to be discharged today so she can visit her baby at [facility]. Has been coping well with her baby being gone. VSS. Will continue to monitor.

## 2017-12-29 NOTE — PROGRESS NOTES
[facility] -   Obstetrics  Postpartum Progress Note    Patient Name: Ralph Hughes  MRN: [id number]  Admission Date: 2017  Hospital Length of Stay: 1 days  Attending Physician: Clay Keene MD  Primary Care Provider: LIV Larson Jr, MD    Subjective:     Principal Problem:Status post repeat low transverse  section    Hospital course: 17:  Patient underwent an uncomplicated repeat LTCS with right salpingectomy.  She was then transferred to the [facility] for routine post-op care.  17:  POD#1 s/p repeat LTCS with right salpingectomy.  Doing well post-op.  Requests discharge because the baby required transport for reduction of dislocated knee under anesthesia.  His procedure is planned for this evening.  Will discharge to home in stable condition this afternoon.    Interval History:     She is doing well this morning. She is tolerating a regular diet without nausea or vomiting. She is voiding spontaneously. She is ambulating. She has passed flatus, and has not a BM. Vaginal bleeding is mild. She denies fever or chills. Abdominal pain is mild and controlled with oral medications. She is not breastfeeding.     Objective:     Vital Signs (Most Recent):  Temp: 98.5 °F (36.9 °C) (17 0400)  Pulse: 98 (17 0400)  Resp: 18 (17 0400)  BP: (!) 114/59 (17 0400)  SpO2: 100 % (17 1329) Vital Signs (24h Range):  Temp:  [97.6 °F (36.4 °C)-98.8 °F (37.1 °C)] 98.5 °F (36.9 °C)  Pulse:  [] 98  Resp:  [16-20] 18  SpO2:  [16 %-100 %] 100 %  BP: ()/() 114/59     Weight: 106.1 kg (234 lb)  Body mass index is 41.45 kg/m².      Intake/Output Summary (Last 24 hours) at 17 0708  Last data filed at 17 0400   Gross per 24 hour   Intake             1500 ml   Output             1310 ml   Net              190 ml       Significant Labs:  Lab Results   Component Value Date    GROUPTRH O POS 2017    HEPBSAG Negative 2017    STREPBCULT No  Group B Streptococcus isolated 2017    AFP 1.09 MoM (41.1 ng/mL) 2008       Recent Labs  Lab 17  0845   HGB 10.6*   HCT 32.4*       I have personallly reviewed all pertinent lab results from the last 24 hours.    Physical Exam:   Constitutional: She is oriented to person, place, and time. She appears well-developed and well-nourished. No distress.    HENT:   Head: Normocephalic and atraumatic.       Pulmonary/Chest: Effort normal.        Abdominal: Soft. She exhibits abdominal incision (Aquasel dressing clean and dry). She exhibits no distension and no mass. There is no tenderness. There is no rebound and no guarding.   Fundus firm, non-tender             Musculoskeletal: She exhibits no edema.       Neurological: She is alert and oriented to person, place, and time.    Skin: No rash noted.    Psychiatric: She has a normal mood and affect. Her behavior is normal. Judgment and thought content normal.       Assessment/Plan:     32 y.o. female  for:    * Status post repeat low transverse  section with right salpingectomy    POD#1 s/p repeat LTCS with right salpingectomy.  Doing well post-op.  Desires discharge today so she can be with her baby for his procedure tonight.  Will discharge around noon today.  Pt meeting criteria to go home.        HSV infection    On prophylactic valtrex.  No active lesions.        Pregnancy with history of ectopic pregnancy, antepartum    Left fallopian tube surgically absent.  Desires right salpingectomy for sterilization.            Disposition: As patient meets milestones, will plan to discharge today.    Lorna Pang MD  Obstetrics  [facility]

## 2018-01-02 ENCOUNTER — HOSPITAL ENCOUNTER (EMERGENCY)
Facility: HOSPITAL | Age: 33
Discharge: HOME | End: 2018-01-02
Payer: COMMERCIAL

## 2018-01-02 ENCOUNTER — PATIENT MESSAGE (OUTPATIENT)
Dept: OBSTETRICS AND GYNECOLOGY | Facility: CLINIC | Age: 33
End: 2018-01-02

## 2018-01-02 VITALS
WEIGHT: 231.06 LBS | RESPIRATION RATE: 20 BRPM | OXYGEN SATURATION: 97 % | SYSTOLIC BLOOD PRESSURE: 146 MMHG | TEMPERATURE: 99 F | HEART RATE: 78 BPM | BODY MASS INDEX: 40.94 KG/M2 | DIASTOLIC BLOOD PRESSURE: 85 MMHG | HEIGHT: 63 IN

## 2018-01-02 DIAGNOSIS — M79.605 PAIN OF LEFT LOWER EXTREMITY: Primary | ICD-10-CM

## 2018-01-02 DIAGNOSIS — M79.606 LEG PAIN: ICD-10-CM

## 2018-01-02 PROCEDURE — 99284 EMERGENCY DEPT VISIT MOD MDM: CPT

## 2018-01-02 RX ORDER — NAPROXEN 500 MG/1
500 TABLET ORAL 2 TIMES DAILY WITH MEALS
Qty: 60 TABLET | Refills: 0 | Status: SHIPPED | OUTPATIENT
Start: 2018-01-02 | End: 2018-02-13

## 2018-01-02 NOTE — ED PROVIDER NOTES
SCRIBE #1 NOTE: I, Haylee Davis, am scribing for, and in the presence of, Vinnie Lopez NP. I have scribed the entire note.      History      Chief Complaint   Patient presents with    Leg Pain     patient reports  on thursday and started to have leg pain and increased swelling       Review of patient's allergies indicates:  No Known Allergies     HPI   HPI    2018, 3:54 PM   History obtained from the patient      History of Present Illness: Ralph Hughes is a 32 y.o. female patient who presents to the Emergency Department for L leg pain which onset gradually a few days ago. Symptoms are constant and moderate in severity. Pt reports having a  5 days ago. No mitigating or exacerbating factors reported. Associated sxs include increased swelling to L leg. Patient denies any numbness/weakness, CP, SOB, fever, chills, n/v/d, abd pain, and all other sxs at this time. No prior Tx. No further complaints or concerns at this time.       Arrival mode: Personal vehicle      PCP: LIV Larson Jr, MD       Past Medical History:  Past Medical History:   Diagnosis Date    Herpes simplex virus (HSV) infection        Past Surgical History:  Past Surgical History:   Procedure Laterality Date     SECTION  03/05/2009    X 2    Ectopic Pregnacy Removal  10/13/2016         Family History:  Family History   Problem Relation Age of Onset    Hypertension Mother     Cancer Mother     Breast cancer Neg Hx     Colon cancer Neg Hx     Ovarian cancer Neg Hx     Thrombophilia Neg Hx        Social History:  Social History     Social History Main Topics    Smoking status: Never Smoker    Smokeless tobacco: Never Used    Alcohol use No    Drug use: No    Sexual activity: Yes     Partners: Male     Birth control/ protection: None       ROS   Review of Systems   Constitutional: Negative for chills and fever.   HENT: Negative for sore throat.    Respiratory: Negative for shortness of breath.     Cardiovascular: Negative for chest pain.   Gastrointestinal: Negative for abdominal pain, diarrhea, nausea and vomiting.   Genitourinary: Negative for dysuria.   Musculoskeletal: Negative for back pain.        (+) L leg pain  (+) L leg swelling   Skin: Negative for rash.   Neurological: Negative for weakness and numbness.   Hematological: Does not bruise/bleed easily.   All other systems reviewed and are negative.    Physical Exam      Initial Vitals [01/02/18 1457]   BP Pulse Resp Temp SpO2   (!) 146/85 78 20 98.5 °F (36.9 °C) 97 %      MAP       105.33          Physical Exam  Nursing Notes and Vital Signs Reviewed.  Constitutional: Patient is in no acute distress. Well-developed and well-nourished.  Head: Atraumatic. Normocephalic.  Eyes: PERRL. EOM intact. Conjunctivae are not pale. No scleral icterus.  ENT: Mucous membranes are moist. Oropharynx is clear and symmetric.    Neck: Supple. Full ROM. No lymphadenopathy.  Cardiovascular: Regular rate. Regular rhythm. No murmurs, rubs, or gallops. Distal pulses are 2+ and symmetric.  Pulmonary/Chest: No respiratory distress. Clear to auscultation bilaterally. No wheezing or rales.  Abdominal: Soft and non-distended.  There is no tenderness.  No rebound, guarding, or rigidity. Good bowel sounds.  Genitourinary: No CVA tenderness  Musculoskeletal: Moves all extremities. No obvious deformities. No edema. No calf tenderness.  LLE: no evident deformity. Tenderness to posterior calf. No edema or erythema. ROM is normal. Cap refill distally is <2 seconds. DP and PT pulses are equal and 2+ bilaterally. No motor deficit. No distal sensory deficit   Skin: Warm and dry.  Neurological:  Alert, awake, and appropriate.  Normal speech.  No acute focal neurological deficits are appreciated.  Psychiatric: Normal affect. Good eye contact. Appropriate in content.    ED Course    Procedures  ED Vital Signs:  Vitals:    01/02/18 1457   BP: (!) 146/85   Pulse: 78   Resp: 20   Temp: 98.5  "°F (36.9 °C)   TempSrc: Oral   SpO2: 97%   Weight: 104.8 kg (231 lb 0.7 oz)   Height: 5' 3" (1.6 m)       Imaging Results:  Imaging Results          US Lower Extremity Veins Left (Final result)  Result time 01/02/18 17:19:49    Final result by Roe Rolle MD (01/02/18 17:19:49)                 Impression:     No evidence of deep venous thrombosis left lower extremity.          Electronically signed by: ROE ROLEL  Date:     01/02/18  Time:    17:19              Narrative:    A Exam: US LOWER EXTREMITY VEINS LEFT     Indication: M79.606 Pain in leg, unspecified; pain in left leg.    Findings: Comparison study of 12/20/2017.  Once again, there is documented compressibility and color Doppler flow with normal venous waveform and good calf augmentation response left lower extremity.                                      The Emergency Provider reviewed the vital signs and test results, which are outlined above.    ED Discussion     5:24 PM: Discussed with pt all pertinent ED information and results. Discussed pt dx and plan of tx. Gave pt all f/u and return to the ED instructions. All questions and concerns were addressed at this time. Pt expresses understanding of information and instructions, and is comfortable with plan to discharge. Pt is stable for discharge.        ED Medication(s):  Medications - No data to display    New Prescriptions    NAPROXEN (NAPROSYN) 500 MG TABLET    Take 1 tablet (500 mg total) by mouth 2 (two) times daily with meals.       Follow-up Information     E Edmar Larson Jr, MD. Schedule an appointment as soon as possible for a visit in 2 days.    Specialties:  Internal Medicine, Pediatrics  Contact information:  [address]  [place] [address] [phone]             [facility]    Specialty:  Emergency Medicine  Why:  If symptoms worsen  Contact information:  [address]  [facility] [address] [phone]                   Medical " Decision Making    Medical Decision Making:   Clinical Tests:   Radiological Study: Ordered and Reviewed           Scribe Attestation:   Scribe #1: I performed the above scribed service and the documentation accurately describes the services I performed. I attest to the accuracy of the note.    Attending:   Physician Attestation Statement for Scribe #1: I, Vinnie Lopez NP, personally performed the services described in this documentation, as scribed by Haylee Davis, in my presence, and it is both accurate and complete.          Clinical Impression       ICD-10-CM ICD-9-CM   1. Pain of left lower extremity M79.605 729.5   2. Leg pain M79.606 729.5       Disposition:   Disposition: Discharged  Condition: Stable         Vinnie Lopez NP  01/02/18 1918

## 2018-01-02 NOTE — TELEPHONE ENCOUNTER
Spoke with pt, states that her left ankle is more swollen than her right ankle, her left calf hurts and it is warm to the touch. Pt states that she is also having the same pain in her right arm. Advised pt to report to the ED per Dr. Lorna Pang.

## 2018-01-04 ENCOUNTER — CLINICAL SUPPORT (OUTPATIENT)
Dept: OBSTETRICS AND GYNECOLOGY | Facility: CLINIC | Age: 33
End: 2018-01-04
Payer: COMMERCIAL

## 2018-01-04 ENCOUNTER — TELEPHONE (OUTPATIENT)
Dept: OBSTETRICS AND GYNECOLOGY | Facility: CLINIC | Age: 33
End: 2018-01-04

## 2018-01-04 VITALS — DIASTOLIC BLOOD PRESSURE: 78 MMHG | SYSTOLIC BLOOD PRESSURE: 120 MMHG

## 2018-01-04 DIAGNOSIS — G89.18 POSTOPERATIVE PAIN: Primary | ICD-10-CM

## 2018-01-04 DIAGNOSIS — Z98.890 POST-OPERATIVE STATE: Primary | ICD-10-CM

## 2018-01-04 PROCEDURE — 99999 PR PBB SHADOW E&M-EST. PATIENT-LVL II: CPT | Mod: PBBFAC,,,

## 2018-01-04 RX ORDER — CYCLOBENZAPRINE HCL 10 MG
10 TABLET ORAL 3 TIMES DAILY PRN
Refills: 1 | COMMUNITY
Start: 2017-12-13 | End: 2018-01-26

## 2018-01-04 RX ORDER — HYDROCODONE BITARTRATE AND ACETAMINOPHEN 5; 325 MG/1; MG/1
1 TABLET ORAL EVERY 6 HOURS PRN
Qty: 10 TABLET | Refills: 0 | Status: SHIPPED | OUTPATIENT
Start: 2018-01-04 | End: 2018-02-13

## 2018-01-04 NOTE — PROGRESS NOTES
Patient presents to clinic today for dressing removal following a repeat  on 17.  Dressing removed without difficulty.  Patient tolerated well.  Incision was well approximated and without signs of infection.  Patient states she is alternating breast and bottle and denies complaints with her breasts.  She reports that she is voiding without difficulty and has had a few bowel movements since delivery.  Her only concern is she is almost out of her pain medicne (Norco) and feels like she needs a few more.  A message is sent to Dr. Lorna Rice for advice.  Patient is instructed to call the office for questions or concerns.  Patient verbalized understanding of advice.

## 2018-01-04 NOTE — TELEPHONE ENCOUNTER
Patient came for dressing removal today following repeat  and right salpingectomy on 17.  She is doing well but is concerned because she is almost out of pain medicine (Norco) and feels like she needs a few more.  She states is taking Naproxen for her leg pain (rx'd by ED) but it is not covering her incisional pain.  Pharmacy is verified.

## 2018-01-08 ENCOUNTER — PATIENT MESSAGE (OUTPATIENT)
Dept: FAMILY MEDICINE | Facility: CLINIC | Age: 33
End: 2018-01-08

## 2018-01-08 RX ORDER — FLUTICASONE PROPIONATE AND SALMETEROL 100; 50 UG/1; UG/1
1 POWDER RESPIRATORY (INHALATION) 2 TIMES DAILY
Qty: 60 EACH | Refills: 6 | Status: SHIPPED | OUTPATIENT
Start: 2018-01-08 | End: 2018-03-15

## 2018-01-12 ENCOUNTER — PATIENT MESSAGE (OUTPATIENT)
Dept: OBSTETRICS AND GYNECOLOGY | Facility: CLINIC | Age: 33
End: 2018-01-12

## 2018-01-15 ENCOUNTER — PATIENT MESSAGE (OUTPATIENT)
Dept: OBSTETRICS AND GYNECOLOGY | Facility: CLINIC | Age: 33
End: 2018-01-15

## 2018-01-26 ENCOUNTER — POSTPARTUM VISIT (OUTPATIENT)
Dept: OBSTETRICS AND GYNECOLOGY | Facility: CLINIC | Age: 33
End: 2018-01-26
Payer: COMMERCIAL

## 2018-01-26 VITALS
DIASTOLIC BLOOD PRESSURE: 84 MMHG | SYSTOLIC BLOOD PRESSURE: 126 MMHG | BODY MASS INDEX: 37.42 KG/M2 | WEIGHT: 211.19 LBS | HEIGHT: 63 IN

## 2018-01-26 DIAGNOSIS — Z98.891 STATUS POST REPEAT LOW TRANSVERSE CESAREAN SECTION: Primary | ICD-10-CM

## 2018-01-26 PROBLEM — R53.81 MALAISE AND FATIGUE: Status: RESOLVED | Noted: 2017-12-12 | Resolved: 2018-01-26

## 2018-01-26 PROBLEM — O99.210 OBESITY COMPLICATING PREGNANCY: Status: RESOLVED | Noted: 2017-05-15 | Resolved: 2018-01-26

## 2018-01-26 PROBLEM — L29.9 PRURITUS OF PREGNANCY IN THIRD TRIMESTER: Status: RESOLVED | Noted: 2017-12-05 | Resolved: 2018-01-26

## 2018-01-26 PROBLEM — B00.9 HSV INFECTION: Status: RESOLVED | Noted: 2017-12-05 | Resolved: 2018-01-26

## 2018-01-26 PROBLEM — O09.10 PREGNANCY WITH HISTORY OF ECTOPIC PREGNANCY, ANTEPARTUM: Status: RESOLVED | Noted: 2017-04-28 | Resolved: 2018-01-26

## 2018-01-26 PROBLEM — O99.713 PRURITUS OF PREGNANCY IN THIRD TRIMESTER: Status: RESOLVED | Noted: 2017-12-05 | Resolved: 2018-01-26

## 2018-01-26 PROBLEM — R53.83 MALAISE AND FATIGUE: Status: RESOLVED | Noted: 2017-12-12 | Resolved: 2018-01-26

## 2018-01-26 PROCEDURE — 99999 PR PBB SHADOW E&M-EST. PATIENT-LVL III: CPT | Mod: PBBFAC,,, | Performed by: OBSTETRICS & GYNECOLOGY

## 2018-01-26 PROCEDURE — 0503F POSTPARTUM CARE VISIT: CPT | Mod: S$GLB,,, | Performed by: OBSTETRICS & GYNECOLOGY

## 2018-01-26 NOTE — PROGRESS NOTES
"CC: Post-partum follow-up    Ralph Hughes is a 32 y.o. female  who presents for post-partum visit.  She is S/P a repeat  delivery with right salpingectomy.  She and the baby are doing well.  Baby had a right knee dislocation at birth and required transport to  for reduction under anesthesia.  The baby is doing great, and no longer needs a splint. No pain.  No fever.   No bowel / bladder complaints.    Delivery Date: 2017  Delivery MD: Dr. Lorna Pang  Gender: male  Birth Weight: 7 pounds 3 ounces  Breast Feeding: NO  Depression: NO  Contraception: bilateral salpingectomy    Pregnancy was complicated by:  Hx of left ectopic pregnancy treated with left salpingectomy  Hx of C/S  Desires permanent sterilization with right salpingectomy    /84   Ht 5' 3" (1.6 m)   Wt 95.8 kg (211 lb 3.2 oz)   LMP 2017   BMI 37.41 kg/m²     ROS:  GENERAL: No fever, chills, fatigability.  VULVAR: No pain, no lesions and no itching.  VAGINAL: No relaxation, no itching, no discharge, no abnormal bleeding and no lesions.  ABDOMEN: No abdominal pain. Denies nausea. Denies vomiting. No diarrhea. No constipation  BREAST: Denies pain. No lumps. No discharge.  URINARY: No incontinence, no nocturia, no frequency and no dysuria.  CARDIOVASCULAR: No chest pain. No shortness of breath. No leg cramps.  NEUROLOGICAL: No headaches. No vision changes.    PHYSICAL EXAM:  INCISION: well-healed, intact  ABDOMEN:  Soft, non-tender, non-distended  VULVA:  Normal, no lesions  CERVIX:  Without lesions, polyps or tenderness.  UTERUS:  Normal size, shape, consistency, no mass or tenderness.  ADNEXA:  Normal in size without mass or tenderness    Ralph was seen today for postpartum care.    Diagnoses and all orders for this visit:    Status post repeat low transverse  section with right salpingectomy    Doing well post-op.    PLAN:  May resume normal activities    RTC 1 year.            "

## 2018-02-05 ENCOUNTER — PATIENT MESSAGE (OUTPATIENT)
Dept: OBSTETRICS AND GYNECOLOGY | Facility: CLINIC | Age: 33
End: 2018-02-05

## 2018-02-08 ENCOUNTER — PATIENT MESSAGE (OUTPATIENT)
Dept: OBSTETRICS AND GYNECOLOGY | Facility: CLINIC | Age: 33
End: 2018-02-08

## 2018-02-11 ENCOUNTER — PATIENT MESSAGE (OUTPATIENT)
Dept: FAMILY MEDICINE | Facility: CLINIC | Age: 33
End: 2018-02-11

## 2018-02-13 ENCOUNTER — OFFICE VISIT (OUTPATIENT)
Dept: OBSTETRICS AND GYNECOLOGY | Facility: CLINIC | Age: 33
End: 2018-02-13
Payer: COMMERCIAL

## 2018-02-13 VITALS
HEIGHT: 63 IN | SYSTOLIC BLOOD PRESSURE: 120 MMHG | BODY MASS INDEX: 38.39 KG/M2 | WEIGHT: 216.69 LBS | DIASTOLIC BLOOD PRESSURE: 78 MMHG

## 2018-02-13 DIAGNOSIS — R10.31 RIGHT LOWER QUADRANT ABDOMINAL PAIN: Primary | ICD-10-CM

## 2018-02-13 PROCEDURE — 99024 POSTOP FOLLOW-UP VISIT: CPT | Mod: S$GLB,,, | Performed by: OBSTETRICS & GYNECOLOGY

## 2018-02-13 PROCEDURE — 99999 PR PBB SHADOW E&M-EST. PATIENT-LVL II: CPT | Mod: PBBFAC,,, | Performed by: OBSTETRICS & GYNECOLOGY

## 2018-02-13 PROCEDURE — 3008F BODY MASS INDEX DOCD: CPT | Mod: S$GLB,,, | Performed by: OBSTETRICS & GYNECOLOGY

## 2018-02-13 NOTE — PROGRESS NOTES
Subjective:       Patient ID: Ralph Hughes is a 32 y.o. female.    Chief Complaint:  Abdominal Pain      History of Present Illness  HPI  Pt is s/p RLTCS with Dr. Pang on 17.  Pt reports complaints of RLQ abdominal pain/sensitivity to touch (lateral to incision angle).  Pains began 2 weeks ago and have been stable.  Denies any other associated symptoms.      GYN & OB History  No LMP recorded.   Date of Last Pap: 2017    OB History    Para Term  AB Living   4 3 2 1 1 3   SAB TAB Ectopic Multiple Live Births   0 0 1 0 3      # Outcome Date GA Lbr Vipin/2nd Weight Sex Delivery Anes PTL Lv   4 Term 17 39w1d  3.26 kg (7 lb 3 oz) M CS-LTranv Spinal  WENDI   3  06/15/11 34w0d  2.155 kg (4 lb 12 oz) F CS-LTranv EPI Y WENDI   2 Term 09 40w0d  3.459 kg (7 lb 10 oz) F CS-LTranv EPI N WENDI      Complications: Fetal Intolerance   1 Ectopic                   Review of Systems  Review of Systems   Constitutional: Negative for activity change, appetite change, fatigue, fever and unexpected weight change.   Respiratory: Negative for shortness of breath.    Cardiovascular: Negative for chest pain, palpitations and leg swelling.   Gastrointestinal: Positive for abdominal pain. Negative for bloating, blood in stool, constipation, diarrhea, nausea and vomiting.   Genitourinary: Negative for dyspareunia, dysuria, flank pain, frequency, genital sores, hematuria, menorrhagia, menstrual problem, pelvic pain, vaginal bleeding, vaginal discharge, vaginal pain, dysmenorrhea, urinary incontinence and vaginal odor.   Neurological: Negative for syncope and headaches.           Objective:    Physical Exam:   Constitutional: She is oriented to person, place, and time. She appears well-developed and well-nourished. No distress.       Cardiovascular: Normal rate and regular rhythm.     Pulmonary/Chest: Effort normal.        Abdominal: Soft. Bowel sounds are normal. She exhibits abdominal incision  (well healed scar without signs of infection, henria, or separation). She exhibits no distension and no mass. There is no tenderness. There is no rebound and no guarding. No hernia.                 Neurological: She is alert and oriented to person, place, and time.    Skin: Skin is warm.    Psychiatric: She has a normal mood and affect. Her behavior is normal. Thought content normal.          Assessment:        1. Right lower quadrant abdominal pain             Plan:      Right lower quadrant abdominal pain  -    Pains are likely related to irritation/inflammation associated with recent surgery.  No evidence of infection or separation on exam today.  Wound has completely healed.  Pt was reassured and was counseled on use of warming pads and OTC NSAIDs.     Follow-up if symptoms worsen or fail to improve.

## 2018-03-15 ENCOUNTER — OFFICE VISIT (OUTPATIENT)
Dept: FAMILY MEDICINE | Facility: CLINIC | Age: 33
End: 2018-03-15
Payer: COMMERCIAL

## 2018-03-15 ENCOUNTER — LAB VISIT (OUTPATIENT)
Dept: LAB | Facility: HOSPITAL | Age: 33
End: 2018-03-15
Attending: INTERNAL MEDICINE
Payer: COMMERCIAL

## 2018-03-15 VITALS
TEMPERATURE: 99 F | OXYGEN SATURATION: 99 % | DIASTOLIC BLOOD PRESSURE: 82 MMHG | HEIGHT: 63 IN | RESPIRATION RATE: 18 BRPM | SYSTOLIC BLOOD PRESSURE: 120 MMHG | WEIGHT: 207.44 LBS | HEART RATE: 82 BPM | BODY MASS INDEX: 36.75 KG/M2

## 2018-03-15 DIAGNOSIS — E55.9 VITAMIN D DEFICIENCY DISEASE: Chronic | ICD-10-CM

## 2018-03-15 DIAGNOSIS — J45.909 ASTHMA IN ADULT WITHOUT COMPLICATION, UNSPECIFIED ASTHMA SEVERITY, UNSPECIFIED WHETHER PERSISTENT: Primary | ICD-10-CM

## 2018-03-15 DIAGNOSIS — Z00.00 ROUTINE ADULT HEALTH MAINTENANCE: ICD-10-CM

## 2018-03-15 LAB
25(OH)D3+25(OH)D2 SERPL-MCNC: 34 NG/ML
ESTIMATED AVG GLUCOSE: 105 MG/DL
HBA1C MFR BLD HPLC: 5.3 %
TSH SERPL DL<=0.005 MIU/L-ACNC: 1.01 UIU/ML

## 2018-03-15 PROCEDURE — 36415 COLL VENOUS BLD VENIPUNCTURE: CPT | Mod: PO

## 2018-03-15 PROCEDURE — 99395 PREV VISIT EST AGE 18-39: CPT | Mod: S$GLB,,, | Performed by: INTERNAL MEDICINE

## 2018-03-15 PROCEDURE — 99999 PR PBB SHADOW E&M-EST. PATIENT-LVL III: CPT | Mod: PBBFAC,,, | Performed by: INTERNAL MEDICINE

## 2018-03-15 PROCEDURE — 83036 HEMOGLOBIN GLYCOSYLATED A1C: CPT

## 2018-03-15 PROCEDURE — 82306 VITAMIN D 25 HYDROXY: CPT

## 2018-03-15 PROCEDURE — 84443 ASSAY THYROID STIM HORMONE: CPT

## 2018-03-15 NOTE — PROGRESS NOTES
Subjective:       Patient ID: Ralph Hughes is a 32 y.o. female.    Chief Complaint: Annual Exam; Asthma; and Vitamin D Deficiency    Asthma   There is no cough, shortness of breath or wheezing. Pertinent negatives include no appetite change, chest pain, ear pain, fever, headaches, myalgias, postnasal drip, rhinorrhea, sneezing, sore throat or trouble swallowing. Her past medical history is significant for asthma.     Review of Systems   Constitutional: Negative for activity change, appetite change, chills, diaphoresis, fatigue, fever and unexpected weight change.   HENT: Negative for congestion, drooling, ear discharge, ear pain, facial swelling, hearing loss, mouth sores, nosebleeds, postnasal drip, rhinorrhea, sinus pressure, sneezing, sore throat, tinnitus, trouble swallowing and voice change.    Eyes: Negative for photophobia, discharge, redness and visual disturbance.   Respiratory: Negative for apnea, cough, choking, chest tightness, shortness of breath and wheezing.    Cardiovascular: Negative for chest pain, palpitations and leg swelling.   Gastrointestinal: Negative for abdominal distention, abdominal pain, blood in stool, constipation, diarrhea, nausea and vomiting.   Endocrine: Negative for cold intolerance, heat intolerance, polydipsia, polyphagia and polyuria.   Genitourinary: Negative for decreased urine volume, difficulty urinating, dysuria, flank pain, frequency, genital sores, hematuria and urgency.   Musculoskeletal: Negative for arthralgias, back pain, gait problem, joint swelling, myalgias, neck pain and neck stiffness.   Skin: Negative for color change, pallor, rash and wound.   Allergic/Immunologic: Negative for food allergies and immunocompromised state.   Neurological: Negative for dizziness, tremors, seizures, syncope, speech difficulty, weakness, light-headedness, numbness and headaches.   Hematological: Negative for adenopathy. Does not bruise/bleed easily.    Psychiatric/Behavioral: Negative for agitation, behavioral problems, confusion, decreased concentration, dysphoric mood, hallucinations, self-injury, sleep disturbance and suicidal ideas. The patient is not nervous/anxious and is not hyperactive.    All other systems reviewed and are negative.      Objective:      Physical Exam   Constitutional: She is oriented to person, place, and time. She appears well-developed and well-nourished. No distress.   HENT:   Head: Normocephalic and atraumatic.   Neck: Normal range of motion. Neck supple. No JVD present. Carotid bruit is not present. No tracheal deviation present. No thyromegaly present.   Cardiovascular: Normal rate, regular rhythm, normal heart sounds and intact distal pulses.    Pulmonary/Chest: Effort normal and breath sounds normal. No respiratory distress. She has no wheezes. She has no rales. She exhibits no tenderness.   Abdominal: Soft. Bowel sounds are normal. She exhibits no distension. There is no tenderness. There is no rebound and no guarding.   Musculoskeletal: Normal range of motion. She exhibits no edema or tenderness.   Lymphadenopathy:     She has no cervical adenopathy.   Neurological: She is alert and oriented to person, place, and time.   Skin: Skin is warm and dry. No rash noted. She is not diaphoretic. No erythema. No pallor.   Psychiatric: She has a normal mood and affect. Her behavior is normal. Judgment and thought content normal.   Nursing note and vitals reviewed.      Assessment:       1. Asthma in adult without complication, unspecified asthma severity, unspecified whether persistent    2. Vitamin D deficiency disease    3. Routine adult health maintenance        Plan:         stable----------watch diet,exercise.                 Notes/labs reviewed.             Check hga1c,vitd,tsh.         F/u prn.

## 2018-03-16 ENCOUNTER — TELEPHONE (OUTPATIENT)
Dept: FAMILY MEDICINE | Facility: CLINIC | Age: 33
End: 2018-03-16

## 2018-03-16 NOTE — TELEPHONE ENCOUNTER
----- Message from Humphrey Aldridge sent at 3/16/2018  9:24 AM CDT -----  Contact: Pt   Pt returned a phone call..[phone] (mtzy)

## 2018-03-19 ENCOUNTER — PATIENT MESSAGE (OUTPATIENT)
Dept: OBSTETRICS AND GYNECOLOGY | Facility: CLINIC | Age: 33
End: 2018-03-19

## 2018-06-02 ENCOUNTER — PATIENT MESSAGE (OUTPATIENT)
Dept: OBSTETRICS AND GYNECOLOGY | Facility: CLINIC | Age: 33
End: 2018-06-02

## 2018-06-04 ENCOUNTER — HOSPITAL ENCOUNTER (EMERGENCY)
Facility: HOSPITAL | Age: 33
Discharge: HOME | End: 2018-06-04
Payer: COMMERCIAL

## 2018-06-04 VITALS
SYSTOLIC BLOOD PRESSURE: 124 MMHG | OXYGEN SATURATION: 99 % | RESPIRATION RATE: 17 BRPM | TEMPERATURE: 98 F | DIASTOLIC BLOOD PRESSURE: 68 MMHG | HEIGHT: 63 IN | WEIGHT: 219.81 LBS | BODY MASS INDEX: 38.95 KG/M2 | HEART RATE: 79 BPM

## 2018-06-04 DIAGNOSIS — R10.2 PELVIC PAIN IN FEMALE: Primary | ICD-10-CM

## 2018-06-04 DIAGNOSIS — E66.9 OBESITY, UNSPECIFIED CLASSIFICATION, UNSPECIFIED OBESITY TYPE, UNSPECIFIED WHETHER SERIOUS COMORBIDITY PRESENT: ICD-10-CM

## 2018-06-04 DIAGNOSIS — N94.6 PAINFUL MENSTRUATION: ICD-10-CM

## 2018-06-04 PROCEDURE — 51702 INSERT TEMP BLADDER CATH: CPT

## 2018-06-04 PROCEDURE — 25000003 PHARM REV CODE 250: Performed by: NURSE PRACTITIONER

## 2018-06-04 PROCEDURE — 99284 EMERGENCY DEPT VISIT MOD MDM: CPT | Mod: 25

## 2018-06-04 RX ORDER — DICLOFENAC SODIUM 50 MG/1
50 TABLET, DELAYED RELEASE ORAL 3 TIMES DAILY PRN
Qty: 20 TABLET | Refills: 0 | Status: SHIPPED | OUTPATIENT
Start: 2018-06-04 | End: 2018-09-21

## 2018-06-04 RX ORDER — KETOROLAC TROMETHAMINE 10 MG/1
10 TABLET, FILM COATED ORAL
Status: COMPLETED | OUTPATIENT
Start: 2018-06-04 | End: 2018-06-04

## 2018-06-04 RX ADMIN — KETOROLAC TROMETHAMINE 10 MG: 10 TABLET, FILM COATED ORAL at 07:06

## 2018-06-04 NOTE — ED PROVIDER NOTES
SCRIBE #1 NOTE: I, Hany Estrada, am scribing for, and in the presence of, Blade Mckeon NP. I have scribed the entire note.      History      Chief Complaint   Patient presents with    Abdominal Pain     RLQ, off and on since December.  States worse around the time of her cycle.       Review of patient's allergies indicates:  No Known Allergies     HPI   HPI    2018, 6:51 PM   History obtained from the patient      History of Present Illness: Ralph Hughes is a 32 y.o. female patient who presents to the Emergency Department for RLQ ABD pain which onset gradually months ago since having tubal ligation in 2017. Sxs are intermittent and moderate in severity. Pt reports pain begins at start of her menstrual cycle. Pt started menstrual cycle 2 days ago. Pt was at Dr. Keene' (OBGYN) clinic earlier today but had to leave to pickup her kids. There are no mitigating or exacerbating factors noted. Pt requesting US. Pt denies any fever, N/V/D, dysuria, hematuria, constipation, blood in stool, vaginal discharge, vaginal pain, and all other sxs at this time. No further complaints or concerns at this time.       Arrival mode: Personal vehicle      PCP: LIV Larson Jr, MD       Past Medical History:  Past Medical History:   Diagnosis Date    Herpes simplex virus (HSV) infection        Past Surgical History:  Past Surgical History:   Procedure Laterality Date     SECTION      x3    Ectopic Pregnacy Removal  10/13/2016    TUBAL LIGATION      bilateral salpingectomy         Family History:  Family History   Problem Relation Age of Onset    Hypertension Mother     Cancer Mother     Breast cancer Neg Hx     Colon cancer Neg Hx     Ovarian cancer Neg Hx     Thrombophilia Neg Hx        Social History:  Social History     Social History Main Topics    Smoking status: Never Smoker    Smokeless tobacco: Never Used    Alcohol use Yes      Comment: occasionally    Drug use: No     Sexual activity: Yes     Partners: Male     Birth control/ protection: Surgical       ROS   Review of Systems   Constitutional: Negative for fever.   HENT: Negative for sore throat.    Respiratory: Negative for shortness of breath.    Cardiovascular: Negative for chest pain.   Gastrointestinal: Positive for abdominal pain. Negative for diarrhea, nausea and vomiting.   Genitourinary: Negative for dysuria.   Musculoskeletal: Negative for back pain.   Skin: Negative for rash.   Neurological: Negative for weakness.   Hematological: Does not bruise/bleed easily.     Physical Exam      Initial Vitals [06/04/18 1830]   BP Pulse Resp Temp SpO2   131/80 85 16 99 °F (37.2 °C) 97 %      MAP       97          Physical Exam  Nursing Notes and Vital Signs Reviewed.  Constitutional: Patient is in no acute distress. Well-developed and well-nourished.  Head: Atraumatic. Normocephalic.  Eyes: PERRL. EOM intact. Conjunctivae are not pale. No scleral icterus.  ENT: Mucous membranes are moist. Oropharynx is clear and symmetric.    Neck: Supple. Full ROM. No lymphadenopathy.  Cardiovascular: Regular rate. Regular rhythm. No murmurs, rubs, or gallops. Distal pulses are 2+ and symmetric.  Pulmonary/Chest: No respiratory distress. Clear to auscultation bilaterally. No wheezing or rales.  Abdominal: Soft and non-distended.  There is no tenderness.  No rebound, guarding, or rigidity. Good bowel sounds.  Genitourinary: No CVA tenderness  Musculoskeletal: Moves all extremities. No obvious deformities. No edema. No calf tenderness.  Skin: Warm and dry.  Neurological:  Alert, awake, and appropriate.  Normal speech.  No acute focal neurological deficits are appreciated.  Psychiatric: Normal affect. Good eye contact. Appropriate in content.    ED Course    Procedures  ED Vital Signs:  Vitals:    06/04/18 1830 06/04/18 2137   BP: 131/80 124/68   Pulse: 85 79   Resp: 16 17   Temp: 99 °F (37.2 °C) 98 °F (36.7 °C)   TempSrc: Oral Oral   SpO2: 97% 99%  "  Weight: 99.7 kg (219 lb 12.8 oz)    Height: 5' 3" (1.6 m)        Abnormal Lab Results:  Labs Reviewed - No data to display     All Lab Results:  Results for orders placed or performed in visit on 03/15/18   Hemoglobin A1c   Result Value Ref Range    Hemoglobin A1C 5.3 4.0 - 5.6 %    Estimated Avg Glucose 105 68 - 131 mg/dL   TSH   Result Value Ref Range    TSH 1.015 0.400 - 4.000 uIU/mL   Vitamin D   Result Value Ref Range    Vit D, 25-Hydroxy 34 30 - 96 ng/mL         Imaging Results:  Imaging Results          US Pelvis Comp with Transvag NON-OB (xpd) (Final result)  Result time 06/04/18 21:08:19   Procedure changed from US Pelvis Complete Non OB     Final result by Giovani Ellison MD (06/04/18 21:08:19)                 Impression:      Multiple nabothian cysts in the cervix as described above.  Exam otherwise unremarkable.    All CT scans at this facility use dose modulation, iterative reconstruction, and/or weight based dosing when appropriate to reduce radiation dose to as low as reasonably achievable.      Electronically signed by: Giovani Ellison MD  Date:    06/04/2018  Time:    21:08             Narrative:    EXAMINATION:  US PELVIS COMP WITH TRANSVAG NON-OB (XPD)    CLINICAL HISTORY:  RLQ Pelvic pain, following surgery;    FINDINGS:  The uterus measures 12.5 x 5.4 x 7.1 cm.  There are several nabothian cyst in the cervix measuring up to 1.3 cm in diameter.  The endometrial stripe is normal at 6 mm.  The right ovary measures 3.6 cm in long axis and appears normal.  Left ovary measures 2.2 cm in long axis and also appears normal.  No adnexal masses or free fluid.                                        The Emergency Provider reviewed the vital signs and test results, which are outlined above.    ED Discussion     9:28 PM: Reassessed pt. Pt states their condition has improved at this time.  Discussed with pt all pertinent ED information and results. Discussed plan of treatment with pt. Gave pt all f/u and return to " the ED instructions. All questions and concerns were addressed at this time. Pt understands and agrees to plan as discussed. Pt is stable for discharge.     I discussed with patient and/or family/caretaker that evaluation in the ED does not suggest any emergent or life threatening medical conditions requiring immediate intervention beyond what was provided in the ED, and I believe patient is safe for discharge.  Regardless, an unremarkable evaluation in the ED does not preclude the development or presence of a serious of life threatening condition. As such, patient was instructed to return immediately for any worsening or change in current symptoms.    ED Medication(s):  Medications   ketorolac tablet 10 mg (10 mg Oral Given 6/4/18 1942)       Discharge Medication List as of 6/4/2018  9:24 PM      START taking these medications    Details   diclofenac (VOLTAREN) 50 MG EC tablet Take 1 tablet (50 mg total) by mouth 3 (three) times daily as needed., Starting Mon 6/4/2018, Print             Follow-up Information     [facility] - .    Specialty:  Emergency Medicine  Why:  As needed, If symptoms worsen  Contact information:  [id number] [place] [id number] [phone]           Schedule an appointment as soon as possible for a visit  with Clay Keene MD.    Specialty:  Obstetrics and Gynecology  Contact information:  [address] [facility] [address] [phone]                     Medical Decision Making    Medical Decision Making:   Clinical Tests:   Lab Tests: Reviewed and Ordered  Radiological Study: Reviewed and Ordered           Scribe Attestation:   Scribe #1: I performed the above scribed service and the documentation accurately describes the services I performed. I attest to the accuracy of the note.    Attending:   Physician Attestation Statement for Scribe #1: I, Blade Mckeon NP, personally performed the services described in this documentation, as scribed  by Hany Estrada, in my presence, and it is both accurate and complete.          Clinical Impression       ICD-10-CM ICD-9-CM   1. Pelvic pain in female R10.2 625.9   2. Painful menstruation N94.6 625.3   3. Obesity, unspecified classification, unspecified obesity type, unspecified whether serious comorbidity present E66.9 278.00       Disposition:   Disposition: Discharged  Condition: Stable         Blade Mckeon NP  06/05/18 0120

## 2018-06-22 ENCOUNTER — OFFICE VISIT (OUTPATIENT)
Dept: OBSTETRICS AND GYNECOLOGY | Facility: CLINIC | Age: 33
End: 2018-06-22
Payer: COMMERCIAL

## 2018-06-22 VITALS
SYSTOLIC BLOOD PRESSURE: 102 MMHG | DIASTOLIC BLOOD PRESSURE: 74 MMHG | HEIGHT: 63 IN | BODY MASS INDEX: 38.28 KG/M2 | WEIGHT: 216.06 LBS

## 2018-06-22 DIAGNOSIS — Z01.419 WELL WOMAN EXAM WITH ROUTINE GYNECOLOGICAL EXAM: Primary | ICD-10-CM

## 2018-06-22 DIAGNOSIS — Z11.3 SCREEN FOR STD (SEXUALLY TRANSMITTED DISEASE): ICD-10-CM

## 2018-06-22 DIAGNOSIS — R10.32 LEFT LOWER QUADRANT PAIN: ICD-10-CM

## 2018-06-22 PROCEDURE — 87491 CHLMYD TRACH DNA AMP PROBE: CPT

## 2018-06-22 PROCEDURE — 99999 PR PBB SHADOW E&M-EST. PATIENT-LVL II: CPT | Mod: PBBFAC,,, | Performed by: OBSTETRICS & GYNECOLOGY

## 2018-06-22 PROCEDURE — 81002 URINALYSIS NONAUTO W/O SCOPE: CPT | Mod: S$GLB,,, | Performed by: OBSTETRICS & GYNECOLOGY

## 2018-06-22 PROCEDURE — 99395 PREV VISIT EST AGE 18-39: CPT | Mod: 25,S$GLB,, | Performed by: OBSTETRICS & GYNECOLOGY

## 2018-06-22 PROCEDURE — 81025 URINE PREGNANCY TEST: CPT | Mod: S$GLB,,, | Performed by: OBSTETRICS & GYNECOLOGY

## 2018-06-22 NOTE — PROGRESS NOTES
Subjective:       Patient ID: Ralph Hughes is a 33 y.o. female.    Chief Complaint:  Annual Exam      History of Present Illness  HPI  Annual Exam-Premenopausal  Patient presents for annual exam. The patient reports that she was evaluated in ER earlier this month for LLQ pains.  Work-up there was negative and pt was discharged.  Pains have continued.  Denies nausea, vomiting, diarrhea, constipation, fever, dysuria.  The patient is sexually active (BTL). GYN screening history: last pap: approximate date  and was normal. The patient wears seatbelts: yes. The patient participates in regular exercise: no. Has the patient ever been transfused or tattooed?: yes. The patient reports that there is not domestic violence in her life.  Menses are regular.  Denies excessive bleeding or cramping.        GYN & OB History  Patient's last menstrual period was 2018.   Date of Last Pap: 2017    OB History    Para Term  AB Living   4 3 2 1 1 3   SAB TAB Ectopic Multiple Live Births   0 0 1 0 3      # Outcome Date GA Lbr Vipin/2nd Weight Sex Delivery Anes PTL Lv   4 Term 17 39w1d  3.26 kg (7 lb 3 oz) M CS-LTranv Spinal  WENDI   3  06/15/11 34w0d  2.155 kg (4 lb 12 oz) F CS-LTranv EPI Y WENDI   2 Term / 40w0d  3.459 kg (7 lb 10 oz) F CS-LTranv EPI N WENDI      Complications: Fetal Intolerance   1 Ectopic                   Review of Systems  Review of Systems   Constitutional: Negative for activity change, appetite change, fatigue, fever and unexpected weight change.   Respiratory: Negative for shortness of breath.    Cardiovascular: Negative for chest pain, palpitations and leg swelling.   Gastrointestinal: Positive for abdominal pain. Negative for bloating, blood in stool, constipation, diarrhea, nausea and vomiting.   Genitourinary: Negative for dyspareunia, dysuria, flank pain, frequency, genital sores, hematuria, menorrhagia, menstrual problem, pelvic pain, vaginal bleeding, vaginal  discharge, vaginal pain, dysmenorrhea, urinary incontinence and vaginal odor.   Musculoskeletal: Negative for back pain.   Neurological: Negative for syncope and headaches.   Breast: Negative for breast mass, breast pain, nipple discharge and skin changes          Objective:    Physical Exam:   Constitutional: She is oriented to person, place, and time. She appears well-developed and well-nourished. No distress.    HENT:   Head: Normocephalic and atraumatic.    Eyes: EOM are normal. Pupils are equal, round, and reactive to light.    Neck: Normal range of motion. Neck supple.    Cardiovascular: Normal rate, regular rhythm and normal heart sounds.     Pulmonary/Chest: Effort normal and breath sounds normal.        Abdominal: Soft. Bowel sounds are normal. She exhibits no distension. There is no tenderness.     Genitourinary: Vagina normal and uterus normal. Pelvic exam was performed with patient supine. There is no rash, tenderness, lesion or injury on the right labia. There is no rash, tenderness, lesion or injury on the left labia. Uterus is not deviated, not enlarged and not tender. Cervix is normal. Right adnexum displays no mass, no tenderness and no fullness. Left adnexum displays no mass, no tenderness and no fullness. No erythema, tenderness or bleeding in the vagina. No foreign body in the vagina. No signs of injury around the vagina. No vaginal discharge found. Cervix exhibits no motion tenderness, no discharge and no friability.   Genitourinary Comments: UPT today Negative  UA today Negative           Musculoskeletal: Normal range of motion and moves all extremeties. She exhibits no edema or tenderness.       Neurological: She is alert and oriented to person, place, and time.    Skin: Skin is warm and dry.    Psychiatric: She has a normal mood and affect. Her behavior is normal. Thought content normal.          Assessment:        1. Well woman exam with routine gynecological exam    2. Left lower quadrant  pain    3. Screen for STD (sexually transmitted disease)             Plan:      Well woman exam with routine gynecological exam  -     Pt was counseled on cervical/vaginal screening guidelines and recommendations.  Last pap NILM on 2017.  As per current ASCCP guidelines, next pap is due 2020.  -     Pt was advised on current breast cancer screening recommendations.    -     Follow up with PCP for routine health maintenance needs.    Left lower quadrant pain  -     POCT urine pregnancy  -     POCT urine dipstick without microscope  -     Clinical presentation is non-specific and exam today was unremarkable.  Evaluation to date negative for Gyn source.  Recommend continued monitoring and follow up with PCP if symptoms worsen or fail to improve.    Screen for STD (sexually transmitted disease)  -     C. trachomatis/N. gonorrhoeae by AMP DNA Cervix      Follow-up in about 1 year (around 6/22/2019).

## 2018-06-23 LAB
C TRACH DNA SPEC QL NAA+PROBE: NOT DETECTED
N GONORRHOEA DNA SPEC QL NAA+PROBE: NOT DETECTED

## 2018-06-29 ENCOUNTER — PATIENT MESSAGE (OUTPATIENT)
Dept: FAMILY MEDICINE | Facility: CLINIC | Age: 33
End: 2018-06-29

## 2018-06-29 ENCOUNTER — PATIENT MESSAGE (OUTPATIENT)
Dept: OBSTETRICS AND GYNECOLOGY | Facility: CLINIC | Age: 33
End: 2018-06-29

## 2018-09-21 ENCOUNTER — PATIENT MESSAGE (OUTPATIENT)
Dept: OBSTETRICS AND GYNECOLOGY | Facility: CLINIC | Age: 33
End: 2018-09-21

## 2018-09-21 ENCOUNTER — OFFICE VISIT (OUTPATIENT)
Dept: OBSTETRICS AND GYNECOLOGY | Facility: CLINIC | Age: 33
End: 2018-09-21
Payer: MEDICAID

## 2018-09-21 VITALS
WEIGHT: 226.88 LBS | DIASTOLIC BLOOD PRESSURE: 74 MMHG | BODY MASS INDEX: 40.19 KG/M2 | SYSTOLIC BLOOD PRESSURE: 122 MMHG

## 2018-09-21 DIAGNOSIS — N94.6 DYSMENORRHEA: Primary | ICD-10-CM

## 2018-09-21 PROCEDURE — 99999 PR PBB SHADOW E&M-EST. PATIENT-LVL III: CPT | Mod: PBBFAC,,, | Performed by: OBSTETRICS & GYNECOLOGY

## 2018-09-21 PROCEDURE — 81002 URINALYSIS NONAUTO W/O SCOPE: CPT | Mod: PBBFAC,PO | Performed by: OBSTETRICS & GYNECOLOGY

## 2018-09-21 PROCEDURE — 81025 URINE PREGNANCY TEST: CPT | Mod: PBBFAC,PO | Performed by: OBSTETRICS & GYNECOLOGY

## 2018-09-21 PROCEDURE — 99213 OFFICE O/P EST LOW 20 MIN: CPT | Mod: PBBFAC,PO | Performed by: OBSTETRICS & GYNECOLOGY

## 2018-09-21 PROCEDURE — 99213 OFFICE O/P EST LOW 20 MIN: CPT | Mod: S$PBB,,, | Performed by: OBSTETRICS & GYNECOLOGY

## 2018-09-21 RX ORDER — LEVONORGESTREL / ETHINYL ESTRADIOL AND ETHINYL ESTRADIOL 150-30(84)
1 KIT ORAL DAILY
Qty: 84 EACH | Refills: 3 | Status: SHIPPED | OUTPATIENT
Start: 2018-09-21 | End: 2018-10-03

## 2018-09-21 RX ORDER — HYDROCODONE BITARTRATE AND ACETAMINOPHEN 5; 325 MG/1; MG/1
1 TABLET ORAL EVERY 4 HOURS PRN
Qty: 10 TABLET | Refills: 0 | Status: SHIPPED | OUTPATIENT
Start: 2018-09-21 | End: 2019-03-12

## 2018-09-21 RX ORDER — NAPROXEN SODIUM 550 MG/1
TABLET ORAL
Qty: 30 TABLET | Refills: 3 | Status: SHIPPED | OUTPATIENT
Start: 2018-09-21 | End: 2019-05-21 | Stop reason: SDUPTHER

## 2018-09-21 NOTE — PROGRESS NOTES
Subjective:       Patient ID: Ralph Hughes is a 33 y.o. female.    Chief Complaint:  Abdominal Pain      History of Present Illness  HPI  Pt reports complaints of intense lower abdominal cramping due to her period.  Pains began 1-2 days ago and menses started today. Pt reports that she normally experiences intense cramping with each menses.  Menses are regular and periods last 5-6 days.  Denies excessive bleeding.  Pt is not sexually active (maria alejandra has been out of town for the past 3 months).  Pt has used OTC NSAIDs with limited improvement.  Pt had to leave work today due to cramping.  Pt has been evaluated for similar pains in the past and work-up then has been negative.    GYN & OB History  Patient's last menstrual period was 2018 (exact date).   Date of Last Pap: 2017    OB History    Para Term  AB Living   4 3 2 1 1 3   SAB TAB Ectopic Multiple Live Births   0 0 1 0 3      # Outcome Date GA Lbr Vipin/2nd Weight Sex Delivery Anes PTL Lv   4 Term 17 39w1d  3.26 kg (7 lb 3 oz) M CS-LTranv Spinal  WENDI   3  06/15/11 34w0d  2.155 kg (4 lb 12 oz) F CS-LTranv EPI Y WENDI   2 Term 09 40w0d  3.459 kg (7 lb 10 oz) F CS-LTranv EPI N WENDI      Complications: Fetal Intolerance   1 Ectopic                   Review of Systems  Review of Systems   Constitutional: Negative for activity change, appetite change, fatigue, fever and unexpected weight change.   Respiratory: Negative for shortness of breath.    Cardiovascular: Negative for chest pain, palpitations and leg swelling.   Gastrointestinal: Positive for abdominal pain. Negative for bloating, blood in stool, constipation, diarrhea, nausea and vomiting.   Genitourinary: Positive for pelvic pain, vaginal bleeding and dysmenorrhea. Negative for dysuria, flank pain, frequency, genital sores, hematuria, menorrhagia, menstrual problem, vaginal discharge, vaginal pain, urinary incontinence and vaginal odor.   Musculoskeletal:  Negative for back pain.   Neurological: Negative for syncope and headaches.           Objective:    Physical Exam:   Constitutional: She is oriented to person, place, and time. She appears well-developed and well-nourished. She appears distressed (mild).       Cardiovascular: Normal rate, regular rhythm and normal heart sounds.     Pulmonary/Chest: Effort normal and breath sounds normal.        Abdominal: Soft. Bowel sounds are normal. She exhibits no distension and no mass. There is no tenderness. There is no rebound, no guarding, no tenderness at McBurney's point and negative Dominguez's sign. No hernia.     Genitourinary: Uterus normal. Pelvic exam was performed with patient supine. There is no rash, tenderness, lesion or injury on the right labia. There is no rash, tenderness, lesion or injury on the left labia. Uterus is not deviated, not enlarged and not tender. Cervix is normal. Right adnexum displays tenderness. Right adnexum displays no mass and no fullness. Left adnexum displays tenderness. Left adnexum displays no mass and no fullness. There is bleeding (moderate amount of old blood in vault) in the vagina. No erythema or tenderness in the vagina. No foreign body in the vagina. No signs of injury around the vagina. No vaginal discharge found. Cervix exhibits no motion tenderness, no discharge and no friability.   Genitourinary Comments: UPT today Negative  UA today: 1 + blood; negative nitrites           Musculoskeletal: Normal range of motion and moves all extremeties. She exhibits no edema or tenderness.       Neurological: She is alert and oriented to person, place, and time.    Skin: Skin is warm and dry.    Psychiatric: She has a normal mood and affect. Her behavior is normal. Thought content normal.          Assessment:        1. Dysmenorrhea             Plan:      Dysmenorrhea  -     POCT urine pregnancy  -     POCT URINE DIPSTICK WITHOUT MICROSCOPE  -     US Pelvis Comp with Transvag NON-OB (xpd;  Future; Expected date: 09/21/2018  -     naproxen sodium (ANAPROX) 550 MG tablet; Take one tablet by mouth twice daily with meals for 5 days with each menses.  Start taking 2 days prior to each period.  Dispense: 30 tablet; Refill: 3  -     HYDROcodone-acetaminophen (NORCO) 5-325 mg per tablet; Take 1 tablet by mouth every 4 (four) hours as needed for Pain.  Dispense: 10 tablet; Refill: 0  -     L norgest/e.estradiol-e.estrad (SEASONIQUE) 0.15 mg-30 mcg (84)/10 mcg (7) 3MPk; Take 1 tablet by mouth once daily.  Dispense: 84 each; Refill: 3  -     Clinical presentation of pain today is consistent with dysmenorrhea.  Pt was counseled on treatment options, including associated risks and benefits of each.  Pt voiced understanding and desires to proceed with continuous OCP and Rx Anaprox.  Medication dosing, side-effects, risks, benefits, and alternatives were discussed.  Medical history was reviewed and pt is a candidate for OCP use.  Will supplement with single Rx for Norco today.      Follow-up in about 3 months (around 12/21/2018).

## 2018-09-24 ENCOUNTER — PATIENT MESSAGE (OUTPATIENT)
Dept: OBSTETRICS AND GYNECOLOGY | Facility: CLINIC | Age: 33
End: 2018-09-24

## 2018-09-26 ENCOUNTER — TELEPHONE (OUTPATIENT)
Dept: RADIOLOGY | Facility: HOSPITAL | Age: 33
End: 2018-09-26

## 2018-09-28 ENCOUNTER — PATIENT MESSAGE (OUTPATIENT)
Dept: OBSTETRICS AND GYNECOLOGY | Facility: CLINIC | Age: 33
End: 2018-09-28

## 2018-10-01 ENCOUNTER — PATIENT MESSAGE (OUTPATIENT)
Dept: OBSTETRICS AND GYNECOLOGY | Facility: CLINIC | Age: 33
End: 2018-10-01

## 2018-10-01 DIAGNOSIS — N94.6 DYSMENORRHEA: Primary | ICD-10-CM

## 2018-10-03 ENCOUNTER — TELEPHONE (OUTPATIENT)
Dept: RADIOLOGY | Facility: HOSPITAL | Age: 33
End: 2018-10-03

## 2018-10-03 RX ORDER — NORETHINDRONE ACETATE AND ETHINYL ESTRADIOL .03; 1.5 MG/1; MG/1
1 TABLET ORAL DAILY
Qty: 84 EACH | Refills: 3 | Status: SHIPPED | OUTPATIENT
Start: 2018-10-03 | End: 2019-03-12

## 2018-10-04 ENCOUNTER — HOSPITAL ENCOUNTER (OUTPATIENT)
Dept: RADIOLOGY | Facility: HOSPITAL | Age: 33
Discharge: HOME | End: 2018-10-04
Attending: OBSTETRICS & GYNECOLOGY
Payer: MEDICAID

## 2018-10-04 DIAGNOSIS — N94.6 DYSMENORRHEA: ICD-10-CM

## 2018-10-04 PROCEDURE — 76856 US EXAM PELVIC COMPLETE: CPT | Mod: TC,PO

## 2018-10-04 PROCEDURE — 76856 US EXAM PELVIC COMPLETE: CPT | Mod: 26,,, | Performed by: RADIOLOGY

## 2018-10-04 PROCEDURE — 76830 TRANSVAGINAL US NON-OB: CPT | Mod: 26,,, | Performed by: RADIOLOGY

## 2018-10-04 PROCEDURE — 76830 TRANSVAGINAL US NON-OB: CPT | Mod: TC,PO

## 2018-10-19 ENCOUNTER — OFFICE VISIT (OUTPATIENT)
Dept: FAMILY MEDICINE | Facility: CLINIC | Age: 33
End: 2018-10-19
Payer: MEDICAID

## 2018-10-19 ENCOUNTER — LAB VISIT (OUTPATIENT)
Dept: LAB | Facility: HOSPITAL | Age: 33
End: 2018-10-19
Payer: MEDICAID

## 2018-10-19 ENCOUNTER — TELEPHONE (OUTPATIENT)
Dept: FAMILY MEDICINE | Facility: CLINIC | Age: 33
End: 2018-10-19

## 2018-10-19 VITALS
OXYGEN SATURATION: 98 % | DIASTOLIC BLOOD PRESSURE: 78 MMHG | BODY MASS INDEX: 40.79 KG/M2 | HEIGHT: 63 IN | HEART RATE: 93 BPM | TEMPERATURE: 99 F | WEIGHT: 230.19 LBS | RESPIRATION RATE: 18 BRPM | SYSTOLIC BLOOD PRESSURE: 132 MMHG

## 2018-10-19 DIAGNOSIS — J06.9 VIRAL UPPER RESPIRATORY TRACT INFECTION: Primary | ICD-10-CM

## 2018-10-19 DIAGNOSIS — E55.9 VITAMIN D DEFICIENCY: ICD-10-CM

## 2018-10-19 DIAGNOSIS — J45.909 ASTHMA IN ADULT WITHOUT COMPLICATION, UNSPECIFIED ASTHMA SEVERITY, UNSPECIFIED WHETHER PERSISTENT: ICD-10-CM

## 2018-10-19 DIAGNOSIS — R05.9 COUGH: ICD-10-CM

## 2018-10-19 LAB
25(OH)D3+25(OH)D2 SERPL-MCNC: 33 NG/ML
ALBUMIN SERPL BCP-MCNC: 3.6 G/DL
ALP SERPL-CCNC: 79 U/L
ALT SERPL W/O P-5'-P-CCNC: 11 U/L
ANION GAP SERPL CALC-SCNC: 6 MMOL/L
AST SERPL-CCNC: 15 U/L
BASOPHILS # BLD AUTO: 0.01 K/UL
BASOPHILS NFR BLD: 0.2 %
BILIRUB SERPL-MCNC: 0.3 MG/DL
BUN SERPL-MCNC: 15 MG/DL
CALCIUM SERPL-MCNC: 9.1 MG/DL
CHLORIDE SERPL-SCNC: 104 MMOL/L
CHOLEST SERPL-MCNC: 145 MG/DL
CHOLEST/HDLC SERPL: 3.8 {RATIO}
CO2 SERPL-SCNC: 26 MMOL/L
CREAT SERPL-MCNC: 0.8 MG/DL
DIFFERENTIAL METHOD: ABNORMAL
EOSINOPHIL # BLD AUTO: 0.1 K/UL
EOSINOPHIL NFR BLD: 1.1 %
ERYTHROCYTE [DISTWIDTH] IN BLOOD BY AUTOMATED COUNT: 13.6 %
EST. GFR  (AFRICAN AMERICAN): >60 ML/MIN/1.73 M^2
EST. GFR  (NON AFRICAN AMERICAN): >60 ML/MIN/1.73 M^2
ESTIMATED AVG GLUCOSE: 114 MG/DL
GLUCOSE SERPL-MCNC: 72 MG/DL
HBA1C MFR BLD HPLC: 5.6 %
HCT VFR BLD AUTO: 35.5 %
HDLC SERPL-MCNC: 38 MG/DL
HDLC SERPL: 26.2 %
HGB BLD-MCNC: 11.7 G/DL
IMM GRANULOCYTES # BLD AUTO: 0.02 K/UL
IMM GRANULOCYTES NFR BLD AUTO: 0.4 %
LDLC SERPL CALC-MCNC: 93.4 MG/DL
LYMPHOCYTES # BLD AUTO: 2.2 K/UL
LYMPHOCYTES NFR BLD: 40.2 %
MCH RBC QN AUTO: 28.7 PG
MCHC RBC AUTO-ENTMCNC: 33 G/DL
MCV RBC AUTO: 87 FL
MONOCYTES # BLD AUTO: 0.7 K/UL
MONOCYTES NFR BLD: 12 %
NEUTROPHILS # BLD AUTO: 2.6 K/UL
NEUTROPHILS NFR BLD: 46.1 %
NONHDLC SERPL-MCNC: 107 MG/DL
NRBC BLD-RTO: 0 /100 WBC
PLATELET # BLD AUTO: 205 K/UL
PMV BLD AUTO: 13.4 FL
POTASSIUM SERPL-SCNC: 3.7 MMOL/L
PROT SERPL-MCNC: 7.6 G/DL
RBC # BLD AUTO: 4.07 M/UL
SODIUM SERPL-SCNC: 136 MMOL/L
TRIGL SERPL-MCNC: 68 MG/DL
TSH SERPL DL<=0.005 MIU/L-ACNC: 0.72 UIU/ML
WBC # BLD AUTO: 5.57 K/UL

## 2018-10-19 PROCEDURE — 85025 COMPLETE CBC W/AUTO DIFF WBC: CPT

## 2018-10-19 PROCEDURE — 36415 COLL VENOUS BLD VENIPUNCTURE: CPT | Mod: PO

## 2018-10-19 PROCEDURE — 99214 OFFICE O/P EST MOD 30 MIN: CPT | Mod: S$PBB,,, | Performed by: FAMILY MEDICINE

## 2018-10-19 PROCEDURE — 99214 OFFICE O/P EST MOD 30 MIN: CPT | Mod: PBBFAC,PO | Performed by: FAMILY MEDICINE

## 2018-10-19 PROCEDURE — 80061 LIPID PANEL: CPT

## 2018-10-19 PROCEDURE — 99999 PR PBB SHADOW E&M-EST. PATIENT-LVL IV: CPT | Mod: PBBFAC,,, | Performed by: FAMILY MEDICINE

## 2018-10-19 PROCEDURE — 80053 COMPREHEN METABOLIC PANEL: CPT

## 2018-10-19 PROCEDURE — 83036 HEMOGLOBIN GLYCOSYLATED A1C: CPT

## 2018-10-19 PROCEDURE — 84443 ASSAY THYROID STIM HORMONE: CPT

## 2018-10-19 PROCEDURE — 82306 VITAMIN D 25 HYDROXY: CPT

## 2018-10-19 RX ORDER — ALBUTEROL SULFATE 90 UG/1
2 AEROSOL, METERED RESPIRATORY (INHALATION) EVERY 6 HOURS PRN
Qty: 18 G | Refills: 0 | Status: SHIPPED | OUTPATIENT
Start: 2018-10-19 | End: 2019-06-14 | Stop reason: SDUPTHER

## 2018-10-19 RX ORDER — BENZONATATE 100 MG/1
100 CAPSULE ORAL 3 TIMES DAILY PRN
Qty: 30 CAPSULE | Refills: 0 | Status: SHIPPED | OUTPATIENT
Start: 2018-10-19 | End: 2018-10-29

## 2018-10-19 RX ORDER — IPRATROPIUM BROMIDE 42 UG/1
2 SPRAY, METERED NASAL 3 TIMES DAILY
Qty: 15 ML | Refills: 2 | Status: SHIPPED | OUTPATIENT
Start: 2018-10-19 | End: 2018-11-18

## 2018-10-19 NOTE — TELEPHONE ENCOUNTER
----- Message from Angela Mora sent at 10/19/2018  8:15 AM CDT -----  Contact: pt  Please call pt @ [phone] regarding an worked in appt to come in clinic today, pt is have congestion,sinus,cold issues.

## 2018-10-22 NOTE — PROGRESS NOTES
Subjective:      Patient ID: Ralph Hughes is a 33 y.o. female.    Chief Complaint: Sinus Problem      Past Medical History:   Diagnosis Date    Herpes simplex virus (HSV) infection      Past Surgical History:   Procedure Laterality Date     SECTION      x3    DELIVERY- SECTION WITH TUBAL N/A 2017    Performed by Lorna Pang [facility] at [facility]    Ectopic Pregnacy Removal  10/13/2016    [id number] N/A 10/13/2016    Performed by [facility] at [facility]    REMOVAL-ECTOPIC-LAPAROSCOPIC N/A 10/13/2016    Performed by [facility] at [facility]    SALPINGECTOMY-LAPAROSCOPIC Left 10/13/2016    Performed by [facility] at [facility]    TUBAL LIGATION      bilateral salpingectomy     Family History   Problem Relation Age of Onset    Hypertension Mother     Cancer Mother     Breast cancer Neg Hx     Colon cancer Neg Hx     Ovarian cancer Neg Hx     Thrombophilia Neg Hx      Social History     Socioeconomic History    Marital status: Legally      Spouse name: Not on file    Number of children: Not on file    Years of education: Not on file    Highest education level: Not on file   Social Needs    Financial resource strain: Not on file    Food insecurity - worry: Not on file    Food insecurity - inability: Not on file    Transportation needs - medical: Not on file    Transportation needs - non-medical: Not on file   Occupational History     Employer: [employer]   Tobacco Use    Smoking status: Never Smoker    Smokeless tobacco: Never Used   Substance and Sexual Activity    Alcohol use: Yes     Comment: occasionally    Drug use: No    Sexual activity: Yes     Partners: Male     Birth control/protection: Surgical   Other Topics Concern    Not on file   Social History Narrative    She works at [employer] as a .       Current Outpatient Medications:     cholecalciferol, vitamin D3, 50,000 unit  capsule, Take 1 capsule (50,000 Units total) by mouth once a week., Disp: 4 capsule, Rfl: 6    HYDROcodone-acetaminophen (NORCO) 5-325 mg per tablet, Take 1 tablet by mouth every 4 (four) hours as needed for Pain., Disp: 10 tablet, Rfl: 0    naproxen sodium (ANAPROX) 550 MG tablet, Take one tablet by mouth twice daily with meals for 5 days with each menses.  Start taking 2 days prior to each period., Disp: 30 tablet, Rfl: 3    albuterol (VENTOLIN HFA) 90 mcg/actuation inhaler, Inhale 2 puffs into the lungs every 6 (six) hours as needed for Wheezing. Rescue, Disp: 18 g, Rfl: 0    benzonatate (TESSALON) 100 MG capsule, Take 1 capsule (100 mg total) by mouth 3 (three) times daily as needed for Cough., Disp: 30 capsule, Rfl: 0    ipratropium (ATROVENT) 42 mcg (0.06 %) nasal spray, 2 sprays by Nasal route 3 (three) times daily., Disp: 15 mL, Rfl: 2    norethindrone ac-eth estradiol (JUNEL 1.5/30, 21,) 1.5-30 mg-mcg Tab, Take 1 tablet by mouth once daily. Skip inactive pills in the first two packs and allow period at the end of every third pack., Disp: 84 each, Rfl: 3  Review of patient's allergies indicates:  No Known Allergies    Review of Systems   Constitutional: Negative for chills, fever and weight loss.   HENT: Positive for rhinorrhea. Negative for ear pain, postnasal drip and sore throat.    Respiratory: Positive for cough. Negative for hemoptysis, shortness of breath and wheezing.    Cardiovascular: Negative for chest pain.   Gastrointestinal: Negative for heartburn.   Musculoskeletal: Negative for myalgias.   Skin: Negative for rash.   Allergic/Immunologic: Negative for environmental allergies.   Neurological: Negative for headaches.     Cough   This is a new problem. The current episode started in the past 7 days. The problem has been waxing and waning. The problem occurs every few minutes. The cough is productive of sputum. Associated symptoms include nasal congestion and rhinorrhea. Pertinent negatives  "include no chest pain, chills, ear congestion, ear pain, fever, headaches, heartburn, hemoptysis, myalgias, postnasal drip, rash, sore throat, shortness of breath, sweats, weight loss or wheezing. The symptoms are aggravated by fumes. She has tried OTC cough suppressant for the symptoms. The treatment provided mild relief. Her past medical history is significant for asthma. There is no history of bronchiectasis, bronchitis, COPD, emphysema, environmental allergies or pneumonia.     Cough and low grade drainage.  No  Sinus pain, no fevers or chills. Feeling better except cough.  H/o asthma - needs refill on inahler.    Objective:   /78 (BP Location: Right arm, Patient Position: Sitting, BP Method: Medium (Manual))   Pulse 93   Temp 98.8 °F (37.1 °C) (Oral)   Resp 18   Ht 5' 3" (1.6 m)   Wt 104.4 kg (230 lb 2.6 oz)   LMP 09/21/2018 (Exact Date)   SpO2 98%   BMI 40.77 kg/m²      Physical Exam   Constitutional: She is oriented to person, place, and time. She is cooperative. No distress.   HENT:   Right Ear: Hearing, tympanic membrane, external ear and ear canal normal.   Left Ear: Hearing, tympanic membrane, external ear and ear canal normal.   Mouth/Throat: Uvula is midline, oropharynx is clear and moist and mucous membranes are normal.   Eyes: Conjunctivae and EOM are normal.   Cardiovascular: Normal rate, regular rhythm and normal heart sounds.   Pulmonary/Chest: Effort normal and breath sounds normal.   Musculoskeletal: She exhibits no edema.   Neurological: She is alert and oriented to person, place, and time. Coordination and gait normal.   Skin: Skin is warm, dry and intact. No rash noted. She is not diaphoretic. No erythema.   Psychiatric: She has a normal mood and affect. Her speech is normal and behavior is normal.   Nursing note and vitals reviewed.          Assessment:       1. Viral upper respiratory tract infection    2. Asthma in adult without complication, unspecified asthma severity, " unspecified whether persistent    3. Cough    4. Vitamin D deficiency            Plan:         Viral upper respiratory tract infection  Comments:  Atrovent nasal spray prn.  Refill on albuterol - only needs occasionally.  Tessalon perles prn cough.  Hoarseness should improve in a few days.  Rest voice.    Asthma in adult without complication, unspecified asthma severity, unspecified whether persistent    Cough    Vitamin D deficiency  Comments:  Requests recheck of labs.  Orders:  -     TSH; Future; Expected date: 10/19/2018  -     Vitamin D; Future; Expected date: 10/19/2018  -     Lipid panel; Future; Expected date: 10/19/2018  -     Hemoglobin A1c; Future; Expected date: 10/19/2018  -     Comprehensive metabolic panel; Future; Expected date: 10/19/2018  -     CBC auto differential; Future; Expected date: 10/19/2018    Other orders  -     albuterol (VENTOLIN HFA) 90 mcg/actuation inhaler; Inhale 2 puffs into the lungs every 6 (six) hours as needed for Wheezing. Rescue  Dispense: 18 g; Refill: 0  -     benzonatate (TESSALON) 100 MG capsule; Take 1 capsule (100 mg total) by mouth 3 (three) times daily as needed for Cough.  Dispense: 30 capsule; Refill: 0  -     ipratropium (ATROVENT) 42 mcg (0.06 %) nasal spray; 2 sprays by Nasal route 3 (three) times daily.  Dispense: 15 mL; Refill: 2        Patient Care Team:  COLEMAN Larson Jr., MD as PCP - General (Pediatrics)    Follow-up if symptoms worsen or fail to improve.

## 2018-10-29 ENCOUNTER — PATIENT MESSAGE (OUTPATIENT)
Dept: OBSTETRICS AND GYNECOLOGY | Facility: CLINIC | Age: 33
End: 2018-10-29

## 2018-12-20 ENCOUNTER — PATIENT MESSAGE (OUTPATIENT)
Dept: FAMILY MEDICINE | Facility: CLINIC | Age: 33
End: 2018-12-20

## 2018-12-20 RX ORDER — ONDANSETRON 4 MG/1
4 TABLET, ORALLY DISINTEGRATING ORAL 3 TIMES DAILY PRN
Qty: 30 TABLET | Refills: 0 | Status: SHIPPED | OUTPATIENT
Start: 2018-12-20 | End: 2019-03-12

## 2018-12-24 ENCOUNTER — PATIENT MESSAGE (OUTPATIENT)
Dept: OBSTETRICS AND GYNECOLOGY | Facility: CLINIC | Age: 33
End: 2018-12-24

## 2018-12-26 ENCOUNTER — PATIENT MESSAGE (OUTPATIENT)
Dept: FAMILY MEDICINE | Facility: CLINIC | Age: 33
End: 2018-12-26

## 2018-12-27 ENCOUNTER — TELEPHONE (OUTPATIENT)
Dept: FAMILY MEDICINE | Facility: CLINIC | Age: 33
End: 2018-12-27

## 2018-12-27 NOTE — TELEPHONE ENCOUNTER
----- Message from Clary Falcon sent at 12/27/2018 11:16 AM CST -----  Contact: Pt  Pt request an appointment , system will did not provide available dates pt is established Ph: [phone]

## 2019-01-08 ENCOUNTER — PATIENT MESSAGE (OUTPATIENT)
Dept: OBSTETRICS AND GYNECOLOGY | Facility: CLINIC | Age: 34
End: 2019-01-08

## 2019-01-08 ENCOUNTER — OFFICE VISIT (OUTPATIENT)
Dept: FAMILY MEDICINE | Facility: CLINIC | Age: 34
End: 2019-01-08
Payer: MEDICAID

## 2019-01-08 DIAGNOSIS — Z53.21 PATIENT LEFT WITHOUT BEING SEEN: Primary | ICD-10-CM

## 2019-01-08 PROCEDURE — 99499 NO LOS: ICD-10-PCS | Mod: S$PBB,,, | Performed by: REGISTERED NURSE

## 2019-01-08 PROCEDURE — 99499 UNLISTED E&M SERVICE: CPT | Mod: S$PBB,,, | Performed by: REGISTERED NURSE

## 2019-01-08 NOTE — PROGRESS NOTES
Pt arrived 20 min late for annual wellness exam.  Offered to work her back in schedule but she declined, will r/s at another time slot.

## 2019-03-12 ENCOUNTER — OFFICE VISIT (OUTPATIENT)
Dept: OBSTETRICS AND GYNECOLOGY | Facility: CLINIC | Age: 34
End: 2019-03-12
Payer: MEDICAID

## 2019-03-12 VITALS
SYSTOLIC BLOOD PRESSURE: 116 MMHG | DIASTOLIC BLOOD PRESSURE: 72 MMHG | BODY MASS INDEX: 41.33 KG/M2 | HEIGHT: 63 IN | WEIGHT: 233.25 LBS

## 2019-03-12 DIAGNOSIS — N89.8 VAGINAL DISCHARGE: ICD-10-CM

## 2019-03-12 DIAGNOSIS — N76.0 BV (BACTERIAL VAGINOSIS): Primary | ICD-10-CM

## 2019-03-12 DIAGNOSIS — B96.89 BV (BACTERIAL VAGINOSIS): Primary | ICD-10-CM

## 2019-03-12 PROCEDURE — 99213 OFFICE O/P EST LOW 20 MIN: CPT | Mod: S$PBB,,, | Performed by: ADVANCED PRACTICE MIDWIFE

## 2019-03-12 PROCEDURE — 99999 PR PBB SHADOW E&M-EST. PATIENT-LVL III: ICD-10-PCS | Mod: PBBFAC,,, | Performed by: ADVANCED PRACTICE MIDWIFE

## 2019-03-12 PROCEDURE — 99999 PR PBB SHADOW E&M-EST. PATIENT-LVL III: CPT | Mod: PBBFAC,,, | Performed by: ADVANCED PRACTICE MIDWIFE

## 2019-03-12 PROCEDURE — 87491 CHLMYD TRACH DNA AMP PROBE: CPT

## 2019-03-12 PROCEDURE — 87480 CANDIDA DNA DIR PROBE: CPT

## 2019-03-12 PROCEDURE — 99213 OFFICE O/P EST LOW 20 MIN: CPT | Mod: PBBFAC | Performed by: ADVANCED PRACTICE MIDWIFE

## 2019-03-12 PROCEDURE — 99213 PR OFFICE/OUTPT VISIT, EST, LEVL III, 20-29 MIN: ICD-10-PCS | Mod: S$PBB,,, | Performed by: ADVANCED PRACTICE MIDWIFE

## 2019-03-12 RX ORDER — METRONIDAZOLE 500 MG/1
500 TABLET ORAL 2 TIMES DAILY
Qty: 14 TABLET | Refills: 0 | Status: SHIPPED | OUTPATIENT
Start: 2019-03-12 | End: 2019-03-19

## 2019-03-12 NOTE — PROGRESS NOTES
"Ralph Hughes is a 33 y.o. female  presents with complaint of vaginal discharge for described as a few days before cycle and a few days after cycle every month. She reports that she doesn't noticed discharge so much as a "fishy" smell.  She denies itching.  reports odor.    Past Medical History:   Diagnosis Date    Herpes simplex virus (HSV) infection      Past Surgical History:   Procedure Laterality Date     SECTION      x3    DELIVERY- SECTION WITH TUBAL N/A 2017    [facility]    Ectopic Pregnacy Removal  10/13/2016    [id number] N/A 10/13/2016    [facility]    REMOVAL-ECTOPIC-LAPAROSCOPIC N/A 10/13/2016    [facility]    SALPINGECTOMY-LAPAROSCOPIC Left 10/13/2016    [facility]    TUBAL LIGATION      bilateral salpingectomy     Social History     Tobacco Use    Smoking status: Never Smoker    Smokeless tobacco: Never Used   Substance Use Topics    Alcohol use: Yes     Comment: occasionally    Drug use: No     Family History   Problem Relation Age of Onset    Hypertension Mother     Cancer Mother         lung    Breast cancer Neg Hx     Colon cancer Neg Hx     Ovarian cancer Neg Hx     Thrombophilia Neg Hx      OB History    Para Term  AB Living   4 3 2 1 1 3   SAB TAB Ectopic Multiple Live Births   0 0 1 0 3      # Outcome Date GA Lbr Vipin/2nd Weight Sex Delivery Anes PTL Lv   4 Term 17 39w1d  3.26 kg (7 lb 3 oz) M CS-LTranv Spinal  WENDI   3  06/15/11 34w0d  2.155 kg (4 lb 12 oz) F CS-LTranv EPI Y WENDI   2 Term 09 40w0d  3.459 kg (7 lb 10 oz) F CS-LTranv EPI N WENDI      Complications: Fetal Intolerance   1 Ectopic                   /72   Ht 5' 3" (1.6 m)   Wt 105.8 kg (233 lb 4 oz)   LMP 2019 (Exact Date)   Breastfeeding? No   BMI 41.32 kg/m²     ROS:  GENERAL: No fever, " chills, fatigability or weight loss.  VULVAR: No pain, no lesions and no itching.  VAGINAL: No relaxation, no itching, small amount discharge, no abnormal bleeding and no lesions.  ABDOMEN: No abdominal pain. Denies nausea. Denies vomiting. No diarrhea. No constipation  BREAST: Denies pain. No lumps. No discharge.  URINARY: No incontinence, no nocturia, no frequency and no dysuria.  CARDIOVASCULAR: No chest pain. No shortness of breath. No leg cramps.  NEUROLOGICAL: No headaches. No vision changes.    PHYSICAL EXAM:  VULVA: normal appearing vulva with no masses, tenderness or lesions   VAGINA: normal appearing vagina with normal color and thin, white discharge, no lesions       ASSESSMENT and PLAN:    ICD-10-CM ICD-9-CM    1. BV (bacterial vaginosis) N76.0 616.10     B96.89 041.9    2. Vaginal discharge N89.8 623.5 Vaginosis Screen by DNA Probe      C. trachomatis/N. gonorrhoeae by AMP DNA [facility]; Vagina         Patient was counseled today on vaginitis prevention including :  a. avoiding feminine products such as deoderant soaps, body wash, bubble bath, douches, scented toilet paper, deoderant tampons or pads, feminine wipes, chronic pad use, etc.  b. avoiding other vulvovaginal irritants such as long hot baths, humidity, tight, synthetic clothing, chlorine and sitting around in wet bathing suits  c. wearing cotton underwear, avoiding thong underwear and no underwear to bed  d. taking showers instead of baths and use a hair dryer on cool setting afterwards to dry  e. wearing cotton to exercise and shower immediately after exercise and change clothes  f. using polyurethane condoms without spermicide if sexually active and symptoms are triggered by intercourse    FOLLOW UP: PRN lack of improvement.

## 2019-03-13 ENCOUNTER — TELEPHONE (OUTPATIENT)
Dept: OBSTETRICS AND GYNECOLOGY | Facility: CLINIC | Age: 34
End: 2019-03-13

## 2019-03-13 LAB
BACTERIAL VAGINOSIS DNA: POSITIVE
C TRACH DNA SPEC QL NAA+PROBE: NOT DETECTED
CANDIDA GLABRATA DNA: NEGATIVE
CANDIDA KRUSEI DNA: NEGATIVE
CANDIDA RRNA VAG QL PROBE: POSITIVE
N GONORRHOEA DNA SPEC QL NAA+PROBE: NOT DETECTED
T VAGINALIS RRNA GENITAL QL PROBE: NEGATIVE

## 2019-03-13 NOTE — TELEPHONE ENCOUNTER
----- Message from Jude Olivier CNM sent at 3/13/2019  2:32 PM CDT -----  Please call pt and let her know yeast and BV. We already treated the BV. The yeast can be treated with OTC monistat. And remember the probiotics. Thank you.

## 2019-05-02 ENCOUNTER — PATIENT MESSAGE (OUTPATIENT)
Dept: OBSTETRICS AND GYNECOLOGY | Facility: CLINIC | Age: 34
End: 2019-05-02

## 2019-05-02 DIAGNOSIS — B00.9 HSV-2 (HERPES SIMPLEX VIRUS 2) INFECTION: Primary | ICD-10-CM

## 2019-05-02 RX ORDER — VALACYCLOVIR HYDROCHLORIDE 500 MG/1
500 TABLET, FILM COATED ORAL 2 TIMES DAILY
Qty: 60 TABLET | Refills: 2 | Status: SHIPPED | OUTPATIENT
Start: 2019-05-02 | End: 2020-01-18 | Stop reason: SDUPTHER

## 2019-05-02 RX ORDER — VALACYCLOVIR HYDROCHLORIDE 500 MG/1
500 TABLET, FILM COATED ORAL 3 TIMES DAILY
Qty: 15 TABLET | Refills: 2 | Status: SHIPPED | OUTPATIENT
Start: 2019-05-02 | End: 2019-05-07

## 2019-05-06 ENCOUNTER — PATIENT MESSAGE (OUTPATIENT)
Dept: FAMILY MEDICINE | Facility: CLINIC | Age: 34
End: 2019-05-06

## 2019-05-21 ENCOUNTER — PATIENT MESSAGE (OUTPATIENT)
Dept: OBSTETRICS AND GYNECOLOGY | Facility: CLINIC | Age: 34
End: 2019-05-21

## 2019-05-21 DIAGNOSIS — N94.6 DYSMENORRHEA: ICD-10-CM

## 2019-05-21 RX ORDER — NAPROXEN SODIUM 550 MG/1
TABLET ORAL
Qty: 30 TABLET | Refills: 3 | Status: SHIPPED | OUTPATIENT
Start: 2019-05-21 | End: 2020-01-16

## 2019-05-21 NOTE — TELEPHONE ENCOUNTER
Pt requesting refill on ANAPROX, pt last annual was 06/22/2018. Pharmacy up to date, medication pending. Please advise. Pt states she is 2 days away from her cycle and is starting to cramp.

## 2019-05-27 ENCOUNTER — PATIENT MESSAGE (OUTPATIENT)
Dept: FAMILY MEDICINE | Facility: CLINIC | Age: 34
End: 2019-05-27

## 2019-05-27 ENCOUNTER — TELEPHONE (OUTPATIENT)
Dept: FAMILY MEDICINE | Facility: CLINIC | Age: 34
End: 2019-05-27

## 2019-05-27 NOTE — TELEPHONE ENCOUNTER
----- Message from Garrett Rivera sent at 5/27/2019  9:43 AM CDT -----  Contact: [phone]  ..Type:  Sooner Apoointment Request    Caller is requesting a sooner appointment.  Caller declined first available appointment listed below.  Caller will not accept being placed on the waitlist and is requesting a message be sent to doctor.  Name of Caller:Laquita   When is the first available appointment?06/06  Symptoms:sore throat/ possible strep   Would the patient rather a call back or a response via [facility]? Call back or [facility]  Best Call Back Number:[phone]  Additional Information:

## 2019-06-06 ENCOUNTER — PATIENT MESSAGE (OUTPATIENT)
Dept: FAMILY MEDICINE | Facility: CLINIC | Age: 34
End: 2019-06-06

## 2019-06-14 ENCOUNTER — PATIENT MESSAGE (OUTPATIENT)
Dept: FAMILY MEDICINE | Facility: CLINIC | Age: 34
End: 2019-06-14

## 2019-06-14 DIAGNOSIS — J45.909 ASTHMA IN ADULT WITHOUT COMPLICATION, UNSPECIFIED ASTHMA SEVERITY, UNSPECIFIED WHETHER PERSISTENT: Primary | ICD-10-CM

## 2019-06-14 RX ORDER — ALBUTEROL SULFATE 90 UG/1
2 AEROSOL, METERED RESPIRATORY (INHALATION) EVERY 6 HOURS PRN
Qty: 18 G | Refills: 0 | Status: SHIPPED | OUTPATIENT
Start: 2019-06-14 | End: 2020-08-28 | Stop reason: SDUPTHER

## 2019-06-17 ENCOUNTER — PATIENT MESSAGE (OUTPATIENT)
Dept: FAMILY MEDICINE | Facility: CLINIC | Age: 34
End: 2019-06-17

## 2019-08-16 ENCOUNTER — OFFICE VISIT (OUTPATIENT)
Dept: OBSTETRICS AND GYNECOLOGY | Facility: CLINIC | Age: 34
End: 2019-08-16
Payer: COMMERCIAL

## 2019-08-16 VITALS
DIASTOLIC BLOOD PRESSURE: 88 MMHG | BODY MASS INDEX: 43.01 KG/M2 | WEIGHT: 242.75 LBS | HEIGHT: 63 IN | SYSTOLIC BLOOD PRESSURE: 146 MMHG

## 2019-08-16 DIAGNOSIS — Z01.419 PAPANICOLAOU SMEAR, AS PART OF ROUTINE GYNECOLOGICAL EXAMINATION: Primary | ICD-10-CM

## 2019-08-16 DIAGNOSIS — R45.86 MOOD SWING: ICD-10-CM

## 2019-08-16 PROBLEM — Z98.891 STATUS POST REPEAT LOW TRANSVERSE CESAREAN SECTION: Status: RESOLVED | Noted: 2017-04-28 | Resolved: 2019-08-16

## 2019-08-16 PROBLEM — J30.9 ALLERGIC RHINITIS, CAUSE UNSPECIFIED: Status: RESOLVED | Noted: 2017-01-19 | Resolved: 2019-08-16

## 2019-08-16 PROCEDURE — 99395 PREV VISIT EST AGE 18-39: CPT | Mod: S$GLB,,, | Performed by: NURSE PRACTITIONER

## 2019-08-16 PROCEDURE — 99999 PR PBB SHADOW E&M-EST. PATIENT-LVL II: ICD-10-PCS | Mod: PBBFAC,,, | Performed by: NURSE PRACTITIONER

## 2019-08-16 PROCEDURE — 99395 PR PREVENTIVE VISIT,EST,18-39: ICD-10-PCS | Mod: S$GLB,,, | Performed by: NURSE PRACTITIONER

## 2019-08-16 PROCEDURE — 99999 PR PBB SHADOW E&M-EST. PATIENT-LVL II: CPT | Mod: PBBFAC,,, | Performed by: NURSE PRACTITIONER

## 2019-08-16 PROCEDURE — 87624 HPV HI-RISK TYP POOLED RSLT: CPT

## 2019-08-16 PROCEDURE — 88175 CYTOPATH C/V AUTO FLUID REDO: CPT

## 2019-08-16 RX ORDER — SERTRALINE HYDROCHLORIDE 50 MG/1
50 TABLET, FILM COATED ORAL DAILY
Qty: 30 TABLET | Refills: 2 | Status: SHIPPED | OUTPATIENT
Start: 2019-08-16 | End: 2020-04-25

## 2019-08-16 NOTE — PROGRESS NOTES
"CC: Well woman exam    Ralph Hughes is a 34 y.o. female  presents for well woman exam.  LMP: Patient's last menstrual period was 2019..  No issues, problems, or complaints. Last pap exam was normal. Is sexually active/H/O HSV, no outbreaks. S/P Salpingectomy.. Cycles are every 26-28 days, not heavy.  Patient states that she has " increased mood swings prior to her cylce". No lance or thoughts or harming self or other.s     Past Medical History:   Diagnosis Date    Herpes simplex virus (HSV) infection      Past Surgical History:   Procedure Laterality Date     SECTION      x3    DELIVERY- SECTION WITH TUBAL N/A 2017    [facility]    Ectopic Pregnacy Removal  10/13/2016    [id number] N/A 10/13/2016    [facility] at [facility]    REMOVAL-ECTOPIC-LAPAROSCOPIC N/A 10/13/2016    [facility] at [facility]    SALPINGECTOMY-LAPAROSCOPIC Left 10/13/2016    [facility] at [facility]    TUBAL LIGATION      bilateral salpingectomy     Social History     Socioeconomic History    Marital status:      Spouse name: Not on file    Number of children: Not on file    Years of education: Not on file    Highest education level: Not on file   Occupational History     Employer: [employer]   Social Needs    Financial resource strain: Not on file    Food insecurity:     Worry: Not on file     Inability: Not on file    Transportation needs:     Medical: Not on file     Non-medical: Not on file   Tobacco Use    Smoking status: Never Smoker    Smokeless tobacco: Never Used   Substance and Sexual Activity    Alcohol use: Yes     Comment: occasionally    Drug use: No    Sexual activity: Yes     Partners: Male     Birth control/protection: Surgical   Lifestyle    Physical activity:     Days per week: Not on file     Minutes per session: Not on file    " "Stress: Not on file   Relationships    Social connections:     Talks on phone: Not on file     Gets together: Not on file     Attends Mandaeism service: Not on file     Active member of club or organization: Not on file     Attends meetings of clubs or organizations: Not on file     Relationship status: Not on file   Other Topics Concern    Not on file   Social History Narrative    She works at [employer] as a .     Family History   Problem Relation Age of Onset    Hypertension Mother     Cancer Mother         lung    Breast cancer Neg Hx     Colon cancer Neg Hx     Ovarian cancer Neg Hx     Thrombophilia Neg Hx      OB History        4    Para   3    Term   2       1    AB   1    Living   3       SAB   0    TAB   0    Ectopic   1    Multiple   0    Live Births   3                 BP (!) 146/88 (BP Location: Left arm, Patient Position: Sitting, BP Method: Medium (Manual))   Ht 5' 3" (1.6 m)   Wt 110.1 kg (242 lb 11.6 oz)   LMP 2019   BMI 43.00 kg/m²       ROS:  GENERAL: Denies weight gain or weight loss. Feeling well overall.   SKIN: Denies rash or lesions.   HEAD: Denies head injury or headache.   NODES: Denies enlarged lymph nodes.   CHEST: Denies chest pain or shortness of breath.   CARDIOVASCULAR: Denies palpitations or left sided chest pain.   ABDOMEN: No abdominal pain, constipation, diarrhea, nausea, vomiting or rectal bleeding.   URINARY: No frequency, dysuria, hematuria, or burning on urination.  REPRODUCTIVE: See HPI.   BREASTS: The patient performs breast self-examination and denies pain, lumps, or nipple discharge.   HEMATOLOGIC: No easy bruisability or excessive bleeding.   MUSCULOSKELETAL: Denies joint pain or swelling.   NEUROLOGIC: Denies syncope or weakness.   PSYCHIATRIC: HPI    PHYSICAL EXAM:  APPEARANCE: Obese female, in no acute distress.  AFFECT: WNL, alert and oriented x 3  SKIN: No acne or hirsutism  NECK: Neck symmetric without masses or " thyromegaly  NODES: No inguinal, cervical, axillary, or femoral lymph node enlargement  CHEST: Good respiratory effect  ABDOMEN: Soft.  No tenderness or masses.  No hepatosplenomegaly.  No hernias.  BREASTS: Symmetrical, no skin changes or visible lesions.  No palpable masses, nipple discharge bilaterally.  PELVIC: Normal external genitalia without lesions.  Normal hair distribution.  Adequate perineal body, normal urethral meatus.  Vagina moist and well rugated without lesions or discharge.  Cervix pink, without lesions, discharge or tenderness.  No significant cystocele or rectocele.  Bimanual exam shows uterus to be normal size, regular, mobile and nontender.  Adnexa without masses or tenderness.    EXTREMITIES: No edema.    1. Papanicolaou smear, as part of routine gynecological examination  Liquid-based pap smear, screening    HPV High Risk Genotypes, PCR     PLAN:  Pap exam  Zoloft 50 mg /daily and will f/u with PCP  Patient was counseled today on A.C.S. Pap guidelines and recommendations for yearly pelvic exams, mammograms and monthly self breast exams; to see her PCP for other health maintenance.

## 2019-08-22 LAB
HPV HR 12 DNA CVX QL NAA+PROBE: NEGATIVE
HPV16 AG SPEC QL: NEGATIVE
HPV18 DNA SPEC QL NAA+PROBE: NEGATIVE

## 2019-08-29 ENCOUNTER — PATIENT MESSAGE (OUTPATIENT)
Dept: FAMILY MEDICINE | Facility: CLINIC | Age: 34
End: 2019-08-29

## 2019-08-29 ENCOUNTER — OFFICE VISIT (OUTPATIENT)
Dept: FAMILY MEDICINE | Facility: CLINIC | Age: 34
End: 2019-08-29
Payer: COMMERCIAL

## 2019-08-29 VITALS
BODY MASS INDEX: 42.04 KG/M2 | HEART RATE: 110 BPM | OXYGEN SATURATION: 98 % | SYSTOLIC BLOOD PRESSURE: 116 MMHG | DIASTOLIC BLOOD PRESSURE: 71 MMHG | TEMPERATURE: 99 F | WEIGHT: 237.31 LBS

## 2019-08-29 DIAGNOSIS — E55.9 VITAMIN D DEFICIENCY: Primary | ICD-10-CM

## 2019-08-29 DIAGNOSIS — E66.9 OBESITY, UNSPECIFIED CLASSIFICATION, UNSPECIFIED OBESITY TYPE, UNSPECIFIED WHETHER SERIOUS COMORBIDITY PRESENT: ICD-10-CM

## 2019-08-29 DIAGNOSIS — Z00.00 ROUTINE ADULT HEALTH MAINTENANCE: Primary | ICD-10-CM

## 2019-08-29 PROCEDURE — 99999 PR PBB SHADOW E&M-EST. PATIENT-LVL IV: CPT | Mod: PBBFAC,,, | Performed by: INTERNAL MEDICINE

## 2019-08-29 PROCEDURE — 99395 PREV VISIT EST AGE 18-39: CPT | Mod: S$GLB,,, | Performed by: INTERNAL MEDICINE

## 2019-08-29 PROCEDURE — 99395 PR PREVENTIVE VISIT,EST,18-39: ICD-10-PCS | Mod: S$GLB,,, | Performed by: INTERNAL MEDICINE

## 2019-08-29 PROCEDURE — 99999 PR PBB SHADOW E&M-EST. PATIENT-LVL IV: ICD-10-PCS | Mod: PBBFAC,,, | Performed by: INTERNAL MEDICINE

## 2019-08-30 ENCOUNTER — LAB VISIT (OUTPATIENT)
Dept: LAB | Facility: HOSPITAL | Age: 34
End: 2019-08-30
Attending: REGISTERED NURSE
Payer: COMMERCIAL

## 2019-08-30 ENCOUNTER — PATIENT MESSAGE (OUTPATIENT)
Dept: FAMILY MEDICINE | Facility: CLINIC | Age: 34
End: 2019-08-30

## 2019-08-30 DIAGNOSIS — E55.9 VITAMIN D DEFICIENCY: ICD-10-CM

## 2019-08-30 LAB — 25(OH)D3+25(OH)D2 SERPL-MCNC: 36 NG/ML (ref 30–96)

## 2019-08-30 PROCEDURE — 36415 COLL VENOUS BLD VENIPUNCTURE: CPT | Mod: PO

## 2019-08-30 PROCEDURE — 82306 VITAMIN D 25 HYDROXY: CPT

## 2019-08-30 NOTE — PROGRESS NOTES
Subjective:       Patient ID: Ralph Hughes is a 34 y.o. female.    Chief Complaint: Annual Exam    HPI  Past Medical History:   Diagnosis Date    Herpes simplex virus (HSV) infection      Past Surgical History:   Procedure Laterality Date     SECTION      x3    DELIVERY- SECTION WITH TUBAL N/A 2017    Performed by Lorna Pang [facility] at [facility] L&D    Ectopic Pregnacy Removal  10/13/2016    DLEAO-[id number]-[id number] N/A 10/13/2016    [facility] at [facility] OR    REMOVAL-ECTOPIC-LAPAROSCOPIC N/A 10/13/2016    [facility] at [facility] OR    SALPINGECTOMY-LAPAROSCOPIC Left 10/13/2016    [facility] OR    TUBAL LIGATION      bilateral salpingectomy     Family History   Problem Relation Age of Onset    Hypertension Mother     Cancer Mother         lung    Breast cancer Neg Hx     Colon cancer Neg Hx     Ovarian cancer Neg Hx     Thrombophilia Neg Hx      Social History     Socioeconomic History    Marital status:      Spouse name: Not on file    Number of children: Not on file    Years of education: Not on file    Highest education level: Not on file   Occupational History     Employer: [employer]   Social Needs    Financial resource strain: Somewhat hard    Food insecurity:     Worry: Sometimes true     Inability: Sometimes true    Transportation needs:     Medical: No     Non-medical: No   Tobacco Use    Smoking status: Never Smoker    Smokeless tobacco: Never Used   Substance and Sexual Activity    Alcohol use: Yes     Frequency: Monthly or less     Drinks per session: 1 or 2     Binge frequency: Never     Comment: occasionally    Drug use: No    Sexual activity: Yes     Partners: Male     Birth control/protection: Surgical   Lifestyle    Physical activity:     Days per week: 3 days     Minutes per session: 30 min    Stress: Only a little   Relationships    Social connections:      Talks on phone: Three times a week     Gets together: Once a week     Attends Denominational service: Not on file     Active member of club or organization: No     Attends meetings of clubs or organizations: Not on file     Relationship status:    Other Topics Concern    Not on file   Social History Narrative    She works at [employer] as a .     Review of Systems   Constitutional: Positive for unexpected weight change. Negative for activity change, appetite change, chills, diaphoresis, fatigue and fever.   HENT: Negative for congestion, drooling, ear discharge, ear pain, facial swelling, hearing loss, mouth sores, nosebleeds, postnasal drip, rhinorrhea, sinus pressure, sneezing, sore throat, tinnitus, trouble swallowing and voice change.    Eyes: Negative for photophobia, discharge, redness and visual disturbance.   Respiratory: Negative for apnea, cough, choking, chest tightness, shortness of breath and wheezing.    Cardiovascular: Negative for chest pain, palpitations and leg swelling.   Gastrointestinal: Negative for abdominal distention, abdominal pain, blood in stool, constipation, diarrhea, nausea and vomiting.   Endocrine: Negative for cold intolerance, heat intolerance, polydipsia, polyphagia and polyuria.   Genitourinary: Negative for decreased urine volume, difficulty urinating, dysuria, flank pain, frequency, genital sores, hematuria and urgency.   Musculoskeletal: Negative for arthralgias, back pain, gait problem, joint swelling, myalgias, neck pain and neck stiffness.   Skin: Negative for color change, pallor, rash and wound.   Allergic/Immunologic: Negative for food allergies and immunocompromised state.   Neurological: Positive for headaches. Negative for dizziness, tremors, seizures, syncope, speech difficulty, weakness, light-headedness and numbness.   Hematological: Negative for adenopathy. Does not bruise/bleed easily.   Psychiatric/Behavioral: Negative for agitation,  behavioral problems, confusion, decreased concentration, dysphoric mood, hallucinations, self-injury, sleep disturbance and suicidal ideas. The patient is not nervous/anxious and is not hyperactive.    All other systems reviewed and are negative.      Objective:      Physical Exam   Constitutional: She is oriented to person, place, and time. She appears well-developed and well-nourished. No distress.   HENT:   Head: Normocephalic and atraumatic.   Eyes: No scleral icterus.   Neck: Normal range of motion. Neck supple. No JVD present. Carotid bruit is not present. No tracheal deviation present. No thyromegaly present.   Cardiovascular: Normal rate, regular rhythm, normal heart sounds and intact distal pulses.   Pulmonary/Chest: Effort normal and breath sounds normal. No respiratory distress. She has no wheezes. She has no rales. She exhibits no tenderness.   Abdominal: Soft. Bowel sounds are normal. She exhibits no distension. There is no tenderness. There is no rebound and no guarding.   Musculoskeletal: Normal range of motion. She exhibits no edema or tenderness.   Lymphadenopathy:     She has no cervical adenopathy.   Neurological: She is alert and oriented to person, place, and time.   Skin: Skin is warm and dry. No rash noted. She is not diaphoretic. No erythema. No pallor.   Psychiatric: She has a normal mood and affect. Her behavior is normal. Judgment and thought content normal.   Nursing note and vitals reviewed.      CMP  Sodium   Date Value Ref Range Status   10/19/2018 136 136 - 145 mmol/L Final     Potassium   Date Value Ref Range Status   10/19/2018 3.7 3.5 - 5.1 mmol/L Final     Chloride   Date Value Ref Range Status   10/19/2018 104 95 - 110 mmol/L Final     CO2   Date Value Ref Range Status   10/19/2018 26 23 - 29 mmol/L Final     Glucose   Date Value Ref Range Status   10/19/2018 72 70 - 110 mg/dL Final     BUN, Bld   Date Value Ref Range Status   10/19/2018 15 6 - 20 mg/dL Final     Creatinine    Date Value Ref Range Status   10/19/2018 0.8 0.5 - 1.4 mg/dL Final     Calcium   Date Value Ref Range Status   10/19/2018 9.1 8.7 - 10.5 mg/dL Final     Total Protein   Date Value Ref Range Status   10/19/2018 7.6 6.0 - 8.4 g/dL Final     Albumin   Date Value Ref Range Status   10/19/2018 3.6 3.5 - 5.2 g/dL Final     Total Bilirubin   Date Value Ref Range Status   10/19/2018 0.3 0.1 - 1.0 mg/dL Final     Comment:     For infants and newborns, interpretation of results should be based  on gestational age, weight and in agreement with clinical  observations.  Premature Infant recommended reference ranges:  Up to 24 hours.............<8.0 mg/dL  Up to 48 hours............<12.0 mg/dL  3-5 days..................<15.0 mg/dL  6-29 days.................<15.0 mg/dL       Alkaline Phosphatase   Date Value Ref Range Status   10/19/2018 79 55 - 135 U/L Final     AST   Date Value Ref Range Status   10/19/2018 15 10 - 40 U/L Final     ALT   Date Value Ref Range Status   10/19/2018 11 10 - 44 U/L Final     Anion Gap   Date Value Ref Range Status   10/19/2018 6 (L) 8 - 16 mmol/L Final     eGFR if    Date Value Ref Range Status   10/19/2018 >60.0 >60 mL/min/1.73 m^2 Final     eGFR if non    Date Value Ref Range Status   10/19/2018 >60.0 >60 mL/min/1.73 m^2 Final     Comment:     Calculation used to obtain the estimated glomerular filtration  rate (eGFR) is the CKD-EPI equation.        Lab Results   Component Value Date    WBC 5.57 10/19/2018    HGB 11.7 (L) 10/19/2018    HCT 35.5 (L) 10/19/2018    MCV 87 10/19/2018     10/19/2018     Lab Results   Component Value Date    CHOL 145 10/19/2018     Lab Results   Component Value Date    HDL 38 (L) 10/19/2018     Lab Results   Component Value Date    LDLCALC 93.4 10/19/2018     Lab Results   Component Value Date    TRIG 68 10/19/2018     Lab Results   Component Value Date    CHOLHDL 26.2 10/19/2018     Lab Results   Component Value Date    TSH  0.722 10/19/2018     Lab Results   Component Value Date    HGBA1C 5.6 10/19/2018     Assessment:       1. Routine adult health maintenance    2. Obesity, unspecified classification, unspecified obesity type, unspecified whether serious comorbidity present        Plan:   Routine adult health maintenance  -     Comprehensive metabolic panel; Future; Expected date: 08/29/2019  -     CBC auto differential; Future; Expected date: 08/29/2019  -     Lipid panel; Future; Expected date: 08/29/2019  -     TSH; Future; Expected date: 08/29/2019  -     Hemoglobin A1c; Future; Expected date: 08/29/2019    Obesity, unspecified classification, unspecified obesity type, unspecified whether serious comorbidity present  -     Ambulatory referral to Nutrition Services    Stable-----------diet,exercise,weight loss.          F/u 1 year

## 2019-09-02 ENCOUNTER — TELEPHONE (OUTPATIENT)
Dept: FAMILY MEDICINE | Facility: CLINIC | Age: 34
End: 2019-09-02

## 2019-10-09 ENCOUNTER — PATIENT MESSAGE (OUTPATIENT)
Dept: OBSTETRICS AND GYNECOLOGY | Facility: CLINIC | Age: 34
End: 2019-10-09

## 2019-12-08 ENCOUNTER — PATIENT MESSAGE (OUTPATIENT)
Dept: OBSTETRICS AND GYNECOLOGY | Facility: CLINIC | Age: 34
End: 2019-12-08

## 2019-12-20 ENCOUNTER — TELEPHONE (OUTPATIENT)
Dept: PHARMACY | Facility: CLINIC | Age: 34
End: 2019-12-20

## 2019-12-20 ENCOUNTER — OFFICE VISIT (OUTPATIENT)
Dept: OBSTETRICS AND GYNECOLOGY | Facility: CLINIC | Age: 34
End: 2019-12-20
Payer: COMMERCIAL

## 2019-12-20 VITALS
HEIGHT: 63 IN | WEIGHT: 241.38 LBS | DIASTOLIC BLOOD PRESSURE: 70 MMHG | BODY MASS INDEX: 42.77 KG/M2 | SYSTOLIC BLOOD PRESSURE: 104 MMHG

## 2019-12-20 DIAGNOSIS — N94.6 DYSMENORRHEA: Primary | ICD-10-CM

## 2019-12-20 PROCEDURE — 99999 PR PBB SHADOW E&M-EST. PATIENT-LVL III: CPT | Mod: PBBFAC,,, | Performed by: NURSE PRACTITIONER

## 2019-12-20 PROCEDURE — 99999 PR PBB SHADOW E&M-EST. PATIENT-LVL III: ICD-10-PCS | Mod: PBBFAC,,, | Performed by: NURSE PRACTITIONER

## 2019-12-20 PROCEDURE — 3008F BODY MASS INDEX DOCD: CPT | Mod: CPTII,S$GLB,, | Performed by: NURSE PRACTITIONER

## 2019-12-20 PROCEDURE — 99213 OFFICE O/P EST LOW 20 MIN: CPT | Mod: S$GLB,,, | Performed by: NURSE PRACTITIONER

## 2019-12-20 PROCEDURE — 3008F PR BODY MASS INDEX (BMI) DOCUMENTED: ICD-10-PCS | Mod: CPTII,S$GLB,, | Performed by: NURSE PRACTITIONER

## 2019-12-20 PROCEDURE — 99213 PR OFFICE/OUTPT VISIT, EST, LEVL III, 20-29 MIN: ICD-10-PCS | Mod: S$GLB,,, | Performed by: NURSE PRACTITIONER

## 2019-12-20 NOTE — TELEPHONE ENCOUNTER
[facility] received a prescription for Mirena.   IUD's are excluded under the patient's pharmacy benefits. [facility] is unable to bill medical claims for medications.     The medication itself, and the administration of the medication, will both have to be billed under the medical benefit and may require a prior authorization. Please contact [facility] with any questions at [phone].

## 2019-12-20 NOTE — PROGRESS NOTES
"CC: Discuss treatment for heavy cycles    Ralph Hughes is a 34 y.o. female  presents for heavy cycles. Patient has been followed by Dr. Keene and "wants to discuss possible IUD placement". LMP: Patient's last menstrual period was 2019..      Past Medical History:   Diagnosis Date    Herpes simplex virus (HSV) infection      Past Surgical History:   Procedure Laterality Date     SECTION      x3    Ectopic Pregnacy Removal  10/13/2016    TUBAL LIGATION      bilateral salpingectomy     Social History     Socioeconomic History    Marital status:      Spouse name: Not on file    Number of children: Not on file    Years of education: Not on file    Highest education level: Not on file   Occupational History     Employer: [employer]   Social Needs    Financial resource strain: Somewhat hard    Food insecurity:     Worry: Sometimes true     Inability: Sometimes true    Transportation needs:     Medical: No     Non-medical: No   Tobacco Use    Smoking status: Never Smoker    Smokeless tobacco: Never Used   Substance and Sexual Activity    Alcohol use: Yes     Frequency: Monthly or less     Drinks per session: 1 or 2     Binge frequency: Never     Comment: occasionally    Drug use: No    Sexual activity: Yes     Partners: Male     Birth control/protection: Surgical   Lifestyle    Physical activity:     Days per week: 3 days     Minutes per session: 30 min    Stress: Only a little   Relationships    Social connections:     Talks on phone: Three times a week     Gets together: Once a week     Attends Judaism service: Not on file     Active member of club or organization: No     Attends meetings of clubs or organizations: Not on file     Relationship status:    Other Topics Concern    Not on file   Social History Narrative    She works at [employer] as a .     Family History   Problem Relation Age of Onset    Hypertension " "Mother     Cancer Mother         lung    Breast cancer Neg Hx     Colon cancer Neg Hx     Ovarian cancer Neg Hx     Thrombophilia Neg Hx      OB History        4    Para   3    Term   2       1    AB   1    Living   3       SAB   0    TAB   0    Ectopic   1    Multiple   0    Live Births   3                 /70 (BP Location: Right arm, Patient Position: Sitting, BP Method: Large (Manual))   Ht 5' 3" (1.6 m)   Wt 109.5 kg (241 lb 6.5 oz)   LMP 2019   BMI 42.76 kg/m²       ROS:  GENERAL: Denies weight gain or weight loss. Feeling well overall.   ABDOMEN: No abdominal pain, constipation, diarrhea, nausea, vomiting or rectal bleeding.   URINARY: No frequency, dysuria, hematuria, or burning on urination.  REPRODUCTIVE: See HPI.       1. Dysmenorrhea     I spent 20 minutes with this patient discussing treatment options for heavy cycles. IUD/Mirena ordered.             "

## 2019-12-23 ENCOUNTER — PATIENT MESSAGE (OUTPATIENT)
Dept: OBSTETRICS AND GYNECOLOGY | Facility: CLINIC | Age: 34
End: 2019-12-23

## 2019-12-23 ENCOUNTER — OFFICE VISIT (OUTPATIENT)
Dept: OBSTETRICS AND GYNECOLOGY | Facility: CLINIC | Age: 34
End: 2019-12-23
Payer: COMMERCIAL

## 2019-12-23 VITALS
WEIGHT: 240.94 LBS | BODY MASS INDEX: 42.68 KG/M2 | DIASTOLIC BLOOD PRESSURE: 70 MMHG | SYSTOLIC BLOOD PRESSURE: 120 MMHG

## 2019-12-23 DIAGNOSIS — N94.6 DYSMENORRHEA: Primary | ICD-10-CM

## 2019-12-23 PROCEDURE — 99999 PR PBB SHADOW E&M-EST. PATIENT-LVL II: CPT | Mod: PBBFAC,,, | Performed by: OBSTETRICS & GYNECOLOGY

## 2019-12-23 PROCEDURE — 3008F PR BODY MASS INDEX (BMI) DOCUMENTED: ICD-10-PCS | Mod: CPTII,S$GLB,, | Performed by: OBSTETRICS & GYNECOLOGY

## 2019-12-23 PROCEDURE — 99213 OFFICE O/P EST LOW 20 MIN: CPT | Mod: S$GLB,,, | Performed by: OBSTETRICS & GYNECOLOGY

## 2019-12-23 PROCEDURE — 99213 PR OFFICE/OUTPT VISIT, EST, LEVL III, 20-29 MIN: ICD-10-PCS | Mod: S$GLB,,, | Performed by: OBSTETRICS & GYNECOLOGY

## 2019-12-23 PROCEDURE — 99999 PR PBB SHADOW E&M-EST. PATIENT-LVL II: ICD-10-PCS | Mod: PBBFAC,,, | Performed by: OBSTETRICS & GYNECOLOGY

## 2019-12-23 PROCEDURE — 3008F BODY MASS INDEX DOCD: CPT | Mod: CPTII,S$GLB,, | Performed by: OBSTETRICS & GYNECOLOGY

## 2019-12-23 NOTE — PROGRESS NOTES
Subjective:       Patient ID: Ralph Hughes is a 34 y.o. female.    Chief Complaint:  Consult (ablation )      History of Present Illness  HPI  Pt was referred by NP to discuss endometrial ablation surgery.  Pt reports worsening dysmenorrhea despite anaprox.  Pt ordered Mirena at her last visit.  She was told about ablation and looked into the surgery.  Pt is interested and would like to explore this option some more.  Denies excessive bleeding with menses.    GYN & OB History  Patient's last menstrual period was 2019.   Date of Last Pap: 2019    OB History    Para Term  AB Living   4 3 2 1 1 3   SAB TAB Ectopic Multiple Live Births   0 0 1 0 3      # Outcome Date GA Lbr Vipin/2nd Weight Sex Delivery Anes PTL Lv   4 Term 17 39w1d  3.26 kg (7 lb 3 oz) M CS-LTranv Spinal  WENDI   3  06/15/11 34w0d  2.155 kg (4 lb 12 oz) F CS-LTranv EPI Y WENDI   2 Term 09 40w0d  3.459 kg (7 lb 10 oz) F CS-LTranv EPI N WENDI      Complications: Fetal Intolerance   1 Ectopic                Review of Systems  Review of Systems   Constitutional: Negative for activity change, appetite change, chills, fatigue, fever and unexpected weight change.   Respiratory: Negative for shortness of breath.    Cardiovascular: Negative for chest pain, palpitations and leg swelling.   Gastrointestinal: Negative for abdominal pain, bloating, blood in stool, constipation, diarrhea, nausea and vomiting.   Genitourinary: Positive for dysmenorrhea and menstrual problem. Negative for dyspareunia, dysuria, flank pain, frequency, genital sores, hematuria, menorrhagia, pelvic pain, urgency, vaginal bleeding, vaginal discharge, vaginal pain, urinary incontinence, postcoital bleeding, vaginal dryness and vaginal odor.   Musculoskeletal: Negative for back pain.   Neurological: Negative for syncope and headaches.           Objective:    Physical Exam:   Constitutional: She is oriented to person, place, and time. She appears  well-developed and well-nourished. No distress.                           Neurological: She is alert and oriented to person, place, and time.     Psychiatric: She has a normal mood and affect. Her behavior is normal. Thought content normal.          Assessment:        1. Dysmenorrhea             Plan:      Dysmenorrhea  -     Pt was counseled on dysmenorrhea, including common signs and symptoms.  Symptoms are highly disruptive and did not respond to NSAIDs alone or OCP in the past.  Pt is done with her fertility and has no desire for future childbearing (had BTL).  Treatment options were reviewed with pt, including medical vs IUD vs Ablation vs hysterectomy.  Limitations of ablation, including increased risk of failure secondary to age < 34 yo and likelihood of persistent pain symptoms was discussed.  Pt voiced understanding and desires to proceed with Mirena IUD as previously established.  Will return for Mirena insertion.      Follow up if symptoms worsen or fail to improve.

## 2020-01-07 ENCOUNTER — PATIENT MESSAGE (OUTPATIENT)
Dept: OBSTETRICS AND GYNECOLOGY | Facility: CLINIC | Age: 35
End: 2020-01-07

## 2020-01-16 ENCOUNTER — TELEPHONE (OUTPATIENT)
Dept: OBSTETRICS AND GYNECOLOGY | Facility: CLINIC | Age: 35
End: 2020-01-16

## 2020-01-16 DIAGNOSIS — N94.6 DYSMENORRHEA: ICD-10-CM

## 2020-01-16 DIAGNOSIS — Z30.430 ENCOUNTER FOR INSERTION OF MIRENA IUD: Primary | ICD-10-CM

## 2020-01-16 RX ORDER — METRONIDAZOLE 500 MG/1
500 TABLET ORAL 2 TIMES DAILY
Qty: 14 TABLET | Refills: 0 | Status: SHIPPED | OUTPATIENT
Start: 2020-01-16 | End: 2020-01-23

## 2020-01-16 RX ORDER — NAPROXEN SODIUM 550 MG/1
TABLET ORAL
Qty: 30 TABLET | Refills: 0 | Status: SHIPPED | OUTPATIENT
Start: 2020-01-16 | End: 2020-02-09

## 2020-01-16 RX ORDER — FLUCONAZOLE 150 MG/1
150 TABLET ORAL DAILY
Qty: 2 TABLET | Refills: 0 | Status: SHIPPED | OUTPATIENT
Start: 2020-01-16 | End: 2020-01-18

## 2020-01-16 NOTE — TELEPHONE ENCOUNTER
Snow Quesada           12/20/19 4:09 PM   Note      [facility] received a prescription for Mirena.   IUD's are excluded under the patient's pharmacy benefits. [facility] is unable to bill medical claims for medications.      The medication itself, and the administration of the medication, will both have to be billed under the medical benefit and may require a prior authorization. Please contact [facility] with any questions at [phone].

## 2020-01-16 NOTE — TELEPHONE ENCOUNTER
Spoke with patient in clinic notified that  will be placed and attached to a nurse visit. Pt aware to disregard visit. Will contact pt with referral results. Patient verbalized understanding

## 2020-01-18 DIAGNOSIS — B00.9 HSV-2 (HERPES SIMPLEX VIRUS 2) INFECTION: ICD-10-CM

## 2020-01-18 RX ORDER — VALACYCLOVIR HYDROCHLORIDE 500 MG/1
500 TABLET, FILM COATED ORAL 2 TIMES DAILY
Qty: 60 TABLET | Refills: 2 | Status: SHIPPED | OUTPATIENT
Start: 2020-01-18 | End: 2021-08-02 | Stop reason: SDUPTHER

## 2020-01-20 ENCOUNTER — TELEPHONE (OUTPATIENT)
Dept: OBSTETRICS AND GYNECOLOGY | Facility: CLINIC | Age: 35
End: 2020-01-20

## 2020-01-21 ENCOUNTER — TELEPHONE (OUTPATIENT)
Dept: OBSTETRICS AND GYNECOLOGY | Facility: CLINIC | Age: 35
End: 2020-01-21

## 2020-01-21 ENCOUNTER — PATIENT MESSAGE (OUTPATIENT)
Dept: OBSTETRICS AND GYNECOLOGY | Facility: CLINIC | Age: 35
End: 2020-01-21

## 2020-01-21 DIAGNOSIS — R30.0 DYSURIA: Primary | ICD-10-CM

## 2020-01-21 RX ORDER — NITROFURANTOIN 25; 75 MG/1; MG/1
100 CAPSULE ORAL 2 TIMES DAILY
Qty: 14 CAPSULE | Refills: 0 | Status: SHIPPED | OUTPATIENT
Start: 2020-01-21 | End: 2020-01-28

## 2020-01-28 ENCOUNTER — PATIENT MESSAGE (OUTPATIENT)
Dept: OBSTETRICS AND GYNECOLOGY | Facility: CLINIC | Age: 35
End: 2020-01-28

## 2020-01-28 DIAGNOSIS — Z97.5 INTRAUTERINE CONTRACEPTIVE DEVICE: Primary | ICD-10-CM

## 2020-01-28 DIAGNOSIS — Z30.014 ENCOUNTER FOR INITIAL PRESCRIPTION OF INTRAUTERINE CONTRACEPTIVE DEVICE (IUD): Primary | ICD-10-CM

## 2020-01-29 ENCOUNTER — TELEPHONE (OUTPATIENT)
Dept: OBSTETRICS AND GYNECOLOGY | Facility: CLINIC | Age: 35
End: 2020-01-29

## 2020-01-29 NOTE — TELEPHONE ENCOUNTER
Called patient and per the advice of Xochilt Pryor, prior auth supervisor, she will call her insurance to make sure either Lorna Pang or Clay Keene are in her net work so that we can order a new device under their name.    Patient stated she would send portal message back to us with the correct information.

## 2020-01-29 NOTE — TELEPHONE ENCOUNTER
----- Message from Dagmar Sanchez LPN sent at 1/29/2020  7:30 AM CST -----  Check with Xochilt EWING about providers and prior auth

## 2020-02-08 DIAGNOSIS — N94.6 DYSMENORRHEA: ICD-10-CM

## 2020-02-09 RX ORDER — NAPROXEN SODIUM 550 MG/1
TABLET ORAL
Qty: 30 TABLET | Refills: 0 | Status: SHIPPED | OUTPATIENT
Start: 2020-02-09 | End: 2020-05-13

## 2020-02-12 ENCOUNTER — PATIENT MESSAGE (OUTPATIENT)
Dept: OBSTETRICS AND GYNECOLOGY | Facility: CLINIC | Age: 35
End: 2020-02-12

## 2020-02-14 ENCOUNTER — TELEPHONE (OUTPATIENT)
Dept: OBSTETRICS AND GYNECOLOGY | Facility: CLINIC | Age: 35
End: 2020-02-14

## 2020-02-14 DIAGNOSIS — Z30.9 ENCOUNTER FOR CONTRACEPTIVE MANAGEMENT, UNSPECIFIED TYPE: Primary | ICD-10-CM

## 2020-02-20 ENCOUNTER — TELEPHONE (OUTPATIENT)
Dept: OBSTETRICS AND GYNECOLOGY | Facility: CLINIC | Age: 35
End: 2020-02-20

## 2020-02-20 NOTE — TELEPHONE ENCOUNTER
Spoke with Pre [facility] representative Ivette and notified that Mirena prescription was re-submitted under [facility] states she does not see this prescription. Informed rep that [facility] was [facility] and BCN was not submitted. States she will contact [facility] to verify that [facility] is covered.

## 2020-02-28 ENCOUNTER — TELEPHONE (OUTPATIENT)
Dept: OBSTETRICS AND GYNECOLOGY | Facility: CLINIC | Age: 35
End: 2020-02-28

## 2020-02-28 NOTE — TELEPHONE ENCOUNTER
Spoke with Kayli Fang in [facility] pharmacy states again Mirena is excluded with pharmacy benefits. Will need to process medication pre- auth. Spoke with Ivette Littlejohn in pre-cert that states [facility] is in pt's network, referral will need to be changed to this provider in order to reprocess. Referral changed, Annetta notified.

## 2020-02-28 NOTE — TELEPHONE ENCOUNTER
Spoke with patient notified she is covered by buy and bill for Mirena. Scheduled for 3/3/20 3:00 PM. Pt advised to call if period has not started. Patient verbalized understanding

## 2020-03-03 ENCOUNTER — TELEPHONE (OUTPATIENT)
Dept: OBSTETRICS AND GYNECOLOGY | Facility: CLINIC | Age: 35
End: 2020-03-03

## 2020-03-03 NOTE — TELEPHONE ENCOUNTER
----- Message from Jeane Norris sent at 3/3/2020  2:55 PM CST -----  Contact: pt  Pt cancelled appointment due to not flowing and would like a call back at [phone].        Thanks,  Jeane Norris

## 2020-03-08 PROCEDURE — 58300 PR INSERT INTRAUTERINE DEVICE: ICD-10-PCS | Mod: S$GLB,,, | Performed by: OBSTETRICS & GYNECOLOGY

## 2020-03-08 PROCEDURE — 58300 INSERT INTRAUTERINE DEVICE: CPT | Mod: S$GLB,,, | Performed by: OBSTETRICS & GYNECOLOGY

## 2020-03-11 ENCOUNTER — PATIENT MESSAGE (OUTPATIENT)
Dept: OBSTETRICS AND GYNECOLOGY | Facility: CLINIC | Age: 35
End: 2020-03-11

## 2020-03-18 ENCOUNTER — PROCEDURE VISIT (OUTPATIENT)
Dept: OBSTETRICS AND GYNECOLOGY | Facility: CLINIC | Age: 35
End: 2020-03-18
Payer: COMMERCIAL

## 2020-03-18 VITALS — DIASTOLIC BLOOD PRESSURE: 96 MMHG | WEIGHT: 250.69 LBS | BODY MASS INDEX: 44.4 KG/M2 | SYSTOLIC BLOOD PRESSURE: 138 MMHG

## 2020-03-18 DIAGNOSIS — Z30.430 ENCOUNTER FOR IUD INSERTION: Primary | ICD-10-CM

## 2020-03-18 PROCEDURE — 58300 PR INSERT INTRAUTERINE DEVICE: ICD-10-PCS | Mod: S$GLB,,, | Performed by: OBSTETRICS & GYNECOLOGY

## 2020-03-18 PROCEDURE — 58300 INSERT INTRAUTERINE DEVICE: CPT | Mod: S$GLB,,, | Performed by: OBSTETRICS & GYNECOLOGY

## 2020-03-18 NOTE — PROCEDURES
Procedures   CC: IUD PLACEMENT    Ralph Hughes is a 34 y.o. female  presents for a mirena IUD placement.  UPT is negative.        PRE-IUD PLACEMENT COUNSELING:  All contraceptive options were reviewed and the patient chooses an IUD.  The patient's history was reviewed and there are no contraindications to an IUD. The procedure and minimal risks of pain, bleeding, perforation and infection at the insertion and spontaneous expulsion within the first two weeks was discussed. The benefits of amenorrhea and no systemic side effects were explained. All questions were answered and the patient agrees to proceed. Consent was signed (scanned into computer).    EXAM:  Uterine Position: retroverted    PROCEDURE:  TIME OUT PERFORMED.  The cervix visualized with a speculum. Cervix cleaned w betadine (cervix is very anterior & high in vaginal canal-use metal speculum)  A single tooth tenaculum placed on the anterior lip.  The uterus sounded to 7 cm using sterile technique.  A Mirena IUD was loaded and placed high in uterine fundus without difficulty using sterile technique.  The string was cut to 2-3cm length from exo cervix.  The tenaculum and speculum were removed. The patient tolerated the procedure well.    ASSESSMENT:  1. Contraception management / IUD insertion.V25.0.    POST IUD PLACEMENT COUNSELING:  Manage post IUD placement pain with NSAIDs, Tylenol or Rx per [employer].  IUD danger signs and how to check the strings.    Counseling lasted approximately 15 minutes and all her questions were answered.    FOLLOW-UP: With me in 4 weeks.

## 2020-03-26 ENCOUNTER — PATIENT MESSAGE (OUTPATIENT)
Dept: OBSTETRICS AND GYNECOLOGY | Facility: CLINIC | Age: 35
End: 2020-03-26

## 2020-04-16 ENCOUNTER — PATIENT MESSAGE (OUTPATIENT)
Dept: FAMILY MEDICINE | Facility: CLINIC | Age: 35
End: 2020-04-16

## 2020-04-25 RX ORDER — SERTRALINE HYDROCHLORIDE 50 MG/1
TABLET, FILM COATED ORAL
Qty: 30 TABLET | Refills: 1 | Status: SHIPPED | OUTPATIENT
Start: 2020-04-25 | End: 2020-08-21

## 2020-05-06 ENCOUNTER — PATIENT MESSAGE (OUTPATIENT)
Dept: FAMILY MEDICINE | Facility: CLINIC | Age: 35
End: 2020-05-06

## 2020-05-13 ENCOUNTER — OFFICE VISIT (OUTPATIENT)
Dept: OBSTETRICS AND GYNECOLOGY | Facility: CLINIC | Age: 35
End: 2020-05-13
Payer: COMMERCIAL

## 2020-05-13 VITALS
HEIGHT: 63 IN | SYSTOLIC BLOOD PRESSURE: 126 MMHG | WEIGHT: 251.31 LBS | BODY MASS INDEX: 44.53 KG/M2 | DIASTOLIC BLOOD PRESSURE: 72 MMHG

## 2020-05-13 DIAGNOSIS — Z30.431 IUD CHECK UP: Primary | ICD-10-CM

## 2020-05-13 DIAGNOSIS — E66.01 MORBID OBESITY WITH BMI OF 40.0-44.9, ADULT: ICD-10-CM

## 2020-05-13 PROCEDURE — 3008F BODY MASS INDEX DOCD: CPT | Mod: CPTII,S$GLB,, | Performed by: OBSTETRICS & GYNECOLOGY

## 2020-05-13 PROCEDURE — 99999 PR PBB SHADOW E&M-EST. PATIENT-LVL III: CPT | Mod: PBBFAC,,, | Performed by: OBSTETRICS & GYNECOLOGY

## 2020-05-13 PROCEDURE — 99999 PR PBB SHADOW E&M-EST. PATIENT-LVL III: ICD-10-PCS | Mod: PBBFAC,,, | Performed by: OBSTETRICS & GYNECOLOGY

## 2020-05-13 PROCEDURE — 99213 OFFICE O/P EST LOW 20 MIN: CPT | Mod: S$GLB,,, | Performed by: OBSTETRICS & GYNECOLOGY

## 2020-05-13 PROCEDURE — 99213 PR OFFICE/OUTPT VISIT, EST, LEVL III, 20-29 MIN: ICD-10-PCS | Mod: S$GLB,,, | Performed by: OBSTETRICS & GYNECOLOGY

## 2020-05-13 PROCEDURE — 3008F PR BODY MASS INDEX (BMI) DOCUMENTED: ICD-10-PCS | Mod: CPTII,S$GLB,, | Performed by: OBSTETRICS & GYNECOLOGY

## 2020-05-13 NOTE — PROGRESS NOTES
Subjective:       Patient ID: Ralph Hughes is a 34 y.o. female.    Chief Complaint:  IUD Check      History of Present Illness  HPI  here for Mirena IUD check. Has not tried to check strings but denies problems. Reports weight gain since stay at home order, trying to walk/eat right. Has questions about zoloft vs wellbutrin for weight control    GYN & OB History  Patient's last menstrual period was 2020.   Date of Last Pap: 2019    OB History    Para Term  AB Living   4 3 2 1 1 3   SAB TAB Ectopic Multiple Live Births   0 0 1 0 3      # Outcome Date GA Lbr Vipin/2nd Weight Sex Delivery Anes PTL Lv   4 Term 17 39w1d  3.26 kg (7 lb 3 oz) M CS-LTranv Spinal  WENDI   3  06/15/11 34w0d  2.155 kg (4 lb 12 oz) F CS-LTranv EPI Y WENDI   2 Term 09 40w0d  3.459 kg (7 lb 10 oz) F CS-LTranv EPI N WENDI      Complications: Fetal Intolerance   1 Ectopic                Review of Systems  Review of Systems   All other systems reviewed and are negative.      Objective:     Physical Exam   Constitutional: She is oriented to person, place, and time. She appears well-developed and well-nourished.   Pulmonary/Chest: Effort normal.   Genitourinary:   Genitourinary Comments: Normal external genitalia. IUD strings visible, in place (very anterior cervix w/ posterior vaginal wall relaxation requiring displacement w/ rectal swab in order for cervix to be visualized)   Musculoskeletal: Normal range of motion.   Neurological: She is alert and oriented to person, place, and time.   Skin: Skin is warm and dry.   Psychiatric: She has a normal mood and affect. Her behavior is normal.        Assessment:        1. IUD check up    2. Morbid obesity with BMI of 40.0-44.9, adult         Plan:      1. approp diet & exercise discussed  2. rec continuing on zoloft at this time

## 2020-06-16 DIAGNOSIS — M25.562 PAIN IN BOTH KNEES, UNSPECIFIED CHRONICITY: Primary | ICD-10-CM

## 2020-06-16 DIAGNOSIS — M25.561 PAIN IN BOTH KNEES, UNSPECIFIED CHRONICITY: Primary | ICD-10-CM

## 2020-06-19 ENCOUNTER — HOSPITAL ENCOUNTER (OUTPATIENT)
Dept: RADIOLOGY | Facility: HOSPITAL | Age: 35
Discharge: HOME | End: 2020-06-19
Attending: FAMILY MEDICINE
Payer: COMMERCIAL

## 2020-06-19 ENCOUNTER — OFFICE VISIT (OUTPATIENT)
Dept: ORTHOPEDICS | Facility: CLINIC | Age: 35
End: 2020-06-19
Payer: COMMERCIAL

## 2020-06-19 VITALS
WEIGHT: 251 LBS | DIASTOLIC BLOOD PRESSURE: 75 MMHG | BODY MASS INDEX: 44.47 KG/M2 | SYSTOLIC BLOOD PRESSURE: 125 MMHG | HEART RATE: 94 BPM | HEIGHT: 63 IN

## 2020-06-19 DIAGNOSIS — M25.561 PAIN IN BOTH KNEES, UNSPECIFIED CHRONICITY: Primary | ICD-10-CM

## 2020-06-19 DIAGNOSIS — M25.562 PAIN IN BOTH KNEES, UNSPECIFIED CHRONICITY: Primary | ICD-10-CM

## 2020-06-19 DIAGNOSIS — M25.562 PAIN IN BOTH KNEES, UNSPECIFIED CHRONICITY: ICD-10-CM

## 2020-06-19 DIAGNOSIS — M25.561 PAIN IN BOTH KNEES, UNSPECIFIED CHRONICITY: ICD-10-CM

## 2020-06-19 PROCEDURE — 3008F PR BODY MASS INDEX (BMI) DOCUMENTED: ICD-10-PCS | Mod: CPTII,S$GLB,, | Performed by: FAMILY MEDICINE

## 2020-06-19 PROCEDURE — 99999 PR PBB SHADOW E&M-EST. PATIENT-LVL III: CPT | Mod: PBBFAC,,, | Performed by: FAMILY MEDICINE

## 2020-06-19 PROCEDURE — 99204 OFFICE O/P NEW MOD 45 MIN: CPT | Mod: S$GLB,,, | Performed by: FAMILY MEDICINE

## 2020-06-19 PROCEDURE — 73564 X-RAY EXAM KNEE 4 OR MORE: CPT | Mod: 26,50,, | Performed by: RADIOLOGY

## 2020-06-19 PROCEDURE — 99999 PR PBB SHADOW E&M-EST. PATIENT-LVL III: ICD-10-PCS | Mod: PBBFAC,,, | Performed by: FAMILY MEDICINE

## 2020-06-19 PROCEDURE — 73564 X-RAY EXAM KNEE 4 OR MORE: CPT | Mod: TC,50

## 2020-06-19 PROCEDURE — 99204 PR OFFICE/OUTPT VISIT, NEW, LEVL IV, 45-59 MIN: ICD-10-PCS | Mod: S$GLB,,, | Performed by: FAMILY MEDICINE

## 2020-06-19 PROCEDURE — 73564 XR KNEE ORTHO BILAT WITH FLEXION: ICD-10-PCS | Mod: 26,50,, | Performed by: RADIOLOGY

## 2020-06-19 PROCEDURE — 3008F BODY MASS INDEX DOCD: CPT | Mod: CPTII,S$GLB,, | Performed by: FAMILY MEDICINE

## 2020-06-19 RX ORDER — DICLOFENAC SODIUM 10 MG/G
2 GEL TOPICAL 4 TIMES DAILY
Qty: 100 G | Refills: 5 | Status: SHIPPED | OUTPATIENT
Start: 2020-06-19 | End: 2021-12-10

## 2020-06-19 NOTE — PATIENT INSTRUCTIONS
Ice to knees to 3 times a day for 15 min as needed    Tylenol or ibuprofen as needed    Diclofenac gel 3 times a day    Physical therapy    Recheck here about 6-8 weeks

## 2020-06-19 NOTE — PROGRESS NOTES
Subjective:     Patient ID: Ralph Hughes is a 35 y.o. female.    Chief Complaint: Pain of the Left Knee and Pain of the Right Knee      HPI:  Patient with longstanding bilateral knee pain and stiffness with no specific treatment previously.  No specific injuries known.  Patient works at [employer] on [email or web address] and is limping sometimes by the end of the workday from walking and standing all day.    No swelling but the patient has stiffness and pain with standing after sitting.  No giving way and no locking.  No recent illness such as fever weight loss chills shortness of breath or chest pain.  Has never been to physical therapy for this problem and does not take any medication.  The patient does work on weight loss and is walking 30 min a day but states that she remembers having some knee pain even when she was much lighter several years ago.  She states she has a family history of knee arthritis.    Past Medical History:   Diagnosis Date    Herpes simplex virus (HSV) infection      Past Surgical History:   Procedure Laterality Date     SECTION      x3    Ectopic Pregnacy Removal  10/13/2016    TUBAL LIGATION      bilateral salpingectomy     Family History   Problem Relation Age of Onset    Hypertension Mother     Cancer Mother         lung    Breast cancer Neg Hx     Colon cancer Neg Hx     Ovarian cancer Neg Hx     Thrombophilia Neg Hx      Social History     Socioeconomic History    Marital status:      Spouse name: Not on file    Number of children: Not on file    Years of education: Not on file    Highest education level: Not on file   Occupational History     Employer: [employer]   Social Needs    Financial resource strain: Somewhat hard    Food insecurity     Worry: Sometimes true     Inability: Sometimes true    Transportation needs     Medical: No     Non-medical: No   Tobacco Use    Smoking status: Never Smoker    Smokeless tobacco:  Never Used   Substance and Sexual Activity    Alcohol use: Not Currently     Frequency: Monthly or less     Drinks per session: 1 or 2     Binge frequency: Never     Comment: occasionally    Drug use: No    Sexual activity: Not Currently     Partners: Male     Birth control/protection: Surgical, I.U.D.     Comment: Mirena    Lifestyle    Physical activity     Days per week: 3 days     Minutes per session: 30 min    Stress: Only a little   Relationships    Social connections     Talks on phone: Three times a week     Gets together: Once a week     Attends Gnosticism service: Not on file     Active member of club or organization: No     Attends meetings of clubs or organizations: Not on file     Relationship status:    Other Topics Concern    Not on file   Social History Narrative    She works at [employer] as a .       Current Outpatient Medications:     levonorgestreL (MIRENA) 20 mcg/24 hours (5 yrs) 52 mg IUD, 1 each by Intrauterine route once., Disp: , Rfl:     sertraline (ZOLOFT) 50 MG tablet, TAKE ONE TABLET BY MOUTH ONCE DAILY, Disp: 30 tablet, Rfl: 1    albuterol (VENTOLIN HFA) 90 mcg/actuation inhaler, Inhale 2 puffs into the lungs every 6 (six) hours as needed for Wheezing. Rescue, Disp: 18 g, Rfl: 0    valACYclovir (VALTREX) 500 MG tablet, Take 1 tablet (500 mg total) by mouth 2 (two) times daily. (Patient taking differently: Take 500 mg by mouth as needed. ), Disp: 60 tablet, Rfl: 2  Review of patient's allergies indicates:  No Known Allergies  Review of Systems   Constitutional: Negative for chills, fever and weight loss.   Respiratory: Negative for shortness of breath.    Cardiovascular: Negative for chest pain and palpitations.       Objective:   Body mass index is 44.46 kg/m².  Vitals:    06/19/20 1543   BP: 125/75   Pulse: 94           Ortho/SPM Exam alert and oriented obese pleasant adult female ambulatory and in no acute distress    Respiratory effort is  normal    Bilateral knee exam-no acute deformity swelling but some chronic bony changes noted    Range of motion 0-120 degrees    Strength is 4/5    Neurovascular intact    Joint is stable with minimal laxity and negative Lachman's    Nontender to palpation    Psychiatric good affect cognition    Discussion-the patient will try physical therapy which I think would be extremely helpful for her and of course better conditioning and lower body weight be helpful.  We will also try diclofenac gel and daily application of ice.  She understands that we can do cortisone injection if needed in the future.  Also possible consideration of viscosupplementation.    IMAGING: X-ray Knee Ortho Bilateral with Flexion  Narrative: EXAMINATION:  XR KNEE ORTHO BILAT WITH FLEXION    CLINICAL HISTORY:  Pain in right knee    TECHNIQUE:  AP standing of both knees, PA flexion standing views of both knees, and Merchant views of both knees were performed.  Lateral views of both knees were also performed.    COMPARISON:  09/13/2013 right knee radiographs.  09/13/2013 left knee radiographs.    FINDINGS:  No areas of high-grade joint space narrowing.  No significant effusion.  No fracture or dislocation.  Soft tissues are normal and symmetric in appearance.  Impression: As above    Electronically signed by: Wilbur Solis MD  Date:    06/19/2020  Time:    15:39       Radiographs / Imaging : Relevant imaging results reviewed by me and interpreted by me, discussed with the patient and / or family -reviewed with patient and discussed although I feel that there is some medial compartment loss which is not mentioned above.      Assessment:     Encounter Diagnosis   Name Primary?    Pain in both knees, unspecified chronicity Yes        Plan:   Pain in both knees, unspecified chronicity        The patient verbalized good understanding of the medical issues discussed today and expressed appreciation for the care provided.  Patient was given the  opportunity to ask questions and be an active participant in their medical care. Patient had no further questions or concerns at this time.     The patient was encouraged to participate in appropriate physical activity.      Christopher Tran M.D.  [facility]        This note was dictated using voice recognition software and may have sound a like errors.

## 2020-07-24 ENCOUNTER — TELEPHONE (OUTPATIENT)
Dept: ORTHOPEDICS | Facility: CLINIC | Age: 35
End: 2020-07-24

## 2020-08-14 ENCOUNTER — OFFICE VISIT (OUTPATIENT)
Dept: PULMONOLOGY | Facility: CLINIC | Age: 35
End: 2020-08-14
Payer: COMMERCIAL

## 2020-08-14 VITALS
RESPIRATION RATE: 18 BRPM | HEIGHT: 63 IN | SYSTOLIC BLOOD PRESSURE: 126 MMHG | OXYGEN SATURATION: 97 % | WEIGHT: 256.38 LBS | HEART RATE: 100 BPM | DIASTOLIC BLOOD PRESSURE: 82 MMHG | BODY MASS INDEX: 45.43 KG/M2 | TEMPERATURE: 98 F

## 2020-08-14 DIAGNOSIS — R29.818 SUSPECTED SLEEP APNEA: Primary | ICD-10-CM

## 2020-08-14 DIAGNOSIS — G47.8 NON-RESTORATIVE SLEEP: ICD-10-CM

## 2020-08-14 DIAGNOSIS — Z87.09 HISTORY OF ASTHMA: ICD-10-CM

## 2020-08-14 DIAGNOSIS — R06.83 SNORING: ICD-10-CM

## 2020-08-14 DIAGNOSIS — R40.0 HAS DAYTIME DROWSINESS: ICD-10-CM

## 2020-08-14 DIAGNOSIS — E66.01 CLASS 3 SEVERE OBESITY WITH BODY MASS INDEX (BMI) OF 45.0 TO 49.9 IN ADULT, UNSPECIFIED OBESITY TYPE, UNSPECIFIED WHETHER SERIOUS COMORBIDITY PRESENT: ICD-10-CM

## 2020-08-14 DIAGNOSIS — G47.19 EXCESSIVE DAYTIME SLEEPINESS: ICD-10-CM

## 2020-08-14 PROCEDURE — 99204 OFFICE O/P NEW MOD 45 MIN: CPT | Mod: S$GLB,,, | Performed by: INTERNAL MEDICINE

## 2020-08-14 PROCEDURE — 99999 PR PBB SHADOW E&M-EST. PATIENT-LVL III: ICD-10-PCS | Mod: PBBFAC,,, | Performed by: INTERNAL MEDICINE

## 2020-08-14 PROCEDURE — 3008F PR BODY MASS INDEX (BMI) DOCUMENTED: ICD-10-PCS | Mod: CPTII,S$GLB,, | Performed by: INTERNAL MEDICINE

## 2020-08-14 PROCEDURE — 99204 PR OFFICE/OUTPT VISIT, NEW, LEVL IV, 45-59 MIN: ICD-10-PCS | Mod: S$GLB,,, | Performed by: INTERNAL MEDICINE

## 2020-08-14 PROCEDURE — 99999 PR PBB SHADOW E&M-EST. PATIENT-LVL III: CPT | Mod: PBBFAC,,, | Performed by: INTERNAL MEDICINE

## 2020-08-14 PROCEDURE — 3008F BODY MASS INDEX DOCD: CPT | Mod: CPTII,S$GLB,, | Performed by: INTERNAL MEDICINE

## 2020-08-14 NOTE — PROGRESS NOTES
Initial Outpatient Pulmonary Evaluation       SUBJECTIVE:     Chief Complaint   Patient presents with    Asthma       History of Present Illness:    Patient is a 35 y.o. female presenting for evaluation of suspected sleep apnea.    Did have history of asthma during 1 of her pregnancies which is dormant.  Not needing albuterol.    Complains of excessive daytime sleepiness, [place] Sleepiness Scale score today 24, snoring, witnessed apneic spells and nonrestorative sleep.    She works as a  with a medical office.              STOP - BANG Questionnaire:     1. Snoring : Do you snore loudly ?    Yes    2. Tired : Do you often feel tired, fatigued, or sleepy during daytime? Yes    3. Observed: Has anyone observed you stop breathing during your sleep?   Yes     4. Blood pressure : Do you have or are you being treated for high blood pressure?   No    5. BMI :BMI more than 35 kg/m2?   Yes    6. Age : Age over 50 yr old?   No    7. Neck circumference:   For male, is your shirt collar 17 inches / 43cm or larger?  For female, is your shirt collar 16 inches / 41cm or larger?    Yes    8. Gender: Gender male?   No    STOP BANG SCORE 5    High risk of THERON: Yes 5 - 8  Intermediate risk of THERON: Yes 3 - 4  Low risk of THERON: Yes 0 - 2      References:   STOP Questionnaire   A Tool to Screen Patients for Obstructive Sleep Apnea: PRETTY Dutton.C.P.C., DAVID Morales.B.B.S., Leydi Streeter M.D.,Ivette Cespedes, Ph.D., Raisa Sparks M.B.B.S.,_ DAVID Ferguson.Sc.,_ Stefany Esqueda M.D., ANSELMO Mena.R.C.P.C.; Anesthesiology 2008; 108:812-21 Copyright © 2008, the American Society of Anesthesiologists, Inc. [employer]    Review of Systems   Constitutional: Positive for fatigue. Negative for fever and chills.   HENT: Negative for nosebleeds.    Eyes: Negative for redness.   Respiratory: Positive for apnea, snoring and somnolence. Negative for  choking.    Genitourinary: Negative for hematuria.   Endocrine: Negative for cold intolerance.    Musculoskeletal: Positive for arthralgias.   Skin: Negative for rash.   Gastrointestinal: Negative for vomiting.   Neurological: Negative for syncope.   Hematological: Negative for adenopathy.   Psychiatric/Behavioral: Positive for sleep disturbance. Negative for confusion.       Review of patient's allergies indicates:  No Known Allergies    Current Outpatient Medications   Medication Sig Dispense Refill    albuterol (VENTOLIN HFA) 90 mcg/actuation inhaler Inhale 2 puffs into the lungs every 6 (six) hours as needed for Wheezing. Rescue 18 g 0    diclofenac sodium (VOLTAREN) 1 % Gel Apply 2 grams topically 4 (four) times daily. 100 g 5    levonorgestreL (MIRENA) 20 mcg/24 hours (5 yrs) 52 mg IUD 1 each by Intrauterine route once.      valACYclovir (VALTREX) 500 MG tablet Take 1 tablet (500 mg total) by mouth 2 (two) times daily. (Patient taking differently: Take 500 mg by mouth as needed. ) 60 tablet 2    sertraline (ZOLOFT) 50 MG tablet TAKE ONE TABLET BY MOUTH ONCE DAILY (Patient not taking: Reported on 2020) 30 tablet 1     No current facility-administered medications for this visit.        Past Medical History:   Diagnosis Date    Herpes simplex virus (HSV) infection      Past Surgical History:   Procedure Laterality Date     SECTION      x3    Ectopic Pregnacy Removal  10/13/2016    TUBAL LIGATION      bilateral salpingectomy     Family History   Problem Relation Age of Onset    Hypertension Mother     Cancer Mother         lung    Breast cancer Neg Hx     Colon cancer Neg Hx     Ovarian cancer Neg Hx     Thrombophilia Neg Hx      Social History     Tobacco Use    Smoking status: Never Smoker    Smokeless tobacco: Never Used   Substance Use Topics    Alcohol use: Not Currently     Frequency: Monthly or less     Drinks per session: 1 or 2     Binge frequency: Never     Comment:  "occasionally    Drug use: No          OBJECTIVE:     Vital Signs (Most Recent)  Vital Signs  Temp: 97.7 °F (36.5 °C)  Pulse: 100  Resp: 18  SpO2: 97 %  BP: 126/82  Height and Weight  Height: 5' 3" (160 cm)  Weight: 116.3 kg (256 lb 6.3 oz)  BSA (Calculated - sq m): 2.27 sq meters  BMI (Calculated): 45.4  Weight in (lb) to have BMI = 25: 140.8]  Wt Readings from Last 2 Encounters:   08/14/20 116.3 kg (256 lb 6.3 oz)   06/19/20 113.9 kg (251 lb)         Physical Exam:  Physical Exam   Constitutional: She is oriented to person, place, and time. She appears well-developed and well-nourished. She is obese.   HENT:   Head: Normocephalic.   Neck: Neck supple.   Cardiovascular: Normal rate, regular rhythm and normal heart sounds.   Pulmonary/Chest: Normal expansion and effort normal. No stridor. No respiratory distress. She exhibits no tenderness.   Abdominal: Soft.   Musculoskeletal:         General: No tenderness.   Lymphadenopathy:     She has no cervical adenopathy.   Neurological: She is alert and oriented to person, place, and time. Gait normal.   Skin: Skin is warm. No cyanosis. Nails show no clubbing.   Psychiatric: She has a normal mood and affect. Her behavior is normal. Judgment and thought content normal.   Nursing note and vitals reviewed.      Laboratory  Lab Results   Component Value Date    WBC 4.76 08/30/2019    RBC 4.22 08/30/2019    HGB 11.7 (L) 08/30/2019    HCT 36.3 (L) 08/30/2019    MCV 86 08/30/2019    MCH 27.7 08/30/2019    MCHC 32.2 08/30/2019    RDW 14.6 (H) 08/30/2019     08/30/2019    MPV 12.5 08/30/2019    GRAN 2.0 08/30/2019    GRAN 42.0 08/30/2019    LYMPH 1.9 08/30/2019    LYMPH 40.8 08/30/2019    MONO 0.7 08/30/2019    MONO 15.3 (H) 08/30/2019    EOS 0.1 08/30/2019    BASO 0.02 08/30/2019    EOSINOPHIL 1.1 08/30/2019    BASOPHIL 0.4 08/30/2019       BMP  Lab Results   Component Value Date     08/30/2019    K 4.0 08/30/2019     08/30/2019    CO2 24 08/30/2019    BUN 14 " "08/30/2019    CREATININE 0.8 08/30/2019    CALCIUM 9.3 08/30/2019    ANIONGAP 10 08/30/2019    ESTGFRAFRICA >60.0 08/30/2019    EGFRNONAA >60.0 08/30/2019    AST 12 08/30/2019    ALT 12 08/30/2019    PROT 7.7 08/30/2019       No results found for: BNP    Lab Results   Component Value Date    TSH 0.514 08/30/2019       Lab Results   Component Value Date    SEDRATE 35 (H) 09/13/2013       Lab Results   Component Value Date    CRP 6.1 09/13/2013       No results found for: IGE    No results found for: ASPERGILLUS  No results found for: AFUMIGATUSCL     No results found for: ACE          ASSESSMENT/PLAN:     Suspected sleep apnea  -     Home Sleep Studies; Future    Snoring  -     Home Sleep Studies; Future    Excessive daytime sleepiness  -     Home Sleep Studies; Future    Non-restorative sleep  -     Home Sleep Studies; Future    History of asthma    Class 3 severe obesity with body mass index (BMI) of 45.0 to 49.9 in adult, unspecified obesity type, unspecified whether serious comorbidity present    Has daytime drowsiness  -     Home Sleep Studies; Future      General weight loss/lifestyle modification strategies discussed (elicit support from others; identify saboteurs; non-food rewards).  Diet interventions: low calorie (1000 kCal/d) deficit diet      Patient was educated about the symptoms and signs of drowsy driving and about effective countermeasures. Symptoms of drowsy driving include difficulty focusing, frequent blinking, heavy eyelids, daydreaming, wandering/disconnected thoughts, difficulty remembering the last few miles driven (sometimes called "automatic behavior") or missing exits and street signs, frequent yawning, rubbing eyes, difficulty keeping head up, drifting from aman to aman, tailgating or hitting a shoulder, and feeling restless and irritable .  Patient was made  aware that the ability to self-rate sleepiness and performance is unreliable . Although performance continues to decline with " cumulative days of sleep deprivation, subjective ratings of sleepiness tend to level off after the first few days in laboratory conditions of sleep deprivation , and drivers are often unaware of their own level of sleepiness .  Rather than driving while drowsy, patient was told to use other modes of transportation, such as ride sharing, public transportation, taxis, or walking. Importantly, drivers should be advised to plan ahead so that they avoid driving during times of day when they are likely to be sleepy, such as mid-afternoon, late at night, or after a period of sleep deprivation; to keep their trips short (under 20 minutes); and to use alternative modes of transportation.  Patient was warned about the risk of driving while drowsy.  Patient was instructed that patients who are considered high-risk (eg, those with excessive daytime sleepiness and a history of a drowsy driving crash or near miss) should be warned not to drive until therapy has been instituted and proven effective.        Follow up in about 2 months (around 10/14/2020).    This note was prepared using voice recognition system and is likely to have sound alike errors that may have been overlooked even after proof reading.  Please call me with any questions    Discussed diagnosis, its evaluation, treatment and usual course. All questions answered.    Thank you for the courtesy of participating in the care of this patient    Latrell Sanchez MD

## 2020-08-14 NOTE — PATIENT INSTRUCTIONS
No eating / drinking for 3 hours before going to bed.  Elevate head of bed 30 - 45 %     CPAP HABITUATION PROCEDURE     Declan Bland, Ph.D., [facility] and Darron Cristobal M.D.  [facility], [facility]     Some people have difficulty adjusting to CPAP/BiPAP/AutoCPAP.  This is not unusual or hard to understand: Breathing with CPAP is different from ordinary breathing, and this difference is aversive to some. The problem can be overcome, however, and the benefits CPAP confers are certainly worth the effort.  Below, you will find a simple and gradual way to get used to CPAP before you try to use it all night, every night.  The essence of this procedure is to relax and let breathing with CPAP become a habit.  It may take about 2 weeks, and involves the followin. CPAP while awake and comfortably seated, during the late evening.     2. CPAP in bed while attempting sleep at night.     3. If your discomfort is too great at any time, discontinue and attempt again later the same night, for the same amount of time.   4. You and your physician may alter the times and pressures if necessary.     5. If you find that it is very easy to get used to CPAP, you may start using it every night when you are comfortable enough to do so.  6. IMPORTANT REMINDER: If you have a cold or sinus congestion it is okay to miss a night or two of CPAP. Consider using antihistamines or decongestants to clear up your sinus congestion prior to sleeping.     DAYS  1-3   Start CPAP while awake and comfortably seated during the late evening, after having prepared for bed.  You may do this while watching television, listening to music or reading. Use for 1 hour, then take off CPAP and go directly to bed to sleep     DAYS  4-6     Start CPAP when you go to bed and use for 1 hour, or until you fall asleep.  If your discomfort is too great at any time, discontinue and attempt again later the same night, for the  same designated amount of time (1 hour).      DAYS  7-9     Increase time with CPAP to 2 hours a night.  If your discomfort is too great at any time, discontinue and attempt again later the same night, for the same designated amount of time (2 hours).      DAYS 10-12    Increase time with CPAP to 3 hours a night. If your discomfort is too great at any time, discontinue and attempt again later the same night, for the same designated amount of time (3 hours).      DAYS 13-15     Sleep the entire night with CPAP.      OPTIONAL: You may use Progressive Muscle Relaxation (PMR) to help put you at ease when using CPAP; do PMR twice each day, once in the morning or afternoon, and once in the evening just before using CPAP. You may do PMR prior to any attempt until you are comfortable with CPAP.        Continuous Positive Air Pressure (CPAP)  Continuous positive air pressure (CPAP) uses gentle air pressure to hold the airway open. CPAP is often the most effective treatment for sleep apnea and severe snoring. It works very well for many people. But keep in mind that it can take several adjustments before the setup is right for you.       How CPAP Works  CPAP is a small portable pump beside the bed. The pump sends air through a hose, which is held over your nose and/or mouth by a mask. Mild air pressure is gently pushed through your airway. The air pressure nudges sagging tissues aside. This widens the airway so you can breathe better. CPAP may be combined with other kinds of therapy for sleep apnea.       Types of Air Pressure Treatments  There are different types of CPAP. Your doctor or CPAP technician will help you decide which type is best for you:  · Basic CPAP keeps the pressure constant all night long.  · A bilevel device (BiPAP) provides more pressure when you breathe in and less when you breathe out. A BiPAP machine also may be set to provide automatic breaths to maintain breathing if you stop breathing while  sleeping.  · An autoCPAP device automatically adjusts pressure throughout the night and in response to changes such as body position, sleep stage, and snoring.  © 4029-3975 [employer]. [address] [place] [address]. All rights reserved. This information is not intended as a substitute for professional medical care. Always follow your healthcare professional's instructions.        Snoring and Sleep Apnea: Notes for a Partner  Snoring and sleep apnea affect your life, as well as your partners. You can help in the treatment of the problem. Be supportive. Encourage your partner both to get treatment and to make the adjustments needed to follow through.       Adjusting to Changes  Your partners treatment may involve making changes to certain life habits. You can help your partner make and stick with these changes. For example:  · Support and even join your partners exercise program.  · Be supportive if your partner gets CPAP (continuous positive airway pressure). He or she may feel self-conscious at first. Remind your partner to expect adjustments to CPAP before it feels just right.  · Consider joining a snoring and sleep apnea support group.  Go Along to See the Health Care Provider  You can give the health care provider the best account of your partners nighttime breathing and snoring patterns. Try to go along to health care providers appointments. If you cant go, write notes for your partner to give to the health care provider. Describe your partners snoring and sleep breathing patterns in detail.  Tips for Sleeping with a Snorer  Until treatment takes care of your partners snoring:  · Try to go to bed first. It may help if youre already asleep when your partner starts to snore.  · Wear earplugs to bed. A fan or other source of background noise may also help drown out snoring.   © 5146-0563 [employer]. [address] [place] [address]. All rights reserved.  This information is not intended as a substitute for professional medical care. Always follow your healthcare professional's instructions.        Continuous Positive Airway Pressure (CPAP)  Your health care provider has prescribed continuous positive airway pressure (CPAP) therapy for you. A CPAP device helps you breathe better at night. The device delivers air through your nose or mouth when you breathe in to keep your air passages open. CPAP is:  · Used most often to treat sleep apnea and some other problems (Sleep apnea is a chronic condition with periods of sleep in which you briefly stop breathing.)  · Safe and very effective, but it takes time to get used to the mask.   Your health care provider, nurse, or medical supplier will give you tips for wearing and caring for your CPAP device.  General guidelines  · It's very important not to give up! It takes time to get used to wearing the mask at night.  · Practice using your CPAP device during the day, especially whenever you take a nap.  · Remember, there are several different types of masks. If you cant get used to your mask, ask your provider or medical supply company about trying another style.  · If you have nasal stuffiness or dryness when using your CPAP device, talk with your provider or medical supply company. There are ways to lessen these problems. For example, your provider may recommend moistening nasal spray or the medical supply company may recommend a device with a humidifier.  · The goal is to use your CPAP all night, every night, during all naps, and even when you travel.  · Keep your mask clean. Wash it with soap and water. Be sure to rinse the mask and tubing well with water to remove any soap. Let them air-dry thoroughly before using.  · Make yourself comfortable when sleeping with CPAP. Try using extra pillows.  Work with your medical supply company so that you know how to correctly use your CPAP. Their representative will be able to help  "you:  · Use the CPAP correctly  · Troubleshoot any problems that come up  · Learn to clean and maintain the device  · Adjust to regular use of the CPAP  © 7328-0536 [employer]. [address] [place] [address]. All rights reserved. This information is not intended as a substitute for professional medical care. Always follow your healthcare professional's instructions.          Patient was educated about the symptoms and signs of drowsy driving and about effective countermeasures. Symptoms of drowsy driving include difficulty focusing, frequent blinking, heavy eyelids, daydreaming, wandering/disconnected thoughts, difficulty remembering the last few miles driven (sometimes called "automatic behavior") or missing exits and street signs, frequent yawning, rubbing eyes, difficulty keeping head up, drifting from aman to aman, tailgating or hitting a shoulder, and feeling restless and irritable .  Patient was made  aware that the ability to self-rate sleepiness and performance is unreliable . Although performance continues to decline with cumulative days of sleep deprivation, subjective ratings of sleepiness tend to level off after the first few days in laboratory conditions of sleep deprivation , and drivers are often unaware of their own level of sleepiness .  Rather than driving while drowsy, patient was told to use other modes of transportation, such as ride sharing, public transportation, taxis, or walking. Importantly, drivers should be advised to plan ahead so that they avoid driving during times of day when they are likely to be sleepy, such as mid-afternoon, late at night, or after a period of sleep deprivation; to keep their trips short (under 20 minutes); and to use alternative modes of transportation.  Patient was warned about the risk of driving while drowsy.  Patient was instructed that patients who are considered high-risk (eg, those with excessive daytime sleepiness and a history of " a drowsy driving crash or near miss) should be warned not to drive until therapy has been instituted and proven effective.

## 2020-08-17 ENCOUNTER — TELEPHONE (OUTPATIENT)
Dept: PULMONOLOGY | Facility: CLINIC | Age: 35
End: 2020-08-17

## 2020-08-21 ENCOUNTER — LAB VISIT (OUTPATIENT)
Dept: LAB | Facility: HOSPITAL | Age: 35
End: 2020-08-21
Attending: NURSE PRACTITIONER
Payer: COMMERCIAL

## 2020-08-21 ENCOUNTER — OFFICE VISIT (OUTPATIENT)
Dept: OBSTETRICS AND GYNECOLOGY | Facility: CLINIC | Age: 35
End: 2020-08-21
Payer: COMMERCIAL

## 2020-08-21 VITALS
SYSTOLIC BLOOD PRESSURE: 140 MMHG | WEIGHT: 256.38 LBS | DIASTOLIC BLOOD PRESSURE: 70 MMHG | BODY MASS INDEX: 45.42 KG/M2

## 2020-08-21 DIAGNOSIS — Z11.3 SCREEN FOR STD (SEXUALLY TRANSMITTED DISEASE): ICD-10-CM

## 2020-08-21 DIAGNOSIS — B96.89 BV (BACTERIAL VAGINOSIS): ICD-10-CM

## 2020-08-21 DIAGNOSIS — N76.0 BV (BACTERIAL VAGINOSIS): ICD-10-CM

## 2020-08-21 DIAGNOSIS — E66.01 MORBID OBESITY WITH BMI OF 40.0-44.9, ADULT: ICD-10-CM

## 2020-08-21 DIAGNOSIS — Z01.419 GYNECOLOGIC EXAM NORMAL: ICD-10-CM

## 2020-08-21 DIAGNOSIS — Z30.431 IUD CHECK UP: Primary | ICD-10-CM

## 2020-08-21 DIAGNOSIS — Z00.00 PREVENTATIVE HEALTH CARE: ICD-10-CM

## 2020-08-21 PROCEDURE — 36415 COLL VENOUS BLD VENIPUNCTURE: CPT

## 2020-08-21 PROCEDURE — 86696 HERPES SIMPLEX TYPE 2 TEST: CPT

## 2020-08-21 PROCEDURE — 99999 PR PBB SHADOW E&M-EST. PATIENT-LVL II: CPT | Mod: PBBFAC,,, | Performed by: NURSE PRACTITIONER

## 2020-08-21 PROCEDURE — 86592 SYPHILIS TEST NON-TREP QUAL: CPT

## 2020-08-21 PROCEDURE — 99395 PREV VISIT EST AGE 18-39: CPT | Mod: S$GLB,,, | Performed by: NURSE PRACTITIONER

## 2020-08-21 PROCEDURE — 99999 PR PBB SHADOW E&M-EST. PATIENT-LVL II: ICD-10-PCS | Mod: PBBFAC,,, | Performed by: NURSE PRACTITIONER

## 2020-08-21 PROCEDURE — 87491 CHLMYD TRACH DNA AMP PROBE: CPT

## 2020-08-21 PROCEDURE — 99395 PR PREVENTIVE VISIT,EST,18-39: ICD-10-PCS | Mod: S$GLB,,, | Performed by: NURSE PRACTITIONER

## 2020-08-21 PROCEDURE — 86703 HIV-1/HIV-2 1 RESULT ANTBDY: CPT

## 2020-08-21 PROCEDURE — 80074 ACUTE HEPATITIS PANEL: CPT

## 2020-08-21 RX ORDER — METRONIDAZOLE 500 MG/1
500 TABLET ORAL 2 TIMES DAILY
Qty: 14 TABLET | Refills: 0 | Status: SHIPPED | OUTPATIENT
Start: 2020-08-21 | End: 2020-08-27

## 2020-08-21 NOTE — PROGRESS NOTES
CC: Well woman exam    Ralph Hughes is a 35 y.o. female  presents for well woman exam. Patient is s/p BTL. Has IUD/Mirena for heavy cycles. Last pap exam was normal. C/O vaginal discharge. No OTC meds used. No pelvic pain. Is sexually active. H/O HSV, no reported outbreaks.     Past Medical History:   Diagnosis Date    Herpes simplex virus (HSV) infection      Past Surgical History:   Procedure Laterality Date     SECTION      x3    Ectopic Pregnacy Removal  10/13/2016    TUBAL LIGATION      bilateral salpingectomy     Social History     Socioeconomic History    Marital status:      Spouse name: Not on file    Number of children: Not on file    Years of education: Not on file    Highest education level: Not on file   Occupational History     Employer: [employer]   Social Needs    Financial resource strain: Somewhat hard    Food insecurity     Worry: Sometimes true     Inability: Sometimes true    Transportation needs     Medical: No     Non-medical: No   Tobacco Use    Smoking status: Never Smoker    Smokeless tobacco: Never Used   Substance and Sexual Activity    Alcohol use: Not Currently     Frequency: Monthly or less     Drinks per session: 1 or 2     Binge frequency: Never     Comment: occasionally    Drug use: No    Sexual activity: Yes     Partners: Male     Birth control/protection: Surgical, I.U.D.     Comment: Mirena    Lifestyle    Physical activity     Days per week: 3 days     Minutes per session: 30 min    Stress: Only a little   Relationships    Social connections     Talks on phone: Three times a week     Gets together: Once a week     Attends Gnosticist service: Not on file     Active member of club or organization: No     Attends meetings of clubs or organizations: Not on file     Relationship status:    Other Topics Concern    Not on file   Social History Narrative    She works at [employer] as a .      Family History   Problem Relation Age of Onset    Hypertension Mother     Cancer Mother         lung    Breast cancer Neg Hx     Colon cancer Neg Hx     Ovarian cancer Neg Hx     Thrombophilia Neg Hx      OB History        4    Para   3    Term   2       1    AB   1    Living   3       SAB   0    TAB   0    Ectopic   1    Multiple   0    Live Births   3                 BP (!) 140/70   Wt 116.3 kg (256 lb 6.3 oz)   BMI 45.42 kg/m²       ROS:  GENERAL: Denies weight gain or weight loss. Feeling well overall.   SKIN: Denies rash or lesions.   HEAD: Denies head injury or headache.   NODES: Denies enlarged lymph nodes.   CHEST: Denies chest pain or shortness of breath.   CARDIOVASCULAR: Denies palpitations or left sided chest pain.   ABDOMEN: No abdominal pain, constipation, diarrhea, nausea, vomiting or rectal bleeding.   URINARY: No frequency, dysuria, hematuria, or burning on urination.  REPRODUCTIVE: See HPI.   BREASTS: The patient performs breast self-examination and denies pain, lumps, or nipple discharge.   HEMATOLOGIC: No easy bruisability or excessive bleeding.   MUSCULOSKELETAL: Denies joint pain or swelling.   NEUROLOGIC: Denies syncope or weakness.   PSYCHIATRIC: Denies depression, anxiety or mood swings.    PHYSICAL EXAM:  APPEARANCE: Obese AA female, in no acute distress.  AFFECT: WNL, alert and oriented x 3  SKIN: No acne or hirsutism  NECK: Neck symmetric without masses or thyromegaly  NODES: No inguinal, cervical, axillary, or femoral lymph node enlargement  CHEST: Good respiratory effect  ABDOMEN: Soft.  No tenderness or masses.  No hepatosplenomegaly.  No hernias.  BREASTS: Symmetrical, no skin changes or visible lesions.  No palpable masses, nipple discharge bilaterally.  PELVIC: Normal external genitalia without lesions.  Normal hair distribution.  Adequate perineal body, normal urethral meatus.  Vagina moist and well rugated without lesions or discharge.  Cervix pink,  without lesions, discharge or tenderness,IUD strings seen with exam  No significant cystocele or rectocele.  Bimanual exam shows uterus to be normal size, regular, mobile and nontender.  Adnexa without masses or tenderness.    EXTREMITIES: No edema.     PLAN:  STD assessment per patient request  Wet prep:> 50% clue cells   Flagyl rx   Patient was counseled today on A.C.S. Pap guidelines and recommendations for yearly pelvic exams, mammograms and monthly self breast exams; to see her PCP for other health maintenance.

## 2020-08-22 LAB — RPR SER QL: NORMAL

## 2020-08-24 LAB
HAV IGM SERPL QL IA: NEGATIVE
HBV CORE IGM SERPL QL IA: NEGATIVE
HBV SURFACE AG SERPL QL IA: NEGATIVE
HCV AB SERPL QL IA: NEGATIVE
HIV 1+2 AB+HIV1 P24 AG SERPL QL IA: NEGATIVE

## 2020-08-25 LAB
HSV1 IGG SERPL QL IA: POSITIVE
HSV2 IGG SERPL QL IA: NEGATIVE

## 2020-08-27 NOTE — PROGRESS NOTES
Subjective:       Patient ID: Ralph Hughes is a 35 y.o. female.    Chief Complaint   Patient presents with    Annual Exam         HPI    Ralph Hughes is here today for an annual wellness exam.  I have reviewed the patient's medical history in detail and updated the computerized patient record.      HEALTH MAINTENANCE:   Pap smear --- due 8/16/2022   Flu shot        Review of Systems   Constitutional: Negative for activity change, appetite change, chills, diaphoresis, fatigue, fever and unexpected weight change.   HENT: Negative for nasal congestion, mouth sores and tinnitus.    Eyes: Negative for discharge and visual disturbance.   Respiratory: Negative for cough, shortness of breath and wheezing.         Sleep study planned in 2 weeks, followed by Pulmonary due to sleep issues.   Cardiovascular: Negative for chest pain, palpitations and leg swelling.   Gastrointestinal: Negative for abdominal pain, bloating, blood in stool, constipation, diarrhea, nausea, vomiting and reflux.   Endocrine: Negative.    Genitourinary: Negative.    Musculoskeletal: Positive for back pain and leg pain. Negative for arthralgias.        Reports problem with sciatic nerve for past few weeks on left side.  Usually only with prolonged sitting.  Feeling fine today, no pain.  Treating with Motrin 600 mg prn pain.   Integumentary:  Negative for rash. Negative.   Neurological: Negative for vertigo, seizures, syncope, numbness and headaches.   Hematological: Negative.    Psychiatric/Behavioral: Negative for depression and sleep disturbance. The patient is not nervous/anxious.    Breast: negative.           Review of patient's allergies indicates:  No Known Allergies      Patient Active Problem List   Diagnosis    Asthma in adult without complication    HSV-2 (herpes simplex virus 2) infection    Obesity    Prediabetes         Current Outpatient Medications:     diclofenac sodium (VOLTAREN) 1 % Gel, Apply 2 grams topically  "4 (four) times daily., Disp: 100 g, Rfl: 5    levonorgestreL (MIRENA) 20 mcg/24 hours (5 yrs) 52 mg IUD, 1 each by Intrauterine route once., Disp: , Rfl:     albuterol (VENTOLIN HFA) 90 mcg/actuation inhaler, Inhale 2 puffs into the lungs every 6 (six) hours as needed for Wheezing. Rescue, Disp: 18 g, Rfl: 0    valACYclovir (VALTREX) 500 MG tablet, Take 1 tablet (500 mg total) by mouth 2 (two) times daily. (Patient taking differently: Take 500 mg by mouth as needed. ), Disp: 60 tablet, Rfl: 2      Past Medical History:   Diagnosis Date    Herpes simplex virus (HSV) infection        Past Surgical History:   Procedure Laterality Date     SECTION      x3    Ectopic Pregnacy Removal  10/13/2016    TUBAL LIGATION      bilateral salpingectomy       Family History   Problem Relation Age of Onset    Hypertension Mother     Cancer Mother         lung    Breast cancer Neg Hx     Colon cancer Neg Hx     Ovarian cancer Neg Hx     Thrombophilia Neg Hx        Social History     Tobacco Use    Smoking status: Never Smoker    Smokeless tobacco: Never Used   Substance Use Topics    Alcohol use: Not Currently     Frequency: Monthly or less     Drinks per session: 1 or 2     Binge frequency: Never     Comment: occasionally    Drug use: No           Objective:     Vitals:    20 1150   BP: 110/70   Pulse: 101   Temp: 98.6 °F (37 °C)   TempSrc: Oral   Weight: 115.6 kg (254 lb 13.6 oz)   Height: 5' 2" (1.575 m)       Estimated body mass index is 45.42 kg/m² as calculated from the following:    Height as of 20: 5' 3" (1.6 m).    Weight as of 20: 116.3 kg (256 lb 6.3 oz).      Physical Exam  Constitutional:       General: She is not in acute distress.     Appearance: She is well-developed. She is not diaphoretic.   HENT:      Head: Normocephalic and atraumatic.      Right Ear: Tympanic membrane, ear canal and external ear normal. There is no impacted cerumen.      Left Ear: Tympanic membrane, ear " canal and external ear normal. There is no impacted cerumen.      Nose: Nose normal. No nasal deformity, mucosal edema, congestion, rhinorrhea or epistaxis.      Mouth/Throat:      Mouth: Mucous membranes are moist. Mucous membranes are not dry and not cyanotic. No oral lesions.      Dentition: Normal dentition.      Pharynx: Oropharynx is clear. Uvula midline. No oropharyngeal exudate, posterior oropharyngeal erythema or uvula swelling.      Tonsils: No tonsillar abscesses.   Eyes:      General: Lids are normal. Lids are everted, no foreign bodies appreciated. No scleral icterus.        Right eye: No discharge.         Left eye: No discharge.      Conjunctiva/sclera: Conjunctivae normal.      Right eye: Right conjunctiva is not injected. No exudate.     Left eye: Left conjunctiva is not injected. No exudate.     Pupils: Pupils are equal, round, and reactive to light.   Neck:      Musculoskeletal: Normal range of motion and neck supple. Normal range of motion. No edema or erythema.      Thyroid: No thyroid mass or thyromegaly.      Vascular: No carotid bruit or JVD.      Trachea: No tracheal deviation.   Cardiovascular:      Rate and Rhythm: Normal rate and regular rhythm.      Pulses: Normal pulses.      Heart sounds: Normal heart sounds. No murmur. No friction rub. No gallop.    Pulmonary:      Effort: Pulmonary effort is normal. No respiratory distress.      Breath sounds: Normal breath sounds. No stridor. No wheezing.   Chest:      Chest wall: No tenderness.   Abdominal:      General: Bowel sounds are normal. There is no distension.      Palpations: Abdomen is soft. There is no mass.      Tenderness: There is no abdominal tenderness. There is no guarding or rebound.   Genitourinary:     Comments: Deferred to Gynecology  Musculoskeletal: Normal range of motion.         General: No swelling, tenderness or deformity.   Lymphadenopathy:      Cervical: No cervical adenopathy.   Skin:     General: Skin is warm and  dry.      Capillary Refill: Capillary refill takes less than 2 seconds.      Coloration: Skin is not pale.      Findings: No bruising, erythema or rash.   Neurological:      Mental Status: She is alert and oriented to person, place, and time.      Cranial Nerves: No cranial nerve deficit.      Sensory: No sensory deficit.      Motor: No weakness, tremor, atrophy or abnormal muscle tone.      Coordination: Coordination normal.      Gait: Gait normal.      Deep Tendon Reflexes: Reflexes are normal and symmetric.   Psychiatric:         Mood and Affect: Mood normal.         Speech: Speech normal.         Behavior: Behavior normal.         Thought Content: Thought content normal.         Cognition and Memory: Memory is not impaired.         Judgment: Judgment normal.           Diagnosis       1. Preventative health care    2. Prediabetes    3. Asthma in adult without complication, unspecified asthma severity, unspecified whether persistent    4. HSV-2 (herpes simplex virus 2) infection    5. Left-sided low back pain with left-sided sciatica, unspecified chronicity    6. Morbid obesity with BMI of 45.0-49.9, adult          Assessment/ Plan     Preventative health care --- HCM discussed.  Pap due 8/2022, followed by Gynecology.  Flu shot this Fall.  -     CBC auto differential; Future; Expected date: 08/28/2020  -     Comprehensive metabolic panel; Future; Expected date: 08/28/2020  -     TSH; Future; Expected date: 08/28/2020  -     Lipid Panel; Future; Expected date: 08/28/2020  -     Hemoglobin A1C; Future; Expected date: 08/28/2020    Prediabetes --- low-carb/starch diet, regular exercise, healthy lifestyle changes, weight reduction.  -     CBC auto differential; Future; Expected date: 08/28/2020  -     Comprehensive metabolic panel; Future; Expected date: 08/28/2020  -     TSH; Future; Expected date: 08/28/2020  -     Lipid Panel; Future; Expected date: 08/28/2020  -     Hemoglobin A1C; Future; Expected date:  08/28/2020    Asthma in adult without complication, unspecified asthma severity, unspecified whether persistent --- well-controlled, med refilled.  -     albuterol (VENTOLIN HFA) 90 mcg/actuation inhaler; Inhale 1-2 puffs into the lungs every 4 to 6 hours as needed for Wheezing or Shortness of Breath. Rescue  Dispense: 18 g; Refill: 2    HSV-2 (herpes simplex virus 2) infection --- stable, Valtrex prn.    Left-sided low back pain with left-sided sciatica, unspecified chronicity --- ice/heat, rest, stretching exercises.  Avoid prolonged sitting or standing.  -     ibuprofen (ADVIL,MOTRIN) 800 MG tablet; Take 1 tablet (800 mg total) by mouth 3 (three) times daily as needed for Pain (take with food).  Dispense: 60 tablet; Refill: 0    Morbid obesity with BMI of 45.0-49.9, adult --- healthy diet, exercise, weight loss.  -     CBC auto differential; Future; Expected date: 08/28/2020  -     Comprehensive metabolic panel; Future; Expected date: 08/28/2020  -     TSH; Future; Expected date: 08/28/2020  -     Lipid Panel; Future; Expected date: 08/28/2020  -     Hemoglobin A1C; Future; Expected date: 08/28/2020          Follow-up:  Lab pending.  Follow-up prn.      Patient Care Team:  Primary Doctor No as PCP - General  Ridge Quan MD as PCP - Internal Medicine (Internal Medicine)        KEVIN Lake  [facility]

## 2020-08-28 ENCOUNTER — OFFICE VISIT (OUTPATIENT)
Dept: FAMILY MEDICINE | Facility: CLINIC | Age: 35
End: 2020-08-28
Payer: COMMERCIAL

## 2020-08-28 VITALS
HEIGHT: 62 IN | BODY MASS INDEX: 46.9 KG/M2 | SYSTOLIC BLOOD PRESSURE: 110 MMHG | DIASTOLIC BLOOD PRESSURE: 70 MMHG | TEMPERATURE: 99 F | WEIGHT: 254.88 LBS | HEART RATE: 101 BPM

## 2020-08-28 DIAGNOSIS — Z00.00 PREVENTATIVE HEALTH CARE: Primary | ICD-10-CM

## 2020-08-28 DIAGNOSIS — E66.01 MORBID OBESITY WITH BMI OF 45.0-49.9, ADULT: ICD-10-CM

## 2020-08-28 DIAGNOSIS — R73.03 PREDIABETES: ICD-10-CM

## 2020-08-28 DIAGNOSIS — B00.9 HSV-2 (HERPES SIMPLEX VIRUS 2) INFECTION: ICD-10-CM

## 2020-08-28 DIAGNOSIS — J45.909 ASTHMA IN ADULT WITHOUT COMPLICATION, UNSPECIFIED ASTHMA SEVERITY, UNSPECIFIED WHETHER PERSISTENT: ICD-10-CM

## 2020-08-28 DIAGNOSIS — M54.42 LEFT-SIDED LOW BACK PAIN WITH LEFT-SIDED SCIATICA, UNSPECIFIED CHRONICITY: ICD-10-CM

## 2020-08-28 PROCEDURE — 99999 PR PBB SHADOW E&M-EST. PATIENT-LVL III: CPT | Mod: PBBFAC,,, | Performed by: REGISTERED NURSE

## 2020-08-28 PROCEDURE — 99395 PR PREVENTIVE VISIT,EST,18-39: ICD-10-PCS | Mod: S$GLB,,, | Performed by: REGISTERED NURSE

## 2020-08-28 PROCEDURE — 99395 PREV VISIT EST AGE 18-39: CPT | Mod: S$GLB,,, | Performed by: REGISTERED NURSE

## 2020-08-28 PROCEDURE — 99999 PR PBB SHADOW E&M-EST. PATIENT-LVL III: ICD-10-PCS | Mod: PBBFAC,,, | Performed by: REGISTERED NURSE

## 2020-08-28 RX ORDER — IBUPROFEN 800 MG/1
800 TABLET ORAL 3 TIMES DAILY PRN
Qty: 60 TABLET | Refills: 0 | Status: SHIPPED | OUTPATIENT
Start: 2020-08-28 | End: 2020-10-05

## 2020-08-28 RX ORDER — ALBUTEROL SULFATE 90 UG/1
1-2 AEROSOL, METERED RESPIRATORY (INHALATION)
Qty: 18 G | Refills: 2 | Status: SHIPPED | OUTPATIENT
Start: 2020-08-28 | End: 2021-08-02 | Stop reason: SDUPTHER

## 2020-09-04 ENCOUNTER — LAB VISIT (OUTPATIENT)
Dept: LAB | Facility: HOSPITAL | Age: 35
End: 2020-09-04
Attending: REGISTERED NURSE
Payer: COMMERCIAL

## 2020-09-04 DIAGNOSIS — E66.01 MORBID OBESITY WITH BMI OF 45.0-49.9, ADULT: ICD-10-CM

## 2020-09-04 DIAGNOSIS — R73.03 PREDIABETES: ICD-10-CM

## 2020-09-04 DIAGNOSIS — Z00.00 PREVENTATIVE HEALTH CARE: ICD-10-CM

## 2020-09-04 LAB
ALBUMIN SERPL BCP-MCNC: 3.7 G/DL (ref 3.5–5.2)
ALP SERPL-CCNC: 79 U/L (ref 55–135)
ALT SERPL W/O P-5'-P-CCNC: 12 U/L (ref 10–44)
ANION GAP SERPL CALC-SCNC: 6 MMOL/L (ref 8–16)
AST SERPL-CCNC: 12 U/L (ref 10–40)
BASOPHILS # BLD AUTO: 0.01 K/UL (ref 0–0.2)
BASOPHILS NFR BLD: 0.2 % (ref 0–1.9)
BILIRUB SERPL-MCNC: 0.4 MG/DL (ref 0.1–1)
BUN SERPL-MCNC: 12 MG/DL (ref 6–20)
CALCIUM SERPL-MCNC: 9 MG/DL (ref 8.7–10.5)
CHLORIDE SERPL-SCNC: 101 MMOL/L (ref 95–110)
CHOLEST SERPL-MCNC: 155 MG/DL (ref 120–199)
CHOLEST/HDLC SERPL: 4.3 {RATIO} (ref 2–5)
CO2 SERPL-SCNC: 28 MMOL/L (ref 23–29)
CREAT SERPL-MCNC: 0.8 MG/DL (ref 0.5–1.4)
DIFFERENTIAL METHOD: ABNORMAL
EOSINOPHIL # BLD AUTO: 0 K/UL (ref 0–0.5)
EOSINOPHIL NFR BLD: 0.9 % (ref 0–8)
ERYTHROCYTE [DISTWIDTH] IN BLOOD BY AUTOMATED COUNT: 15.7 % (ref 11.5–14.5)
EST. GFR  (AFRICAN AMERICAN): >60 ML/MIN/1.73 M^2
EST. GFR  (NON AFRICAN AMERICAN): >60 ML/MIN/1.73 M^2
GLUCOSE SERPL-MCNC: 77 MG/DL (ref 70–110)
HCT VFR BLD AUTO: 38.6 % (ref 37–48.5)
HDLC SERPL-MCNC: 36 MG/DL (ref 40–75)
HDLC SERPL: 23.2 % (ref 20–50)
HGB BLD-MCNC: 11.9 G/DL (ref 12–16)
IMM GRANULOCYTES # BLD AUTO: 0.02 K/UL (ref 0–0.04)
IMM GRANULOCYTES NFR BLD AUTO: 0.4 % (ref 0–0.5)
LDLC SERPL CALC-MCNC: 110 MG/DL (ref 63–159)
LYMPHOCYTES # BLD AUTO: 1.5 K/UL (ref 1–4.8)
LYMPHOCYTES NFR BLD: 32.6 % (ref 18–48)
MCH RBC QN AUTO: 27 PG (ref 27–31)
MCHC RBC AUTO-ENTMCNC: 30.8 G/DL (ref 32–36)
MCV RBC AUTO: 88 FL (ref 82–98)
MONOCYTES # BLD AUTO: 0.6 K/UL (ref 0.3–1)
MONOCYTES NFR BLD: 12.2 % (ref 4–15)
NEUTROPHILS # BLD AUTO: 2.4 K/UL (ref 1.8–7.7)
NEUTROPHILS NFR BLD: 53.7 % (ref 38–73)
NONHDLC SERPL-MCNC: 119 MG/DL
NRBC BLD-RTO: 0 /100 WBC
PLATELET # BLD AUTO: 247 K/UL (ref 150–350)
PMV BLD AUTO: 12.6 FL (ref 9.2–12.9)
POTASSIUM SERPL-SCNC: 4.1 MMOL/L (ref 3.5–5.1)
PROT SERPL-MCNC: 7.8 G/DL (ref 6–8.4)
RBC # BLD AUTO: 4.41 M/UL (ref 4–5.4)
SODIUM SERPL-SCNC: 135 MMOL/L (ref 136–145)
TRIGL SERPL-MCNC: 45 MG/DL (ref 30–150)
TSH SERPL DL<=0.005 MIU/L-ACNC: 0.43 UIU/ML (ref 0.4–4)
WBC # BLD AUTO: 4.51 K/UL (ref 3.9–12.7)

## 2020-09-04 PROCEDURE — 84443 ASSAY THYROID STIM HORMONE: CPT

## 2020-09-04 PROCEDURE — 83036 HEMOGLOBIN GLYCOSYLATED A1C: CPT

## 2020-09-04 PROCEDURE — 36415 COLL VENOUS BLD VENIPUNCTURE: CPT | Mod: PO

## 2020-09-04 PROCEDURE — 85025 COMPLETE CBC W/AUTO DIFF WBC: CPT

## 2020-09-04 PROCEDURE — 80061 LIPID PANEL: CPT

## 2020-09-04 PROCEDURE — 80053 COMPREHEN METABOLIC PANEL: CPT

## 2020-09-05 LAB
ESTIMATED AVG GLUCOSE: 131 MG/DL (ref 68–131)
HBA1C MFR BLD HPLC: 6.2 % (ref 4–5.6)

## 2020-09-12 LAB
C TRACH DNA SPEC QL NAA+PROBE: NOT DETECTED
N GONORRHOEA DNA SPEC QL NAA+PROBE: NOT DETECTED

## 2020-09-15 ENCOUNTER — PROCEDURE VISIT (OUTPATIENT)
Dept: SLEEP MEDICINE | Facility: CLINIC | Age: 35
End: 2020-09-15
Payer: COMMERCIAL

## 2020-09-15 DIAGNOSIS — R40.0 HAS DAYTIME DROWSINESS: ICD-10-CM

## 2020-09-15 DIAGNOSIS — G47.19 EXCESSIVE DAYTIME SLEEPINESS: ICD-10-CM

## 2020-09-15 DIAGNOSIS — G47.8 NON-RESTORATIVE SLEEP: ICD-10-CM

## 2020-09-15 DIAGNOSIS — R06.83 SNORING: ICD-10-CM

## 2020-09-15 DIAGNOSIS — R29.818 SUSPECTED SLEEP APNEA: ICD-10-CM

## 2020-09-15 DIAGNOSIS — G47.33 OSA (OBSTRUCTIVE SLEEP APNEA): Primary | ICD-10-CM

## 2020-09-15 PROCEDURE — 95800 PR SLEEP STUDY, UNATTENDED, RECORD HEART RATE/O2 SAT/RESP ANAL/SLEEP TIME: ICD-10-PCS | Mod: 26,,, | Performed by: INTERNAL MEDICINE

## 2020-09-15 PROCEDURE — 95800 SLP STDY UNATTENDED: CPT | Mod: 26,,, | Performed by: INTERNAL MEDICINE

## 2020-09-15 PROCEDURE — 95800 SLP STDY UNATTENDED: CPT

## 2020-09-15 NOTE — PROCEDURES
Home Sleep Studies    Date/Time: 9/15/2020 9:30 AM  Performed by: Roger Dawkins MD  Authorized by: Latrell Sanchez MD       PHYSICIAN INTERPRETATION AND COMMENTS: Findings are consistent with severe, positional obstructive sleep apnea  (THERON). There is insufficient non-supine study time to assess the severity of non-positional obstructive sleep apnea (THERON). overall  AHI was 40.0 events per hour. Saturation stevenson was 44.0%. Oxygen saturation was below 90% for 31.3% of the study time.  Significant nocturnal hypoxemia is detected. In-lab CPAP titration is indicated. Significant weight loss also recommended. auto PAP  6-20 cm water pressure may be considered. Close follow-up to ensure resolution of nocturnal hypoxemia. close follow-up to ensure  resolution of daytime sleepiness. [place] Sleepiness Score was recorded as 24.  CLINICAL HISTORY: 35 year old female presented with: 17 inch neck, BMI of 47, an [place] sleepiness score of 24, history of  depression and symptoms of nocturnal snoring, waking up choking and witnessed apneas. Based on the clinical history, the patient  has a high pre-test probability of having severe THERON. Stop Bang score 5.

## 2020-09-15 NOTE — Clinical Note
PHYSICIAN INTERPRETATION AND COMMENTS: Findings are consistent with severe, positional obstructive sleep apnea  (THERON). There is insufficient non-supine study time to assess the severity of non-positional obstructive sleep apnea (THERON). overall  AHI was 40.0 events per hour. Saturation stevenson was 44.0%. Oxygen saturation was below 90% for 31.3% of the study time.  Significant nocturnal hypoxemia is detected. In-lab CPAP titration is indicated. Significant weight loss also recommended. auto PAP  6-20 cm water pressure may be considered. Close follow-up to ensure resolution of nocturnal hypoxemia. close follow-up to ensure  resolution of daytime sleepiness. [place] Sleepiness Score was recorded as 24.  CLINICAL HISTORY: 35 year old female presented with: 17 inch neck, BMI of 47, an [place] sleepiness score of 24, history of

## 2020-09-16 ENCOUNTER — PATIENT MESSAGE (OUTPATIENT)
Dept: PULMONOLOGY | Facility: CLINIC | Age: 35
End: 2020-09-16

## 2020-09-16 DIAGNOSIS — G47.33 SEVERE OBSTRUCTIVE SLEEP APNEA: Primary | ICD-10-CM

## 2020-11-19 ENCOUNTER — PATIENT MESSAGE (OUTPATIENT)
Dept: FAMILY MEDICINE | Facility: CLINIC | Age: 35
End: 2020-11-19

## 2020-11-20 ENCOUNTER — TELEPHONE (OUTPATIENT)
Dept: FAMILY MEDICINE | Facility: CLINIC | Age: 35
End: 2020-11-20

## 2020-11-20 NOTE — TELEPHONE ENCOUNTER
Pt reports she went to UC last night and ekg was done, normal. States she was told to follow up with primary care within 2 weeks. Pt scheduled for f/u visit.

## 2020-11-20 NOTE — TELEPHONE ENCOUNTER
----- Message from Tiara Sparks sent at 11/20/2020 12:00 PM CST -----  Would like to consult with nurse to verify if morning appt was still available on 11/27/20, please give a call back at [phone].

## 2020-12-04 ENCOUNTER — TELEPHONE (OUTPATIENT)
Dept: PULMONOLOGY | Facility: CLINIC | Age: 35
End: 2020-12-04

## 2021-03-02 ENCOUNTER — PATIENT MESSAGE (OUTPATIENT)
Dept: FAMILY MEDICINE | Facility: CLINIC | Age: 36
End: 2021-03-02

## 2021-03-03 DIAGNOSIS — R73.03 PREDIABETES: Primary | ICD-10-CM

## 2021-03-15 ENCOUNTER — LAB VISIT (OUTPATIENT)
Dept: LAB | Facility: HOSPITAL | Age: 36
End: 2021-03-15
Attending: REGISTERED NURSE
Payer: COMMERCIAL

## 2021-03-15 DIAGNOSIS — R73.03 PREDIABETES: ICD-10-CM

## 2021-03-15 LAB
ESTIMATED AVG GLUCOSE: 140 MG/DL (ref 68–131)
HBA1C MFR BLD: 6.5 % (ref 4–5.6)

## 2021-03-15 PROCEDURE — 83036 HEMOGLOBIN GLYCOSYLATED A1C: CPT | Performed by: REGISTERED NURSE

## 2021-03-15 PROCEDURE — 36415 COLL VENOUS BLD VENIPUNCTURE: CPT | Mod: PO | Performed by: REGISTERED NURSE

## 2021-03-16 ENCOUNTER — PATIENT MESSAGE (OUTPATIENT)
Dept: FAMILY MEDICINE | Facility: CLINIC | Age: 36
End: 2021-03-16

## 2021-04-05 ENCOUNTER — PATIENT MESSAGE (OUTPATIENT)
Dept: FAMILY MEDICINE | Facility: CLINIC | Age: 36
End: 2021-04-05

## 2021-04-20 ENCOUNTER — PATIENT MESSAGE (OUTPATIENT)
Dept: PULMONOLOGY | Facility: CLINIC | Age: 36
End: 2021-04-20

## 2021-06-24 ENCOUNTER — PATIENT MESSAGE (OUTPATIENT)
Dept: PULMONOLOGY | Facility: CLINIC | Age: 36
End: 2021-06-24

## 2021-06-24 ENCOUNTER — TELEPHONE (OUTPATIENT)
Dept: PULMONOLOGY | Facility: CLINIC | Age: 36
End: 2021-06-24

## 2021-06-24 ENCOUNTER — OFFICE VISIT (OUTPATIENT)
Dept: PULMONOLOGY | Facility: CLINIC | Age: 36
End: 2021-06-24
Payer: COMMERCIAL

## 2021-06-24 VITALS — BODY MASS INDEX: 46.74 KG/M2 | HEIGHT: 62 IN | WEIGHT: 254 LBS

## 2021-06-24 DIAGNOSIS — G47.8 NON-RESTORATIVE SLEEP: ICD-10-CM

## 2021-06-24 DIAGNOSIS — E66.01 CLASS 3 SEVERE OBESITY WITH BODY MASS INDEX (BMI) OF 45.0 TO 49.9 IN ADULT, UNSPECIFIED OBESITY TYPE, UNSPECIFIED WHETHER SERIOUS COMORBIDITY PRESENT: Primary | ICD-10-CM

## 2021-06-24 DIAGNOSIS — G47.33 OSA (OBSTRUCTIVE SLEEP APNEA): ICD-10-CM

## 2021-06-24 DIAGNOSIS — G47.19 EXCESSIVE DAYTIME SLEEPINESS: ICD-10-CM

## 2021-06-24 PROCEDURE — 99213 PR OFFICE/OUTPT VISIT, EST, LEVL III, 20-29 MIN: ICD-10-PCS | Mod: 95,,, | Performed by: NURSE PRACTITIONER

## 2021-06-24 PROCEDURE — 99213 OFFICE O/P EST LOW 20 MIN: CPT | Mod: 95,,, | Performed by: NURSE PRACTITIONER

## 2021-08-11 ENCOUNTER — PATIENT MESSAGE (OUTPATIENT)
Dept: FAMILY MEDICINE | Facility: CLINIC | Age: 36
End: 2021-08-11

## 2021-08-11 DIAGNOSIS — E55.9 VITAMIN D DEFICIENCY: Primary | ICD-10-CM

## 2021-09-07 ENCOUNTER — TELEPHONE (OUTPATIENT)
Dept: PULMONOLOGY | Facility: CLINIC | Age: 36
End: 2021-09-07

## 2021-10-13 ENCOUNTER — TELEPHONE (OUTPATIENT)
Dept: FAMILY MEDICINE | Facility: CLINIC | Age: 36
End: 2021-10-13

## 2021-11-17 ENCOUNTER — PATIENT MESSAGE (OUTPATIENT)
Dept: FAMILY MEDICINE | Facility: CLINIC | Age: 36
End: 2021-11-17
Payer: MEDICAID

## 2021-11-18 DIAGNOSIS — Z71.89 ENCOUNTER FOR PRE-BARIATRIC SURGERY COUNSELING AND EDUCATION: Primary | ICD-10-CM

## 2021-12-10 ENCOUNTER — LAB VISIT (OUTPATIENT)
Dept: LAB | Facility: HOSPITAL | Age: 36
End: 2021-12-10
Attending: NURSE PRACTITIONER
Payer: MEDICAID

## 2021-12-10 ENCOUNTER — OFFICE VISIT (OUTPATIENT)
Dept: OBSTETRICS AND GYNECOLOGY | Facility: CLINIC | Age: 36
End: 2021-12-10
Payer: MEDICAID

## 2021-12-10 VITALS
SYSTOLIC BLOOD PRESSURE: 112 MMHG | WEIGHT: 269.38 LBS | BODY MASS INDEX: 49.27 KG/M2 | DIASTOLIC BLOOD PRESSURE: 64 MMHG

## 2021-12-10 DIAGNOSIS — Z11.3 SCREEN FOR STD (SEXUALLY TRANSMITTED DISEASE): ICD-10-CM

## 2021-12-10 DIAGNOSIS — Z01.419 WELL WOMAN EXAM WITH ROUTINE GYNECOLOGICAL EXAM: Primary | ICD-10-CM

## 2021-12-10 PROCEDURE — 36415 COLL VENOUS BLD VENIPUNCTURE: CPT | Performed by: NURSE PRACTITIONER

## 2021-12-10 PROCEDURE — 99395 PREV VISIT EST AGE 18-39: CPT | Mod: S$PBB,,, | Performed by: NURSE PRACTITIONER

## 2021-12-10 PROCEDURE — 87389 HIV-1 AG W/HIV-1&-2 AB AG IA: CPT | Performed by: NURSE PRACTITIONER

## 2021-12-10 PROCEDURE — 99213 OFFICE O/P EST LOW 20 MIN: CPT | Mod: PBBFAC | Performed by: NURSE PRACTITIONER

## 2021-12-10 PROCEDURE — 87491 CHLMYD TRACH DNA AMP PROBE: CPT | Performed by: NURSE PRACTITIONER

## 2021-12-10 PROCEDURE — 99395 PR PREVENTIVE VISIT,EST,18-39: ICD-10-PCS | Mod: S$PBB,,, | Performed by: NURSE PRACTITIONER

## 2021-12-10 PROCEDURE — 80074 ACUTE HEPATITIS PANEL: CPT | Performed by: NURSE PRACTITIONER

## 2021-12-10 PROCEDURE — 87591 N.GONORRHOEAE DNA AMP PROB: CPT | Performed by: NURSE PRACTITIONER

## 2021-12-10 PROCEDURE — 86592 SYPHILIS TEST NON-TREP QUAL: CPT | Performed by: NURSE PRACTITIONER

## 2021-12-10 PROCEDURE — 99999 PR PBB SHADOW E&M-EST. PATIENT-LVL III: CPT | Mod: PBBFAC,,, | Performed by: NURSE PRACTITIONER

## 2021-12-10 PROCEDURE — 99999 PR PBB SHADOW E&M-EST. PATIENT-LVL III: ICD-10-PCS | Mod: PBBFAC,,, | Performed by: NURSE PRACTITIONER

## 2021-12-11 LAB — RPR SER QL: NORMAL

## 2021-12-15 ENCOUNTER — PATIENT MESSAGE (OUTPATIENT)
Dept: SLEEP MEDICINE | Facility: CLINIC | Age: 36
End: 2021-12-15
Payer: MEDICAID

## 2021-12-16 ENCOUNTER — OFFICE VISIT (OUTPATIENT)
Dept: FAMILY MEDICINE | Facility: CLINIC | Age: 36
End: 2021-12-16
Payer: MEDICAID

## 2021-12-16 DIAGNOSIS — E66.01 MORBID OBESITY WITH BMI OF 45.0-49.9, ADULT: ICD-10-CM

## 2021-12-16 DIAGNOSIS — R63.8 UNABLE TO LOSE WEIGHT: ICD-10-CM

## 2021-12-16 DIAGNOSIS — G47.00 INSOMNIA, UNSPECIFIED TYPE: ICD-10-CM

## 2021-12-16 DIAGNOSIS — R73.03 PREDIABETES: Primary | ICD-10-CM

## 2021-12-16 DIAGNOSIS — R53.83 FATIGUE, UNSPECIFIED TYPE: ICD-10-CM

## 2021-12-16 DIAGNOSIS — L68.0 HIRSUTISM: ICD-10-CM

## 2021-12-16 LAB
C TRACH DNA SPEC QL NAA+PROBE: NOT DETECTED
N GONORRHOEA DNA SPEC QL NAA+PROBE: NOT DETECTED

## 2021-12-16 PROCEDURE — 99213 PR OFFICE/OUTPT VISIT, EST, LEVL III, 20-29 MIN: ICD-10-PCS | Mod: 95,,, | Performed by: REGISTERED NURSE

## 2021-12-16 PROCEDURE — 99213 OFFICE O/P EST LOW 20 MIN: CPT | Mod: 95,,, | Performed by: REGISTERED NURSE

## 2021-12-17 ENCOUNTER — PATIENT MESSAGE (OUTPATIENT)
Dept: PULMONOLOGY | Facility: CLINIC | Age: 36
End: 2021-12-17

## 2021-12-17 ENCOUNTER — LAB VISIT (OUTPATIENT)
Dept: LAB | Facility: HOSPITAL | Age: 36
End: 2021-12-17
Attending: REGISTERED NURSE
Payer: MEDICAID

## 2021-12-17 ENCOUNTER — OFFICE VISIT (OUTPATIENT)
Dept: PULMONOLOGY | Facility: CLINIC | Age: 36
End: 2021-12-17
Payer: MEDICAID

## 2021-12-17 VITALS
DIASTOLIC BLOOD PRESSURE: 62 MMHG | SYSTOLIC BLOOD PRESSURE: 122 MMHG | BODY MASS INDEX: 49.84 KG/M2 | RESPIRATION RATE: 16 BRPM | HEIGHT: 62 IN | HEART RATE: 92 BPM | OXYGEN SATURATION: 97 % | WEIGHT: 270.81 LBS

## 2021-12-17 DIAGNOSIS — G47.33 OSA ON CPAP: Primary | ICD-10-CM

## 2021-12-17 DIAGNOSIS — J45.909 ASTHMA IN ADULT WITHOUT COMPLICATION, UNSPECIFIED ASTHMA SEVERITY, UNSPECIFIED WHETHER PERSISTENT: ICD-10-CM

## 2021-12-17 DIAGNOSIS — L68.0 HIRSUTISM: ICD-10-CM

## 2021-12-17 DIAGNOSIS — R63.8 UNABLE TO LOSE WEIGHT: ICD-10-CM

## 2021-12-17 DIAGNOSIS — R53.83 FATIGUE, UNSPECIFIED TYPE: ICD-10-CM

## 2021-12-17 DIAGNOSIS — R73.03 PREDIABETES: ICD-10-CM

## 2021-12-17 DIAGNOSIS — E66.9 OBESITY, UNSPECIFIED CLASSIFICATION, UNSPECIFIED OBESITY TYPE, UNSPECIFIED WHETHER SERIOUS COMORBIDITY PRESENT: ICD-10-CM

## 2021-12-17 DIAGNOSIS — E66.01 MORBID OBESITY WITH BMI OF 45.0-49.9, ADULT: ICD-10-CM

## 2021-12-17 DIAGNOSIS — G47.00 INSOMNIA, UNSPECIFIED TYPE: ICD-10-CM

## 2021-12-17 LAB
25(OH)D3+25(OH)D2 SERPL-MCNC: 18 NG/ML (ref 30–96)
ESTIMATED AVG GLUCOSE: 146 MG/DL (ref 68–131)
FSH SERPL-ACNC: 4.52 MIU/ML
HBA1C MFR BLD: 6.7 % (ref 4–5.6)
T3FREE SERPL-MCNC: 2.6 PG/ML (ref 2.3–4.2)
T4 FREE SERPL-MCNC: 0.88 NG/DL (ref 0.71–1.51)
TESTOST SERPL-MCNC: 33 NG/DL (ref 5–73)
TSH SERPL DL<=0.005 MIU/L-ACNC: 1.48 UIU/ML (ref 0.4–4)
VIT B12 SERPL-MCNC: 365 PG/ML (ref 210–950)

## 2021-12-17 PROCEDURE — 82306 VITAMIN D 25 HYDROXY: CPT | Performed by: REGISTERED NURSE

## 2021-12-17 PROCEDURE — 99999 PR PBB SHADOW E&M-EST. PATIENT-LVL IV: CPT | Mod: PBBFAC,,, | Performed by: NURSE PRACTITIONER

## 2021-12-17 PROCEDURE — 99214 PR OFFICE/OUTPT VISIT, EST, LEVL IV, 30-39 MIN: ICD-10-PCS | Mod: S$PBB,,, | Performed by: NURSE PRACTITIONER

## 2021-12-17 PROCEDURE — 84481 FREE ASSAY (FT-3): CPT | Performed by: REGISTERED NURSE

## 2021-12-17 PROCEDURE — 84443 ASSAY THYROID STIM HORMONE: CPT | Performed by: REGISTERED NURSE

## 2021-12-17 PROCEDURE — 36415 COLL VENOUS BLD VENIPUNCTURE: CPT | Performed by: REGISTERED NURSE

## 2021-12-17 PROCEDURE — 84403 ASSAY OF TOTAL TESTOSTERONE: CPT | Performed by: REGISTERED NURSE

## 2021-12-17 PROCEDURE — 83001 ASSAY OF GONADOTROPIN (FSH): CPT | Performed by: REGISTERED NURSE

## 2021-12-17 PROCEDURE — 84439 ASSAY OF FREE THYROXINE: CPT | Performed by: REGISTERED NURSE

## 2021-12-17 PROCEDURE — 99214 OFFICE O/P EST MOD 30 MIN: CPT | Mod: PBBFAC | Performed by: NURSE PRACTITIONER

## 2021-12-17 PROCEDURE — 99214 OFFICE O/P EST MOD 30 MIN: CPT | Mod: S$PBB,,, | Performed by: NURSE PRACTITIONER

## 2021-12-17 PROCEDURE — 99999 PR PBB SHADOW E&M-EST. PATIENT-LVL IV: ICD-10-PCS | Mod: PBBFAC,,, | Performed by: NURSE PRACTITIONER

## 2021-12-17 PROCEDURE — 83036 HEMOGLOBIN GLYCOSYLATED A1C: CPT | Performed by: REGISTERED NURSE

## 2021-12-17 PROCEDURE — 82607 VITAMIN B-12: CPT | Performed by: REGISTERED NURSE

## 2021-12-17 RX ORDER — ALBUTEROL SULFATE 90 UG/1
1-2 AEROSOL, METERED RESPIRATORY (INHALATION) EVERY 4 HOURS PRN
Qty: 20.1 G | Refills: 11 | Status: SHIPPED | OUTPATIENT
Start: 2021-12-17 | End: 2022-05-06

## 2021-12-20 RX ORDER — CEPHRADINE 500 MG
10000 CAPSULE ORAL
Qty: 12 EACH | Refills: 6 | Status: SHIPPED | OUTPATIENT
Start: 2021-12-20

## 2022-01-03 DIAGNOSIS — R73.03 PREDIABETES: Primary | ICD-10-CM

## 2022-01-03 RX ORDER — METFORMIN HYDROCHLORIDE 500 MG/1
500 TABLET, EXTENDED RELEASE ORAL
Qty: 90 TABLET | Refills: 0 | Status: SHIPPED | OUTPATIENT
Start: 2022-01-03 | End: 2022-06-17 | Stop reason: SDUPTHER

## 2022-01-24 ENCOUNTER — PATIENT MESSAGE (OUTPATIENT)
Dept: FAMILY MEDICINE | Facility: CLINIC | Age: 37
End: 2022-01-24
Payer: COMMERCIAL

## 2022-02-24 ENCOUNTER — TELEPHONE (OUTPATIENT)
Dept: FAMILY MEDICINE | Facility: CLINIC | Age: 37
End: 2022-02-24
Payer: COMMERCIAL

## 2022-02-24 DIAGNOSIS — M79.672 CHRONIC FOOT PAIN, LEFT: Primary | ICD-10-CM

## 2022-02-24 DIAGNOSIS — G89.29 CHRONIC FOOT PAIN, LEFT: Primary | ICD-10-CM

## 2022-02-24 NOTE — TELEPHONE ENCOUNTER
No problem, Podiatry referral done.    We just have to have a reason or diagnosis listed on the referral as to why requested.

## 2022-02-24 NOTE — TELEPHONE ENCOUNTER
Spoke with pt otp who states her foot hurts really bad. Pt states it is her left foot, on the left side of her foot. Pt says it hurts to rest her foot on that side. Pt states it has been going on for a long time, maybe years. Pt states it has worsened lately. Pt says she believes there may be a bone in the location of the pain. Pt states pressure/touch worsens the pain. Pt has not taken pain medication because it is so consistent. Pt states she does not normally like to get checked out, but would like referral to podiatry because it has been happening for so long and is worsening. Please advise.

## 2022-02-24 NOTE — TELEPHONE ENCOUNTER
----- Message from Ammon Deluna MA sent at 2/24/2022  1:04 PM CST -----  Contact: TEA HUNTER [[id number]]    ----- Message -----  From: David Hart  Sent: 2/24/2022  11:59 AM CST  To: Trip DEJESUS Staff    Type: Patient Call Back    Who called:TEA HUNTER [[id number]]    What is the request in detail: The patient would like a referral to podiatry     Can the clinic reply by [facility]?    Would the patient rather a call back or a response via [facility]?     Best call back number: [phone] (mobile)    Additional Information:

## 2022-03-11 ENCOUNTER — PATIENT MESSAGE (OUTPATIENT)
Dept: FAMILY MEDICINE | Facility: CLINIC | Age: 37
End: 2022-03-11
Payer: COMMERCIAL

## 2022-03-16 ENCOUNTER — TELEPHONE (OUTPATIENT)
Dept: FAMILY MEDICINE | Facility: CLINIC | Age: 37
End: 2022-03-16
Payer: COMMERCIAL

## 2022-03-16 DIAGNOSIS — R73.03 PREDIABETES: Primary | ICD-10-CM

## 2022-03-16 DIAGNOSIS — E66.01 MORBID OBESITY WITH BMI OF 45.0-49.9, ADULT: ICD-10-CM

## 2022-04-01 ENCOUNTER — TELEPHONE (OUTPATIENT)
Dept: FAMILY MEDICINE | Facility: CLINIC | Age: 37
End: 2022-04-01

## 2022-04-01 NOTE — TELEPHONE ENCOUNTER
----- Message from Brooke Summers sent at 4/1/2022  9:25 AM CDT -----  Contact: self/[phone]  Type:  Same Day Appointment Request    Caller is requesting a same day appointment.  Caller declined first available appointment listed below.    Name of Caller:Ralph Hughes    When is the first available appointment?  Symptoms: hospital follow-up  Best Call Back Number:[phone]  Additional Information:

## 2022-04-01 NOTE — TELEPHONE ENCOUNTER
----- Message from Tanya Costa sent at 4/1/2022 10:13 AM CDT -----  Contact: Patient  Type:  Patient Returning Call    Who Called:Ralph Hughes  Who Left Message for Patient:Scarlet  Does the patient know what this is regarding?:appointment  Would the patient rather a call back or a response via [facility]? Call back  Best Call Back Number:[phone]  Additional Information:

## 2022-04-01 NOTE — TELEPHONE ENCOUNTER
Spoke with pt. Advised we could schedule her on 4/11 at the soonest. Pt took appt and will cancel if things change or go to Urgent Care if symptoms worsen.

## 2022-04-06 ENCOUNTER — PATIENT MESSAGE (OUTPATIENT)
Dept: FAMILY MEDICINE | Facility: CLINIC | Age: 37
End: 2022-04-06
Payer: COMMERCIAL

## 2022-04-11 DIAGNOSIS — E11.9 TYPE 2 DIABETES MELLITUS WITHOUT COMPLICATION, WITHOUT LONG-TERM CURRENT USE OF INSULIN: Primary | ICD-10-CM

## 2022-04-22 ENCOUNTER — PATIENT MESSAGE (OUTPATIENT)
Dept: FAMILY MEDICINE | Facility: CLINIC | Age: 37
End: 2022-04-22
Payer: COMMERCIAL

## 2022-05-03 DIAGNOSIS — M79.672 BILATERAL FOOT PAIN: Primary | ICD-10-CM

## 2022-05-03 DIAGNOSIS — M79.671 BILATERAL FOOT PAIN: Primary | ICD-10-CM

## 2022-05-04 ENCOUNTER — HOSPITAL ENCOUNTER (OUTPATIENT)
Dept: RADIOLOGY | Facility: HOSPITAL | Age: 37
Discharge: HOME | End: 2022-05-04
Attending: PODIATRIST
Payer: COMMERCIAL

## 2022-05-04 ENCOUNTER — OFFICE VISIT (OUTPATIENT)
Dept: PODIATRY | Facility: CLINIC | Age: 37
End: 2022-05-04
Payer: COMMERCIAL

## 2022-05-04 VITALS — BODY MASS INDEX: 49.69 KG/M2 | HEIGHT: 62 IN | WEIGHT: 270 LBS

## 2022-05-04 DIAGNOSIS — M79.671 BILATERAL FOOT PAIN: ICD-10-CM

## 2022-05-04 DIAGNOSIS — M21.42 ACQUIRED PES PLANUS OF BOTH FEET: ICD-10-CM

## 2022-05-04 DIAGNOSIS — M76.72 PERONEAL TENDONITIS, LEFT: Primary | ICD-10-CM

## 2022-05-04 DIAGNOSIS — M21.41 ACQUIRED PES PLANUS OF BOTH FEET: ICD-10-CM

## 2022-05-04 DIAGNOSIS — M79.672 BILATERAL FOOT PAIN: ICD-10-CM

## 2022-05-04 PROCEDURE — 99204 PR OFFICE/OUTPT VISIT, NEW, LEVL IV, 45-59 MIN: ICD-10-PCS | Mod: S$GLB,,, | Performed by: PODIATRIST

## 2022-05-04 PROCEDURE — 73630 X-RAY EXAM OF FOOT: CPT | Mod: 26,,, | Performed by: RADIOLOGY

## 2022-05-04 PROCEDURE — 99999 PR PBB SHADOW E&M-EST. PATIENT-LVL III: CPT | Mod: PBBFAC,,, | Performed by: PODIATRIST

## 2022-05-04 PROCEDURE — 3008F PR BODY MASS INDEX (BMI) DOCUMENTED: ICD-10-PCS | Mod: CPTII,S$GLB,, | Performed by: PODIATRIST

## 2022-05-04 PROCEDURE — 3008F BODY MASS INDEX DOCD: CPT | Mod: CPTII,S$GLB,, | Performed by: PODIATRIST

## 2022-05-04 PROCEDURE — 73630 X-RAY EXAM OF FOOT: CPT | Mod: TC,50,FY,PO

## 2022-05-04 PROCEDURE — 99213 OFFICE O/P EST LOW 20 MIN: CPT | Mod: PBBFAC,PO | Performed by: PODIATRIST

## 2022-05-04 PROCEDURE — 99204 OFFICE O/P NEW MOD 45 MIN: CPT | Mod: S$GLB,,, | Performed by: PODIATRIST

## 2022-05-04 PROCEDURE — 1159F MED LIST DOCD IN RCRD: CPT | Mod: CPTII,S$GLB,, | Performed by: PODIATRIST

## 2022-05-04 PROCEDURE — 1159F PR MEDICATION LIST DOCUMENTED IN MEDICAL RECORD: ICD-10-PCS | Mod: CPTII,S$GLB,, | Performed by: PODIATRIST

## 2022-05-04 PROCEDURE — 73630 XR FOOT COMPLETE 3 VIEW BILATERAL: ICD-10-PCS | Mod: 26,,, | Performed by: RADIOLOGY

## 2022-05-04 PROCEDURE — 99999 PR PBB SHADOW E&M-EST. PATIENT-LVL III: ICD-10-PCS | Mod: PBBFAC,,, | Performed by: PODIATRIST

## 2022-05-04 RX ORDER — DICLOFENAC SODIUM 75 MG/1
75 TABLET, DELAYED RELEASE ORAL 2 TIMES DAILY
Qty: 20 TABLET | Refills: 0 | Status: SHIPPED | OUTPATIENT
Start: 2022-05-04 | End: 2022-05-14

## 2022-05-04 NOTE — PATIENT INSTRUCTIONS
SHANE LEWIS- any model on website    [employer] TENNIS SHOES-  ANTHONY JUNIOR 5     [email or web address] purchase COPPER superfeet orthotic inserts and place in tennis shoes    PERONEAL TENDON INJURIES     Peroneal Tendon Injuries  What Are the Peroneal Tendons?    A tendon is a band of tissue   That  connects a muscle to a bone. The two peroneal tendons in the foot run [id number] behind the outer ankle bone. One peroneal tendon attaches to the outer part of the midfoot, while the other tendon runs under the foot and attaches near the inside of the arch. The main function of the peroneal tendons is to stabilize the foot and ankle and protect them from sprains.  Causes and Symptoms of Peroneal Tendon Injuries  Peroneal tendon injuries may be acute (occurring suddenly) or chronic (developing over a period of time). They most commonly occur in individuals who participate in sports that involve repetitive ankle motion. In addition, people with higher arches are at risk for developing peroneal tendon injuries. Basic types of peroneal tendon injuries are tendonitis, tears, and subluxation.  Tendonitis is an inflammation of one or both tendons. The inflammation is caused by activities involving repetitive use of the tendon, overuse of the tendon, or trauma (such as an ankle sprain). Symptoms of tendonitis include:  Pain   Swelling   Warmth to the touch  Acute tears are caused by repetitive activity or trauma. Immediate symptoms of acute tears include:  Pain   Swelling   Weakness or instability of the foot and ankle  As time goes on, these tears may lead to a change in the shape of the foot, in which the arch may become higher.  Degenerative tears (tendonosis) are usually due to overuse and occur over long periods of time - often years. In degenerative tears, the tendon is like taffy that has been overstretched until it becomes thin and eventually frays. Having high arches also puts you at risk for developing a degenerative tear. The  symptoms of degenerative tears may include:  Sporadic pain (occurring from time to time) on the outside of the ankle   Weakness or instability in the ankle   An increase in the height of the arch  Subluxation - one or both tendons have slipped out of their normal position. In some cases, subluxation is due to a condition in which a person is born with a variation in the shape of the bone or muscle. In other cases, subluxation occurs following trauma, such as an ankle sprain. Damage or injury to the tissues that stabilize the tendons (retinaculum) can lead to chronic tendon subluxation. The symptoms of subluxation may include:  A snapping feeling of the tendon around the ankle bone   Sporadic pain behind the outside ankle bone   Ankle instability or weakness  Early treatment of a subluxation is critical, since a tendon that continues to sublux (move out of position) is more likely to tear or rupture. Therefore, if you feel the characteristic snapping, see a foot and ankle surgeon immediately.  Diagnosis  Because peroneal tendon injuries are sometimes misdiagnosed and may worsen without proper treatment, prompt evaluation by a foot and ankle surgeon is advised. To diagnose a peroneal tendon injury, the surgeon will examine the foot and look for pain, instability, swelling, warmth, and weakness on the outer side of the ankle. In addition, an x-ray or other advanced imaging studies may be needed to fully evaluate the injury. The foot and ankle surgeon will also look for signs of an ankle sprain and other related injuries that sometimes accompany a peroneal tendon injury. Proper diagnosis is important because prolonged discomfort after a simple sprain may be a sign of additional problems.  Non-Surgical Treatment  Treatment depends on the type of peroneal tendon injury. Options include:  Immobilization. A cast or splint may be used to keep the foot and ankle from moving and allow the injury to heal.   Medications. Oral or  injected anti-inflammatory drugs may help relieve the pain and inflammation.   Physical therapy. Ice, heat, or ultrasound therapy may be used to reduce swelling and pain. As symptoms improve, exercises can be added to strengthen the muscles and improve range of motion and balance.   Bracing. The surgeon may provide a brace to use for a short while or during activities requiring repetitive ankle motion. Bracing may also be an option when a patient is not a candidate for surgery.  When is Surgery Needed?  In some cases, surgery may be needed to repair the tendon or tendons and perhaps the supporting structures of the foot. The foot and ankle surgeon will determine the most appropriate procedure for the patients condition and lifestyle. After surgery, physical therapy is an important part of rehabilitation.

## 2022-05-04 NOTE — PROGRESS NOTES
PODIATRIC MEDICINE AND SURGERY  5/4/2022    PCP:  Primary Doctor No    CHIEF COMPLAINT   Chief Complaint   Patient presents with    Foot Pain     C/o left foot pain, lateral aspect, rates pain 8/10, x years, non-diabetic, wears tennis and socks, last seen PCP Vinnie Dominique on 12/16/21       HPI:    Ralph Hughes is a 36 y.o. female who has a past medical history of Herpes simplex virus (HSV) infection.   Ralph presents to clinic today complaining of lateral foot pain on LEFT foot. Pt states symptoms have been present for years but is worsening. She rates pain 8/10 on pain scale. 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 Pain is triggered with increased ambulation or standing.      Patient denies other pedal complaints at this time.     PMH  Past Medical History:   Diagnosis Date    Herpes simplex virus (HSV) infection        PROBLEM LIST  Patient Active Problem List    Diagnosis Date Noted    THERON on CPAP 06/24/2021    Prediabetes 08/28/2020    Obesity 08/29/2019    HSV-2 (herpes simplex virus 2) infection 12/05/2017    Asthma in adult without complication 01/19/2017       MEDS  Current Outpatient Medications on File Prior to Visit   Medication Sig Dispense Refill    cholecalciferol, vitamin D3, (VITAMIN D3) 250 mcg (10,000 unit) Cap Take 1 capsule (10,000 Units total) by mouth 3 (three) times a week. 12 each 6    levonorgestreL (MIRENA) 20 mcg/24 hours (5 yrs) 52 mg IUD 1 each by Intrauterine route once.      metFORMIN (GLUCOPHAGE-XR) 500 MG ER 24hr tablet Take 1 tablet (500 mg total) by mouth daily with dinner or evening meal. 90 tablet 0    albuterol (VENTOLIN HFA) 90 mcg/actuation inhaler Inhale 1-2 puffs into the lungs every 4 (four) hours as needed for Wheezing or Shortness of Breath. Rescue 20.1 g 11    valACYclovir (VALTREX) 500 MG tablet Take 1 tablet (500 mg total) by mouth 2 (two) times daily. 60 tablet 2     No current facility-administered medications on file  prior to visit.       Medication List with Changes/Refills   New Medications    DICLOFENAC (VOLTAREN) 75 MG EC TABLET    Take 1 tablet (75 mg total) by mouth 2 (two) times daily. for 10 days   Current Medications    ALBUTEROL (VENTOLIN HFA) 90 MCG/ACTUATION INHALER    Inhale 1-2 puffs into the lungs every 4 (four) hours as needed for Wheezing or Shortness of Breath. Rescue    CHOLECALCIFEROL, VITAMIN D3, (VITAMIN D3) 250 MCG (10,000 UNIT) CAP    Take 1 capsule (10,000 Units total) by mouth 3 (three) times a week.    LEVONORGESTREL (MIRENA) 20 MCG/24 HOURS (5 YRS) 52 MG IUD    1 each by Intrauterine route once.    METFORMIN (GLUCOPHAGE-XR) 500 MG ER 24HR TABLET    Take 1 tablet (500 mg total) by mouth daily with dinner or evening meal.    VALACYCLOVIR (VALTREX) 500 MG TABLET    Take 1 tablet (500 mg total) by mouth 2 (two) times daily.       PSH     Past Surgical History:   Procedure Laterality Date     SECTION      x3    Ectopic Pregnacy Removal  10/13/2016    TUBAL LIGATION      bilateral salpingectomy        ALL  Review of patient's allergies indicates:  No Known Allergies    SOC     Social History     Tobacco Use    Smoking status: Never Smoker    Smokeless tobacco: Never Used   Substance Use Topics    Alcohol use: Not Currently     Comment: occasionally    Drug use: No         FAMILY HX    Family History   Problem Relation Age of Onset    Hypertension Mother     Cancer Mother         lung    Breast cancer Neg Hx     Colon cancer Neg Hx     Ovarian cancer Neg Hx     Thrombophilia Neg Hx             REVIEW OF SYSTEMS  General: This patient is well-developed, well-nourished and appears stated age, well-oriented to person, place and time, and cooperative and pleasant on today's visit   Constitutional: Negative for chills and fever.   Respiratory: Negative for shortness of breath.    Cardiovascular: Negative for chest pain, palpitations, orthopnea  Gastrointestinal: Negative for diarrhea, nausea  "and vomiting.   Musculoskeletal: Positive for above noted in HPI  Skin: Negative for skin and/or nail changes   Neurological: Negative  for tingling and sensory changes  Peripheral Vascular: no claudication or cyanosis  Psychiatric/Behavioral: Negative for altered mental status     PHYSICAL EXAM:      Vitals:    05/04/22 0854   Weight: 122.5 kg (270 lb)   Height: 5' 2" (1.575 m)   PainSc:   8         LOWER EXTREMITY PHYSICAL EXAM  VASCULAR  Dorsalis pedis and posterior tibial pulses palpable 2/4 bilaterally. Capillary refill time immediate to the toes. Feet are warm to the touch. Skin temperature warm to warm from proximally to distally There are no varicosities, telangiectasias noted to bilateral foot and ankle regions. There are no ecchymoses noted to bilateral foot and ankle regions. There is no gross lower extremity edema.    DERMATOLOGIC  Skin moist with healthy texture and turgor.There are no open ulcerations, lacerations, or fissures to bilateral foot and ankle regions. There are no signs of infection as there is no erythema, no proximal-extending lymphangiitis, no fluctuance, or crepitus noted on palpation to bilateral foot and ankle regions. There is no interdigital maceration.   There are no hyperkeratotic lesions noted to feet. Nails are well-trimmed.    NEUROLOGIC  Epicritic sensation is intact as the patient is able to sense light touch to bilateral foot and ankle regions. Achilles and patellar deep tendon reflexes intact. Babinski reflex absent    ORTHOPEDIC/BIOMECHANICAL  TTP at fifth metatarsal base, insertion point at peroneal brevis.  Muscle strength AT/EHL/EDL/PT: 5/5; Achilles/Gastroc/Soleus: 5/5; PB/PL: 5/5 Muscle tone is normal. Ankle joint ROM non painful with DF/PF, non-crepitus; STJ ROM  Inv/ev non painful, non crepitus ;       ASSESSMENT     Encounter Diagnoses   Name Primary?    Peroneal tendonitis, left     Acquired pes planus of both feet Yes         PLAN  Patient was educated about " clinical and imaging findings, and verbalizes understanding of above.     Diagnoses and all orders for this visit:  Acquired pes planus of both feet    Peroneal tendonitis, left  -     Ambulatory referral/consult to Podiatry    Other orders  -     diclofenac (VOLTAREN) 75 MG EC tablet; Take 1 tablet (75 mg total) by mouth 2 (two) times daily. for 10 days  Dispense: 20 tablet; Refill: 0      Patient was educated and counseled regarding peroneal tendinitis. At this time, the patient agreed to proceed with conservative treatment for relative support. Until symptoms improve, the patient was instructed to limit ambulation or activities on uneven surfaces that may overwork and stress the affected tendon. Patient was also given recommendations for motion controlling inserts and supportive shoe for long term maintenance. Prescription was written for voltaren for pain and inflammation to be discontinued if any stomach irritation occurs. If pain persists we may also consider MRI imaging to evaluate the quality of the tendon.        Disclaimer:  This note may have been prepared using voice recognition software, it may have not been extensively proofed, as such there could be errors within the text such as sound alike errors.         Future Appointments   Date Time Provider Department Center   5/6/2022  3:00 PM Flor Lee NP [facility] BRINDA [place]   6/8/2022  8:40 AM Bree Back DPM [facility] HARJIND[facility] [place]       Report Electronically Signed By:     Bree Back DPM   Podiatry  [facility]  5/4/2022

## 2022-05-06 ENCOUNTER — OFFICE VISIT (OUTPATIENT)
Dept: DIABETES | Facility: CLINIC | Age: 37
End: 2022-05-06
Payer: COMMERCIAL

## 2022-05-06 VITALS
DIASTOLIC BLOOD PRESSURE: 69 MMHG | BODY MASS INDEX: 50.39 KG/M2 | SYSTOLIC BLOOD PRESSURE: 136 MMHG | HEIGHT: 62 IN | HEART RATE: 100 BPM | WEIGHT: 273.81 LBS

## 2022-05-06 DIAGNOSIS — E66.01 MORBID OBESITY WITH BMI OF 50.0-59.9, ADULT: ICD-10-CM

## 2022-05-06 DIAGNOSIS — E11.9 CONTROLLED TYPE 2 DIABETES MELLITUS WITHOUT COMPLICATION, WITHOUT LONG-TERM CURRENT USE OF INSULIN: Primary | ICD-10-CM

## 2022-05-06 LAB — GLUCOSE SERPL-MCNC: 177 MG/DL (ref 70–110)

## 2022-05-06 PROCEDURE — 82962 GLUCOSE BLOOD TEST: CPT | Mod: PBBFAC,PO | Performed by: NURSE PRACTITIONER

## 2022-05-06 PROCEDURE — 99203 PR OFFICE/OUTPT VISIT, NEW, LEVL III, 30-44 MIN: ICD-10-PCS | Mod: S$GLB,,, | Performed by: NURSE PRACTITIONER

## 2022-05-06 PROCEDURE — 99999 PR PBB SHADOW E&M-EST. PATIENT-LVL IV: ICD-10-PCS | Mod: PBBFAC,,, | Performed by: NURSE PRACTITIONER

## 2022-05-06 PROCEDURE — 3078F DIAST BP <80 MM HG: CPT | Mod: CPTII,S$GLB,, | Performed by: NURSE PRACTITIONER

## 2022-05-06 PROCEDURE — 3075F PR MOST RECENT SYSTOLIC BLOOD PRESS GE 130-139MM HG: ICD-10-PCS | Mod: CPTII,S$GLB,, | Performed by: NURSE PRACTITIONER

## 2022-05-06 PROCEDURE — 3008F BODY MASS INDEX DOCD: CPT | Mod: CPTII,S$GLB,, | Performed by: NURSE PRACTITIONER

## 2022-05-06 PROCEDURE — 1160F RVW MEDS BY RX/DR IN RCRD: CPT | Mod: CPTII,S$GLB,, | Performed by: NURSE PRACTITIONER

## 2022-05-06 PROCEDURE — 99214 OFFICE O/P EST MOD 30 MIN: CPT | Mod: PBBFAC,PO | Performed by: NURSE PRACTITIONER

## 2022-05-06 PROCEDURE — 99999 PR PBB SHADOW E&M-EST. PATIENT-LVL IV: CPT | Mod: PBBFAC,,, | Performed by: NURSE PRACTITIONER

## 2022-05-06 PROCEDURE — 3078F PR MOST RECENT DIASTOLIC BLOOD PRESSURE < 80 MM HG: ICD-10-PCS | Mod: CPTII,S$GLB,, | Performed by: NURSE PRACTITIONER

## 2022-05-06 PROCEDURE — 3008F PR BODY MASS INDEX (BMI) DOCUMENTED: ICD-10-PCS | Mod: CPTII,S$GLB,, | Performed by: NURSE PRACTITIONER

## 2022-05-06 PROCEDURE — 1160F PR REVIEW ALL MEDS BY PRESCRIBER/CLIN PHARMACIST DOCUMENTED: ICD-10-PCS | Mod: CPTII,S$GLB,, | Performed by: NURSE PRACTITIONER

## 2022-05-06 PROCEDURE — 3075F SYST BP GE 130 - 139MM HG: CPT | Mod: CPTII,S$GLB,, | Performed by: NURSE PRACTITIONER

## 2022-05-06 PROCEDURE — 1159F PR MEDICATION LIST DOCUMENTED IN MEDICAL RECORD: ICD-10-PCS | Mod: CPTII,S$GLB,, | Performed by: NURSE PRACTITIONER

## 2022-05-06 PROCEDURE — 99203 OFFICE O/P NEW LOW 30 MIN: CPT | Mod: S$GLB,,, | Performed by: NURSE PRACTITIONER

## 2022-05-06 PROCEDURE — 1159F MED LIST DOCD IN RCRD: CPT | Mod: CPTII,S$GLB,, | Performed by: NURSE PRACTITIONER

## 2022-05-06 RX ORDER — SEMAGLUTIDE 1.34 MG/ML
0.5 INJECTION, SOLUTION SUBCUTANEOUS
Qty: 1 PEN | Refills: 5 | Status: SHIPPED | OUTPATIENT
Start: 2022-05-06 | End: 2022-06-17

## 2022-05-06 RX ORDER — CYCLOBENZAPRINE HCL 10 MG
TABLET ORAL
COMMUNITY
Start: 2021-11-25 | End: 2022-10-24

## 2022-05-06 RX ORDER — KETOROLAC TROMETHAMINE 10 MG/1
10 TABLET, FILM COATED ORAL
COMMUNITY
Start: 2021-11-25 | End: 2022-10-24

## 2022-05-06 RX ORDER — INSULIN PUMP SYRINGE, 3 ML
EACH MISCELLANEOUS
Qty: 1 EACH | Refills: 0 | Status: SHIPPED | OUTPATIENT
Start: 2022-05-06 | End: 2024-03-01

## 2022-05-06 RX ORDER — LANCETS
EACH MISCELLANEOUS
Qty: 50 EACH | Refills: 11 | Status: SHIPPED | OUTPATIENT
Start: 2022-05-06 | End: 2023-03-14 | Stop reason: SDUPTHER

## 2022-05-06 NOTE — PROGRESS NOTES
Subjective:         Patient ID: Ralph Hughes is a 36 y.o. female.  Patient's current PCP is [facility].     Chief Complaint: Diabetes Mellitus    HPI  Ralph Hughes is a 36 y.o. Black or  female presenting for a new consult for diabetes. Patient has been diagnosed with diabetes since 2022 .  The patient was initially diagnosed with Type 2 diabetes mellitus based on the following criteria: lab work  Received diabetes education: No-Referral placed    CURRENT DM MEDICATIONS:   Diabetes Medications             metFORMIN (GLUCOPHAGE-XR) 500 MG ER 24hr tablet Take 1 tablet (500 mg total) by mouth daily with dinner or evening meal.        Past failed treatment include: None    Blood glucose testing is not performed.   Meter: Does not have a meter  Preferred lab: [facility]    Her blood sugar in the clinic today was:   Lab Results   Component Value Date    POCGLU 177 (A) 05/06/2022     Ralph Hughes presents today to discuss DM management.   Home blood sugar records: Not checking    Presents today to establish care for diabetes management.  She has been trying to lose weight but has not been successful.  She is interested in gastric sleeve surgery.  She was unsure if her diagnosis was still pre diabetes or if she has type 2 diabetes.  She has had 2 A1cs in the diabetic range taken on different lab draws.  She is taking 1 metformin nightly and is tolerating fairly well from a GI perspective.  We discussed mechanism of action of metformin and will increase.  Will also add in Ozempic for further postprandial support as well as help with weight loss.  We reviewed the diabetic diet in detail and emphasized importance of exercise.  It would be beneficial to have her meet with Diabetes Education as well for further support.    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     Current diet:  Bfast- skipped. Or antonio good cups. Drinks water and occasional gatorade. Lunch- meat,  veggie (occasional potatoes), and a salad. Supper-More baked than fried meats. Enjoys spinach salad with shrimp.   Activity Level: Walking, has gym membership but doesn't go often    Lab Results   Component Value Date    HGBA1C 6.7 (H) 12/17/2021    HGBA1C 6.5 (H) 03/15/2021    HGBA1C 6.2 (H) 09/04/2020     Any episodes of hypoglycemia?  Feel symptomatic at times but has not checked officially.  Hypoglycemia Unawareness? no   Severe hypoglycemia requiring 3rd party no  Complications related to diabetes: none    STANDARDS OF CARE  Diabetes Management Status    Statin: Not taking  ACE/ARB: Not taking    Screening or Prevention Patient's value Goal Complete/Controlled?   HgA1C Testing and Control   Lab Results   Component Value Date    HGBA1C 6.7 (H) 12/17/2021      Annually/Less than 8% Yes   Lipid profile : 09/04/2020 Annually No   LDL control Lab Results   Component Value Date    LDLCALC 110.0 09/04/2020    Annually/Less than 100 mg/dl  No   Nephropathy screening No results found for: LABMICR  Lab Results   Component Value Date    PROTEINUA Negative 10/06/2016     No results found for: UTPCR   Annually No   Blood pressure BP Readings from Last 1 Encounters:   05/06/22 136/69    Less than 140/90 Yes   Dilated retinal exam Most Recent Eye Exam Date: Not Found Annually No   Foot exam   Most Recent Foot Exam Date: Not Found Annually No     Labs reviewed and are noted below.    Lab Results   Component Value Date    WBC 4.51 09/04/2020    HGB 11.9 (L) 09/04/2020    HCT 38.6 09/04/2020     09/04/2020    CHOL 155 09/04/2020    TRIG 45 09/04/2020    HDL 36 (L) 09/04/2020    LDLCALC 110.0 09/04/2020    ALT 12 09/04/2020    AST 12 09/04/2020     (L) 09/04/2020    K 4.1 09/04/2020     09/04/2020    ANIONGAP 6 (L) 09/04/2020    CREATININE 0.8 09/04/2020    ESTGFRAFRICA >60.0 09/04/2020    EGFRNONAA >60.0 09/04/2020    BUN 12 09/04/2020    CO2 28 09/04/2020    TSH 1.477 12/17/2021    GLU 77 09/04/2020     Lab  Results   Component Value Date    T3FREE 2.6 12/17/2021    FREET4 0.88 12/17/2021    TSH 1.477 12/17/2021    TOTALTESTOST 33 12/17/2021    LGTYGLFI42 365 12/17/2021    CALCIUM 9.0 09/04/2020     No results found for: CPEPTIDE  No results found for: GLUTAMICACID  Glucose   Date Value Ref Range Status   09/04/2020 77 70 - 110 mg/dL Final     Anion Gap   Date Value Ref Range Status   09/04/2020 6 (L) 8 - 16 mmol/L Final     eGFR if    Date Value Ref Range Status   09/04/2020 >60.0 >60 mL/min/1.73 m^2 Final     eGFR if non    Date Value Ref Range Status   09/04/2020 >60.0 >60 mL/min/1.73 m^2 Final     Comment:     Calculation used to obtain the estimated glomerular filtration  rate (eGFR) is the CKD-EPI equation.          The following portions of the patient's history were reviewed and updated as appropriate: allergies, current medications, past family history, past medical history, past social history, past surgical history and problem list.    Review of patient's allergies indicates:  No Known Allergies  Social History     Socioeconomic History    Marital status:    Occupational History     Employer: [employer]   Tobacco Use    Smoking status: Never Smoker    Smokeless tobacco: Never Used   Substance and Sexual Activity    Alcohol use: Not Currently     Comment: occasionally    Drug use: No    Sexual activity: Yes     Partners: Male     Birth control/protection: Surgical, I.U.D.     Comment: Mirena    Social History Narrative    She works at [employer] as a .     Social Determinants of Health     Financial Resource Strain: Low Risk     Difficulty of Paying Living Expenses: Not very hard   Food Insecurity: No Food Insecurity    Worried About Running Out of Food in the Last Year: Never true    Ran Out of Food in the Last Year: Never true   Transportation Needs: No Transportation Needs    Lack of Transportation (Medical): No    Lack  of Transportation (Non-Medical): No   Physical Activity: Insufficiently Active    Days of Exercise per Week: 3 days    Minutes of Exercise per Session: 30 min   Stress: Stress Concern Present    Feeling of Stress : To some extent   Social Connections: Unknown    Frequency of Communication with Friends and Family: More than three times a week    Frequency of Social Gatherings with Friends and Family: Once a week    Active Member of Clubs or Organizations: No    Attends Club or Organization Meetings: Never    Marital Status:    Housing Stability: Low Risk     Unable to Pay for Housing in the Last Year: No    Number of Places Lived in the Last Year: 1    Unstable Housing in the Last Year: No     Past Medical History:   Diagnosis Date    Herpes simplex virus (HSV) infection        REVIEW OF SYSTEMS:  Cardiovascular: History of   GI: Denies nausea,vomiting,constipation,or diarrhea.  : Denies dysuria.  SKIN: Denies rashes and lesions.  Neuro: No neuropathy.  PSYCH: No tobacco use.  ENDO: See HPI.        Objective:      Vitals:    05/06/22 1508   BP: 136/69   Pulse: 100     RESPIRATORY: No respiratory distress  NEUROLOGIC: Cranial nerves II-XII grossly intact.   PSYCHIATRIC: Alert & oriented x3. Normal mood and affect.  FOOT EXAMINATION:   Assessment:       1. Controlled type 2 diabetes mellitus without complication, without long-term current use of insulin    2. Morbid obesity with BMI of 50.0-59.9, adult        Plan:   Controlled type 2 diabetes mellitus without complication, without long-term current use of insulin  -     POCT Glucose, Hand-Held Device  -     semaglutide (OZEMPIC) 0.25 mg or 0.5 mg(2 mg/1.5 mL) pen injector; Inject 0.5 mg into the skin every 7 days.  Dispense: 1 pen; Refill: 5  -     Ambulatory referral/consult to Diabetes Education; Future; Expected date: 05/06/2022  -     blood-glucose meter kit; Use as instructed. Dispense insurance preferred meter.  Dispense: 1 each; Refill:  0  -     blood sugar diagnostic Strp; Check blood sugar daily- dispense insurance approved meter with testing supplies.  Dispense: 50 each; Refill: 11  -     lancets Misc; Check blood sugar daily-dispense insurance approved meter with testing supplies.  Dispense: 50 each; Refill: 11    -- Medications adjusted for today's visit include:    Increase Metformin to 2 tablets/day.     Start Ozempic: 0.25 mg once a week for 4 weeks, then increase to 0.5 mg weekly thereafter. Keep me updated with how you are tolerating.    -- Limit carbs to no more than 30-45 grams with each meal. Never eat carbs by themselves, always add protein. Make snacks low carb or non-carb only.    -- Start checking blood sugar 1 time daily: Fasting blood sugar, Before lunch, before supper, 2 hours after any meal, or bedtime.     -Blood sugar goals should be a fasting blood sugar between , and no blood sugars throughout the day over 180 is good, less than 160 better, less than 140 perfect.    --Carry glucose tablets/soft peppermints/regular juice or Coke product with you at all times to treat a low blood sugar episode (less than 70). If your blood sugar is between 50-70, Chew 4 tablets or drink 1/2 cup of juice or regular Coke product. If your blood sugar is below 50, double the treatment. Re-check blood sugar in 15 minutes. If still low, repeat this. Always call the clinic to give an update for any low blood sugar episodes.    --Exercise as tolerated: Goal 30 minutes/day, 5 days/week. Start slowly and increase as tolerated.    --Follow-up for your next visit in 6 weeks.    Morbid obesity with BMI of 50.0-59.9, adult  -     Cortisol, Saliva; Future; Expected date: 05/06/2022  -     Cortisol, Saliva; Future; Expected date: 05/06/2022    -Rule out hypercortisolism with LDDST.    - Follow up: 6 weeks    I spent a total of 35 minutes on the day of the visit.This includes face to face time and non-face to face time preparing to see the patient (eg,  "review of tests), documenting clinical information in the electronic record, independently interpreting results and communicating results to the patient.    Portions of this note may have been created with voice recognition software. Occasional "wrong-word" or "sound-a-like" substitutions may have occurred due to the inherent limitations of voice recognition software. Please, read the note carefully and recognize, using context, where substitutions have occurred.           JITENDRA Sullivan  [facility]    "

## 2022-05-06 NOTE — PATIENT INSTRUCTIONS
-- Medications adjusted for today's visit include:    Increase Metformin to 2 tablets/day.     Start Ozempic: 0.25 mg once a week for 4 weeks, then increase to 0.5 mg weekly thereafter. Keep me updated with how you are tolerating.    -- Limit carbs to no more than 30-45 grams with each meal. Never eat carbs by themselves, always add protein. Make snacks low carb or non-carb only.    -- Start checking blood sugar 1 time daily: Fasting blood sugar, Before lunch, before supper, 2 hours after any meal, or bedtime.     -Blood sugar goals should be a fasting blood sugar between , and no blood sugars throughout the day over 180 is good, less than 160 better, less than 140 perfect.    --Carry glucose tablets/soft peppermints/regular juice or Coke product with you at all times to treat a low blood sugar episode (less than 70). If your blood sugar is between 50-70, Chew 4 tablets or drink 1/2 cup of juice or regular Coke product. If your blood sugar is below 50, double the treatment. Re-check blood sugar in 15 minutes. If still low, repeat this. Always call the clinic to give an update for any low blood sugar episodes.    --Exercise as tolerated: Goal 30 minutes/day, 5 days/week. Start slowly and increase as tolerated.    --Follow-up for your next visit in 6 weeks.    --Please either drop off, fax, or MyChart your readings to me as needed.

## 2022-05-25 ENCOUNTER — OFFICE VISIT (OUTPATIENT)
Dept: INTERNAL MEDICINE | Facility: CLINIC | Age: 37
End: 2022-05-25
Payer: COMMERCIAL

## 2022-05-25 VITALS
SYSTOLIC BLOOD PRESSURE: 118 MMHG | OXYGEN SATURATION: 97 % | BODY MASS INDEX: 49.17 KG/M2 | TEMPERATURE: 100 F | WEIGHT: 267.19 LBS | HEIGHT: 62 IN | DIASTOLIC BLOOD PRESSURE: 66 MMHG | HEART RATE: 101 BPM

## 2022-05-25 DIAGNOSIS — J10.1 INFLUENZA A: Primary | ICD-10-CM

## 2022-05-25 DIAGNOSIS — E66.01 MORBID OBESITY WITH BMI OF 45.0-49.9, ADULT: ICD-10-CM

## 2022-05-25 DIAGNOSIS — Z20.828 EXPOSURE TO INFLUENZA: ICD-10-CM

## 2022-05-25 LAB
CTP QC/QA: YES
CTP QC/QA: YES
FLUAV AG NPH QL: POSITIVE
FLUBV AG NPH QL: NEGATIVE
SARS-COV-2 RDRP RESP QL NAA+PROBE: NEGATIVE

## 2022-05-25 PROCEDURE — 99213 OFFICE O/P EST LOW 20 MIN: CPT | Mod: S$GLB,,,

## 2022-05-25 PROCEDURE — 3074F PR MOST RECENT SYSTOLIC BLOOD PRESSURE < 130 MM HG: ICD-10-PCS | Mod: CPTII,S$GLB,,

## 2022-05-25 PROCEDURE — 87804 POCT INFLUENZA A/B: ICD-10-PCS | Mod: QW,S$GLB,,

## 2022-05-25 PROCEDURE — U0002 COVID-19 LAB TEST NON-CDC: HCPCS | Mod: QW,S$GLB,,

## 2022-05-25 PROCEDURE — 1159F PR MEDICATION LIST DOCUMENTED IN MEDICAL RECORD: ICD-10-PCS | Mod: CPTII,S$GLB,,

## 2022-05-25 PROCEDURE — 3078F DIAST BP <80 MM HG: CPT | Mod: CPTII,S$GLB,,

## 2022-05-25 PROCEDURE — 3078F PR MOST RECENT DIASTOLIC BLOOD PRESSURE < 80 MM HG: ICD-10-PCS | Mod: CPTII,S$GLB,,

## 2022-05-25 PROCEDURE — 99999 PR PBB SHADOW E&M-EST. PATIENT-LVL IV: ICD-10-PCS | Mod: PBBFAC,,,

## 2022-05-25 PROCEDURE — 1160F PR REVIEW ALL MEDS BY PRESCRIBER/CLIN PHARMACIST DOCUMENTED: ICD-10-PCS | Mod: CPTII,S$GLB,,

## 2022-05-25 PROCEDURE — 1159F MED LIST DOCD IN RCRD: CPT | Mod: CPTII,S$GLB,,

## 2022-05-25 PROCEDURE — 3008F PR BODY MASS INDEX (BMI) DOCUMENTED: ICD-10-PCS | Mod: CPTII,S$GLB,,

## 2022-05-25 PROCEDURE — 3074F SYST BP LT 130 MM HG: CPT | Mod: CPTII,S$GLB,,

## 2022-05-25 PROCEDURE — 87804 INFLUENZA ASSAY W/OPTIC: CPT | Mod: QW,S$GLB,,

## 2022-05-25 PROCEDURE — 1160F RVW MEDS BY RX/DR IN RCRD: CPT | Mod: CPTII,S$GLB,,

## 2022-05-25 PROCEDURE — 99999 PR PBB SHADOW E&M-EST. PATIENT-LVL IV: CPT | Mod: PBBFAC,,,

## 2022-05-25 PROCEDURE — U0002: ICD-10-PCS | Mod: QW,S$GLB,,

## 2022-05-25 PROCEDURE — 99213 PR OFFICE/OUTPT VISIT, EST, LEVL III, 20-29 MIN: ICD-10-PCS | Mod: S$GLB,,,

## 2022-05-25 PROCEDURE — 3008F BODY MASS INDEX DOCD: CPT | Mod: CPTII,S$GLB,,

## 2022-05-25 RX ORDER — OSELTAMIVIR PHOSPHATE 75 MG/1
75 CAPSULE ORAL 2 TIMES DAILY
Qty: 10 CAPSULE | Refills: 0 | Status: SHIPPED | OUTPATIENT
Start: 2022-05-25 | End: 2022-05-30

## 2022-05-25 NOTE — PROGRESS NOTES
Ralph Hughes  2022  [id number]    [facility] No  Patient Care Team:  [facility] No as PCP - General  Ridge Quan MD as PCP - Internal Medicine (Internal Medicine)          Visit Type:an urgent visit for a new problem    Chief Complaint:  Chief Complaint   Patient presents with    Fatigue    Generalized Body Aches    Cough    Chills    Headache       History of Present Illness:  Son was diagnosed with the flu over the weekend  She is now experiencing the same symptoms her son has  Fatigue, body aches, chills    She started feeling bad on yesterday     History:  Past Medical History:   Diagnosis Date    Herpes simplex virus (HSV) infection      Past Surgical History:   Procedure Laterality Date     SECTION      x3    Ectopic Pregnacy Removal  10/13/2016    TUBAL LIGATION      bilateral salpingectomy     Family History   Problem Relation Age of Onset    Hypertension Mother     Cancer Mother         lung    Breast cancer Neg Hx     Colon cancer Neg Hx     Ovarian cancer Neg Hx     Thrombophilia Neg Hx      Social History     Socioeconomic History    Marital status:    Occupational History     Employer: [employer]   Tobacco Use    Smoking status: Never Smoker    Smokeless tobacco: Never Used   Substance and Sexual Activity    Alcohol use: Not Currently     Comment: occasionally    Drug use: No    Sexual activity: Yes     Partners: Male     Birth control/protection: Surgical, I.U.D.     Comment: Mirena    Social History Narrative    She works at [employer] as a .     Social Determinants of Health     Financial Resource Strain: Low Risk     Difficulty of Paying Living Expenses: Not hard at all   Food Insecurity: No Food Insecurity    Worried About Running Out of Food in the Last Year: Never true    Ran Out of Food in the Last Year: Never true   Transportation Needs: No Transportation Needs    Lack of  Transportation (Medical): No    Lack of Transportation (Non-Medical): No   Physical Activity: Insufficiently Active    Days of Exercise per Week: 3 days    Minutes of Exercise per Session: 30 min   Stress: Stress Concern Present    Feeling of Stress : To some extent   Social Connections: Unknown    Frequency of Communication with Friends and Family: More than three times a week    Frequency of Social Gatherings with Friends and Family: Twice a week    Active Member of Clubs or Organizations: No    Attends Club or Organization Meetings: Never    Marital Status:    Housing Stability: High Risk    Unable to Pay for Housing in the Last Year: Yes    Number of Places Lived in the Last Year: 1    Unstable Housing in the Last Year: No     Patient Active Problem List   Diagnosis    Asthma in adult without complication    HSV-2 (herpes simplex virus 2) infection    Obesity    Prediabetes    THERON on CPAP    Controlled type 2 diabetes mellitus without complication, without long-term current use of insulin     Review of patient's allergies indicates:  No Known Allergies    The following were reviewed at this visit: active problem list, medication list, allergies, family history, social history, and health maintenance.    Medications:  Current Outpatient Medications on File Prior to Visit   Medication Sig Dispense Refill    blood sugar diagnostic Strp Check blood sugar daily- dispense insurance approved meter with testing supplies. 50 each 11    blood-glucose meter kit Use as instructed. Dispense insurance preferred meter. 1 each 0    cholecalciferol, vitamin D3, (VITAMIN D3) 250 mcg (10,000 unit) Cap Take 1 capsule (10,000 Units total) by mouth 3 (three) times a week. 12 each 6    cyclobenzaprine (FLEXERIL) 10 MG tablet cyclobenzaprine Take 1 tablet 3 times per day for 7 days 20211125 tablet 3 times per day No route recorded 7 days suspended 10 mg      ketorolac (TORADOL) 10 mg tablet Take 10 mg by  mouth.      lancets Misc Check blood sugar daily-dispense insurance approved meter with testing supplies. 50 each 11    levonorgestreL (MIRENA) 20 mcg/24 hours (5 yrs) 52 mg IUD 1 each by Intrauterine route once.      metFORMIN (GLUCOPHAGE-XR) 500 MG ER 24hr tablet Take 1 tablet (500 mg total) by mouth daily with dinner or evening meal. 90 tablet 0    semaglutide (OZEMPIC) 0.25 mg or 0.5 mg(2 mg/1.5 mL) pen injector Inject 0.5 mg into the skin every 7 days. 1 pen 5    valACYclovir (VALTREX) 500 MG tablet Take 1 tablet (500 mg total) by mouth 2 (two) times daily. 60 tablet 2     No current facility-administered medications on file prior to visit.       Medications have been reviewed and reconciled with patient at this visit.  Barriers to medications reviewed with patient.    Adverse reactions to current medications reviewed with patient..    Over the counter medications reviewed and reconciled with patient.    Exam:  Wt Readings from Last 3 Encounters:   05/25/22 121.2 kg (267 lb 3.2 oz)   05/06/22 124.2 kg (273 lb 13 oz)   05/04/22 122.5 kg (270 lb)     Temp Readings from Last 3 Encounters:   05/25/22 99.8 °F (37.7 °C) (Temporal)   08/28/20 98.6 °F (37 °C) (Oral)   08/14/20 97.7 °F (36.5 °C)     BP Readings from Last 3 Encounters:   05/25/22 118/66   05/06/22 136/69   12/17/21 122/62     Pulse Readings from Last 3 Encounters:   05/25/22 101   05/06/22 100   12/17/21 92     Body mass index is 48.87 kg/m².      Review of Systems   Constitutional: Positive for chills and malaise/fatigue. Negative for fever.   HENT: Positive for congestion and sinus pain. Negative for sore throat.    Eyes: Negative for blurred vision and double vision.   Respiratory: Positive for cough. Negative for sputum production, shortness of breath and wheezing.    Cardiovascular: Negative for chest pain, palpitations and leg swelling.   Gastrointestinal: Negative for abdominal pain, constipation, diarrhea, heartburn, nausea and vomiting.    Genitourinary: Negative.    Musculoskeletal: Negative.    Skin: Negative.  Negative for rash.   Neurological: Negative.    Endo/Heme/Allergies: Negative.  Negative for polydipsia. Does not bruise/bleed easily.   Psychiatric/Behavioral: Negative for depression and substance abuse.     Physical Exam  Vitals and nursing note reviewed.   Constitutional:       General: She is not in acute distress.     Appearance: She is well-developed. She is obese. She is not diaphoretic.   HENT:      Head: Normocephalic and atraumatic.      Right Ear: External ear normal.      Left Ear: External ear normal.      Nose: Congestion and rhinorrhea present.   Eyes:      General:         Right eye: No discharge.         Left eye: No discharge.      Conjunctiva/sclera: Conjunctivae normal.      Pupils: Pupils are equal, round, and reactive to light.   Neck:      Thyroid: No thyromegaly.   Cardiovascular:      Rate and Rhythm: Normal rate and regular rhythm.      Pulses: Normal pulses.      Heart sounds: Normal heart sounds. No murmur heard.  Pulmonary:      Effort: Pulmonary effort is normal. No respiratory distress.      Breath sounds: Normal breath sounds. No wheezing.   Abdominal:      General: Bowel sounds are normal.   Musculoskeletal:      Cervical back: Normal range of motion and neck supple.   Lymphadenopathy:      Cervical: No cervical adenopathy.   Neurological:      Mental Status: She is alert and oriented to person, place, and time.      Cranial Nerves: No cranial nerve deficit.   Psychiatric:         Behavior: Behavior normal.         Thought Content: Thought content normal.         Judgment: Judgment normal.         Laboratory Reviewed ({No)  Lab Results   Component Value Date    WBC 4.51 09/04/2020    HGB 11.9 (L) 09/04/2020    HCT 38.6 09/04/2020     09/04/2020    CHOL 155 09/04/2020    TRIG 45 09/04/2020    HDL 36 (L) 09/04/2020    ALT 12 09/04/2020    AST 12 09/04/2020     (L) 09/04/2020    K 4.1 09/04/2020      09/04/2020    CREATININE 0.8 09/04/2020    BUN 12 09/04/2020    CO2 28 09/04/2020    TSH 1.477 12/17/2021    HGBA1C 6.7 (H) 12/17/2021     Ralph was seen today for fatigue, generalized body aches, cough, chills and headache.    Diagnoses and all orders for this visit:    Influenza A  -     POCT INFLUENZA A/B  -     POCT COVID-19 Rapid Screening  -     oseltamivir (TAMIFLU) 75 MG capsule; Take 1 capsule (75 mg total) by mouth 2 (two) times daily. for 5 days    Exposure to influenza  -     POCT INFLUENZA A/B  -     POCT COVID-19 Rapid Screening  -     oseltamivir (TAMIFLU) 75 MG capsule; Take 1 capsule (75 mg total) by mouth 2 (two) times daily. for 5 days    Morbid obesity with BMI of 45.0-49.9, adult    Work excuse given during the appointment  Discussed taking OTC meds to help with symptoms     Care Plan/Goals: Reviewed    Goals    None         Follow up: No follow-ups on file.    After visit summary was printed and given to patient upon discharge today.  Patient goals and care plan are included in After Visit Summary.

## 2022-05-25 NOTE — LETTER
May 25, 2022      [facility]  [id number] [place] [id number]  Phone: [phone]  Fax: [phone]       Patient: Ralph Hughes   YOB: 1985  Date of Visit: 05/25/2022    To Whom It May Concern:    Parish Hughes  was at [facility] on 05/25/2022. She may return to work/school on 5/30/2022 with no restrictions. If you have any questions or concerns, or if I can be of further assistance, please do not hesitate to contact me.    Sincerely,        Essence Rangel, NP

## 2022-05-27 ENCOUNTER — PATIENT MESSAGE (OUTPATIENT)
Dept: DIABETES | Facility: CLINIC | Age: 37
End: 2022-05-27
Payer: COMMERCIAL

## 2022-06-14 ENCOUNTER — CLINICAL SUPPORT (OUTPATIENT)
Dept: DIABETES | Facility: CLINIC | Age: 37
End: 2022-06-14
Payer: COMMERCIAL

## 2022-06-14 VITALS — BODY MASS INDEX: 49.41 KG/M2 | HEIGHT: 62 IN | WEIGHT: 268.5 LBS

## 2022-06-14 DIAGNOSIS — E11.9 CONTROLLED TYPE 2 DIABETES MELLITUS WITHOUT COMPLICATION, WITHOUT LONG-TERM CURRENT USE OF INSULIN: Primary | ICD-10-CM

## 2022-06-14 PROCEDURE — G0108 DIAB MANAGE TRN  PER INDIV: HCPCS | Mod: S$GLB,,, | Performed by: DIETITIAN, REGISTERED

## 2022-06-14 PROCEDURE — 99999 PR PBB SHADOW E&M-EST. PATIENT-LVL III: CPT | Mod: PBBFAC,,, | Performed by: DIETITIAN, REGISTERED

## 2022-06-14 PROCEDURE — 99999 PR PBB SHADOW E&M-EST. PATIENT-LVL III: ICD-10-PCS | Mod: PBBFAC,,, | Performed by: DIETITIAN, REGISTERED

## 2022-06-14 PROCEDURE — G0108 PR DIAB MANAGE TRN  PER INDIV: ICD-10-PCS | Mod: S$GLB,,, | Performed by: DIETITIAN, REGISTERED

## 2022-06-14 NOTE — PROGRESS NOTES
"Diabetes Care Specialist Progress Note  Author: Julio Cooper RD  Date: 6/14/2022    Program Intake  Reason for Diabetes Program Visit:: Initial Diabetes Assessment  Current diabetes risk level:: low  In the last 12 months, have you:: none  Permission to speak with others about care:: no    Lab Results   Component Value Date    HGBA1C 6.7 (H) 12/17/2021     CURRENT DM MEDICATIONS:    Metformin ER, 1000 mg daily   Ozempic, 0.5 mg weekly     Clinical    Weight: 121.8 kg (268 lb 8.3 oz)   Height: 5' 2" (157.5 cm)   Body mass index is 49.11 kg/m².    Patient Health Rating  Compared to other people your age, how would you rate your health?: Fair    Problem Review  Reviewed Problem List with Patient: yes  Active comorbidities affecting diabetes self-care.: yes  Reviewed health maintenance: yes    Clinical Assessment  Current Diabetes Treatment: Oral Medication, Injectable  Have you ever experienced hypoglycemia (low blood sugar)?: no  Have you ever experienced hyperglycemia (high blood sugar)?: no    Medication Information  How do you obtain your medications?: Patient drives  How many days a week do you miss your medications?: 3 or more (doesn't always take the full dose of metformin)  Do you sometimes have difficulty refilling your medications?: No  Medication adherence impacting ability to self-manage diabetes?: No    Labs  Do you have regular lab work to monitor your medications?: Yes  Type of Regular Lab Work: A1c  Where do you get your labs drawn?: [facility]  Lab Compliance Barriers: No    Nutritional Status  Diet: Regular  Meal Plan 24 Hour Recall: Breakfast, Lunch, Dinner, Snack  Meal Plan 24 Hour Recall - Breakfast: none - usually skips  Meal Plan 24 Hour Recall - Lunch: baked pork chop, rice and gravy with bell peppers, apple; bottle of water  Meal Plan 24 Hour Recall - Dinner: greens, cornbread; water  Meal Plan 24 Hour Recall - Snack: none  Change in appetite?: Yes (decreased with Ozempic)  Dentation:: " Intact  Recent Changes in Weight: Weight Loss  Was weight loss intentional or unintentional?: Intentional  Current nutritional status an area of need that is impacting patient's ability to self-manage diabetes?: No    Additional Social History    Support  Does anyone support you with your diabetes care?: yes  Who supports you?: self  Who takes you to your medical appointments?: self  Does the current support meet the patient's needs?: Yes  Is Support an area impacting ability to self-manage diabetes?: No    Access to Mass Media & Technology  Does the patient have access to any of the following devices or technologies?: Smart phone  Media or technology needs impacting ability to self-manage diabetes?: No    Cognitive/Behavioral Health  Alert and Oriented: Yes  Difficulty Thinking: No  Requires Prompting: No  Requires assistance for routine expression?: No  Cognitive or behavioral barriers impacting ability to self-manage diabetes?: No    Culture/Buddhism  Culture or Zoroastrian beliefs that may impact ability to access healthcare: No    Communication  Language preference: English  Hearing Problems: No  Vision Problems: Yes  Vision problem type:: Decreased Vision  Vision Assistance: Glasses  Communication needs impacting ability to self-manage diabetes?: No    Health Literacy  Preferred Learning Method: Face to Face, Reading Materials  How often do you need to have someone help you read instructions, pamphlets, or written material from your doctor or pharmacy?: Never  Health literacy needs impacting ability to self-manage diabetes?: No      Diabetes Self-Management Skills Assessment    Diabetes Disease Process/Treatment Options  Patient/caregiver able to state what happens when someone has diabetes.: yes  Patient/caregiver knows what type of diabetes they have.: no  Patient/caregiver able to identify at least three signs and symptoms of diabetes.: no  Patient able to identify at least three risk factors for diabetes.:  no  Diabetes Disease Process/Treatment Options: Skills Assessment Completed: Yes  Assessment indicates:: Knowledge deficit  Area of need?: Yes    Nutrition/Healthy Eating  Challenges to healthy eating:: snacking between meals and at night  Method of carbohydrate measurement:: eyeballing/guessing  Patient can identify foods that impact blood sugar.: yes  Patient-identified foods:: sweets, starches (bread, pasta, rice, cereal), soda  Nutrition/Healthy Eating Skills Assessment Completed:: Yes  Assessment indicates:: Knowledge deficit, Instruction Needed  Area of need?: Yes    Physical Activity/Exercise  Patient's daily activity level:: constantly moving  Patient formally exercises outside of work.: yes  Exercise Type: walking (walking during her work break)  Intensity: Low  Frequency: twice a week  Duration: 30 min  Patient can identify forms of physical activity.: yes  Stated forms of physical activity:: moving to burn calories  Patient can identify reasons why exercise/physical activity is important in diabetes management.: no  Physical Activity/Exercise Skills Assessment Completed: : Yes  Assessment indicates:: Knowledge deficit  Area of need?: Yes    Medications  Patient is able to describe current diabetes management routine.: yes  Diabetes management routine:: oral medications, injectable medications  Patient is able to identify current diabetes medications, dosages, and appropriate timing of medications.: yes  Patient understands the purpose of the medications taken for diabetes.: no  Patient reports problems or concerns with current medication regimen.: no  Medication Skills Assessment Completed:: Yes  Assessment indicates:: Knowledge deficit  Area of need?: Yes    Home Blood Glucose Monitoring  Patient states that blood sugar is checked at home daily.: no  Reasons for not monitoring:: other (see comments) (only checking when she feels bad and BG is usually less than 100)  Home Blood Glucose Monitoring Skills  Assessment Completed: : Yes  Assessment indicates:: Knowledge deficit, Instruction Needed  Area of need?: Yes    Acute Complications  Patient is able to identify types of acute complications: No  Acute Complications Skills Assessment Completed: : Yes  Assessment indicates:: Knowledge deficit, Instruction Needed  Area of need?: Yes    Chronic Complications  Patient can identify major chronic complications of diabetes.: yes  Stated chronic complications:: heart disease/heart attack, kidney disease, stroke  Patient can identify ways to prevent or delay diabetes complications.: yes  Stated ways to prevent complications:: controlling blood sugar  Patient is aware that having diabetes increases risk of heart disease?: Yes  Patient is aware that heart disease is the leading cause of death and disability in people with diabetes?: No  Patient is taking statin?: No  Chronic Complications Skills Assessment Completed: : Yes  Assessment indicates:: Knowledge deficit  Area of need?: Yes    Psychosocial/Coping  Psychosocial/Coping Skills Assessment Completed: : No  Deffered due to:: Time  Area of need?:  (deferred)    Assessment Summary and Plan    Based on today's diabetes care assessment, the following areas of need were identified:      Social 6/14/2022   Support No   Access to Mass Media/Tech No   Cognitive/Behavioral Health No   Culture/Hinduism No   Communication No   Health Literacy No        Clinical 6/14/2022   Medication Adherence No   Lab Compliance No   Nutritional Status No        Diabetes Self-Management Skills 6/14/2022   Diabetes Disease Process/Treatment Options Yes - Discussed pathology of Type 2 diabetes, risk factors and treatment options.      Nutrition/Healthy Eating Yes - care plan added     Physical Activity/Exercise Yes - Discussed physical activity as it relates to insulin resistance and weight loss.      Medication Yes - Discussed MOA, onset, side effects, dosage of meds.     Home Blood Glucose  Monitoring Yes - care plan added      Acute Complications Yes - Reviewed blood glucose goals, prevention, detection, signs and symptoms, and treatment of hypoglycemia and hyperglycemia, and when to contact the clinic.     Chronic Complications Yes - Discussed importance of A1c less than 7 to reduce risk of micro and macro complications, controlled Blood Pressure and Cholesterol Lab Values for prevention heart disease, heart attack, stroke.  Health maintenance reviewed     Psychosocial/Coping Deferred           Today's interventions were provided through individual discussion, instruction, and written materials were provided.      Patient verbalized understanding of instruction and written materials.  Pt was able to return back demonstration of instructions today. Patient understood key points, needs reinforcement and further instruction.     Diabetes Self-Management Care Plan:    Today's Diabetes Self-Management Care Plan was developed with Ralph's input. Ralph has agreed to work toward the following goal(s) to improve his/her overall diabetes control.      Care Plan: Diabetes Management   Updates made since 5/15/2022 12:00 AM      Problem: Healthy Eating       Goal: Eat 3 meals daily. Goal of 30-45g/2-3 servings of Carbohydrate per meal.    Start Date: 6/14/2022   Expected End Date: 12/14/2022   Priority: High   Barriers: No Barriers Identified   Note:    Pt has already decreased intake of sugary beverages, but has juice at times.   She will replace juice with either water or other SF beverages.      Task: Reviewed the sources and role of Carbohydrate, Protein, and Fat and how each nutrient impacts blood sugar. Completed 6/14/2022      Task: Provided visual examples using dry measuring cups, food models, and other familiar objects such as computer mouse, deck or cards, tennis ball etc. to help with visualization of portions. Completed 6/14/2022      Task: Explained how to count carbohydrates using the food  label and the use of dry measuring cups for accurate carb counting. Completed 6/14/2022      Task: Recommended replacing beverages containing high sugar content with noncaloric/sugar free options and/or water. Completed 6/14/2022      Task: Review the importance of balancing carbohydrates with each meal using portion control techniques to count servings of carbohydrate and label reading to identify serving size and amount of total carbs per serving. Completed 6/14/2022      Problem: Blood Glucose Self-Monitoring       Goal: Patient agrees to check and record fasting blood sugars daily    Start Date: 6/14/2022   Expected End Date: 12/14/2022   Priority: Medium   Barriers: No Barriers Identified      Task: Reviewed the importance of self-monitoring blood glucose and keeping logs. Completed 6/14/2022      Task: Provided patient with blood glucose logs, reviewed appropriate timing and frequency to SMBG, education on parameters on when to notify provider and advised patient to bring logs to all appts with PCP/Endocrinologist/Diabetes Care Specialist. Completed 6/14/2022          Follow Up Plan     Follow up in about 3 months (around 9/14/2022).   Will evaluate goals/review BG logs  Pt will maintain f/u with Flor Lee.     Today's care plan and follow up schedule was discussed with patient.  Ralph verbalized understanding of the care plan, goals, and agrees to follow up plan.        The patient was encouraged to communicate with his/her health care provider/physician and care team regarding his/her condition(s) and treatment.  I provided the patient with my contact information today and encouraged to contact me via phone or [facility] Patient Portal as needed.     Length of Visit   Total Time: 60 Minutes

## 2022-06-14 NOTE — LETTER
June 14, 2022        Flor Lee NP  [address]  [place] [address]             [facility]  [address] [facility] [address]  Phone: [phone]  Fax: [phone]   Patient: Ralph Hughes   MR Number: [id number]   YOB: 1985   Date of Visit: 6/14/2022       Dear Dr. Lee:    Thank you for referring Ralph Hughes to me for evaluation. Below are the relevant portions of my assessment and plan of care.    If you have questions, please do not hesitate to call me. I look forward to following Ralph along with you.    Sincerely,      Julio Cooper RD           CC  No Recipients

## 2022-06-17 ENCOUNTER — OFFICE VISIT (OUTPATIENT)
Dept: DIABETES | Facility: CLINIC | Age: 37
End: 2022-06-17
Payer: COMMERCIAL

## 2022-06-17 VITALS
DIASTOLIC BLOOD PRESSURE: 90 MMHG | WEIGHT: 270.94 LBS | BODY MASS INDEX: 49.56 KG/M2 | SYSTOLIC BLOOD PRESSURE: 140 MMHG

## 2022-06-17 DIAGNOSIS — E11.9 CONTROLLED TYPE 2 DIABETES MELLITUS WITHOUT COMPLICATION, WITHOUT LONG-TERM CURRENT USE OF INSULIN: Primary | ICD-10-CM

## 2022-06-17 DIAGNOSIS — E66.01 MORBID OBESITY WITH BMI OF 50.0-59.9, ADULT: ICD-10-CM

## 2022-06-17 DIAGNOSIS — E55.9 VITAMIN D DEFICIENCY: ICD-10-CM

## 2022-06-17 DIAGNOSIS — G47.30 SLEEP APNEA, UNSPECIFIED TYPE: ICD-10-CM

## 2022-06-17 LAB — GLUCOSE SERPL-MCNC: 171 MG/DL (ref 70–110)

## 2022-06-17 PROCEDURE — 3077F PR MOST RECENT SYSTOLIC BLOOD PRESSURE >= 140 MM HG: ICD-10-PCS | Mod: CPTII,S$GLB,, | Performed by: NURSE PRACTITIONER

## 2022-06-17 PROCEDURE — 99214 OFFICE O/P EST MOD 30 MIN: CPT | Mod: S$GLB,,, | Performed by: NURSE PRACTITIONER

## 2022-06-17 PROCEDURE — 99214 PR OFFICE/OUTPT VISIT, EST, LEVL IV, 30-39 MIN: ICD-10-PCS | Mod: S$GLB,,, | Performed by: NURSE PRACTITIONER

## 2022-06-17 PROCEDURE — 99999 PR PBB SHADOW E&M-EST. PATIENT-LVL III: CPT | Mod: PBBFAC,,, | Performed by: NURSE PRACTITIONER

## 2022-06-17 PROCEDURE — 3008F PR BODY MASS INDEX (BMI) DOCUMENTED: ICD-10-PCS | Mod: CPTII,S$GLB,, | Performed by: NURSE PRACTITIONER

## 2022-06-17 PROCEDURE — 82962 POCT GLUCOSE, HAND-HELD DEVICE: ICD-10-PCS | Mod: S$GLB,,, | Performed by: NURSE PRACTITIONER

## 2022-06-17 PROCEDURE — 82962 GLUCOSE BLOOD TEST: CPT | Mod: S$GLB,,, | Performed by: NURSE PRACTITIONER

## 2022-06-17 PROCEDURE — 99999 PR PBB SHADOW E&M-EST. PATIENT-LVL III: ICD-10-PCS | Mod: PBBFAC,,, | Performed by: NURSE PRACTITIONER

## 2022-06-17 PROCEDURE — 1159F MED LIST DOCD IN RCRD: CPT | Mod: CPTII,S$GLB,, | Performed by: NURSE PRACTITIONER

## 2022-06-17 PROCEDURE — 1160F PR REVIEW ALL MEDS BY PRESCRIBER/CLIN PHARMACIST DOCUMENTED: ICD-10-PCS | Mod: CPTII,S$GLB,, | Performed by: NURSE PRACTITIONER

## 2022-06-17 PROCEDURE — 3080F DIAST BP >= 90 MM HG: CPT | Mod: CPTII,S$GLB,, | Performed by: NURSE PRACTITIONER

## 2022-06-17 PROCEDURE — 1159F PR MEDICATION LIST DOCUMENTED IN MEDICAL RECORD: ICD-10-PCS | Mod: CPTII,S$GLB,, | Performed by: NURSE PRACTITIONER

## 2022-06-17 PROCEDURE — 3077F SYST BP >= 140 MM HG: CPT | Mod: CPTII,S$GLB,, | Performed by: NURSE PRACTITIONER

## 2022-06-17 PROCEDURE — 1160F RVW MEDS BY RX/DR IN RCRD: CPT | Mod: CPTII,S$GLB,, | Performed by: NURSE PRACTITIONER

## 2022-06-17 PROCEDURE — 3080F PR MOST RECENT DIASTOLIC BLOOD PRESSURE >= 90 MM HG: ICD-10-PCS | Mod: CPTII,S$GLB,, | Performed by: NURSE PRACTITIONER

## 2022-06-17 PROCEDURE — 3008F BODY MASS INDEX DOCD: CPT | Mod: CPTII,S$GLB,, | Performed by: NURSE PRACTITIONER

## 2022-06-17 RX ORDER — SEMAGLUTIDE 1.34 MG/ML
1 INJECTION, SOLUTION SUBCUTANEOUS
Qty: 1 PEN | Refills: 5 | Status: SHIPPED | OUTPATIENT
Start: 2022-06-17 | End: 2022-11-28 | Stop reason: RX

## 2022-06-17 RX ORDER — METFORMIN HYDROCHLORIDE 500 MG/1
1000 TABLET, EXTENDED RELEASE ORAL
Qty: 180 TABLET | Refills: 3 | Status: SHIPPED | OUTPATIENT
Start: 2022-06-17 | End: 2023-10-10

## 2022-06-17 NOTE — PATIENT INSTRUCTIONS
Someone will be calling you to schedule your eye exam.    Complete fasting labs as soon as able - ask for the salivary cortisol test at the lab - if [place] lab does not have it, check with the [place].    -- Medications adjusted for today's visit include:    Continue Metformin to 2 tablets/day.     Finish Ozempic 0.5 mg weekly then increase to 1 mg weekly. Let me know if you are not tolerating.    -- Limit carbs to no more than 30-45 grams with each meal. Never eat carbs by themselves, always add protein. Make snacks low carb or non-carb only.    -- Start checking blood sugar 1 time daily: Fasting blood sugar, Before lunch, before supper, 2 hours after any meal, or bedtime.     -Blood sugar goals should be a fasting blood sugar between , and no blood sugars throughout the day over 180 is good, less than 160 better, less than 140 perfect.    --Carry glucose tablets/soft peppermints/regular juice or Coke product with you at all times to treat a low blood sugar episode (less than 70). If your blood sugar is between 50-70, Chew 4 tablets or drink 1/2 cup of juice or regular Coke product. If your blood sugar is below 50, double the treatment. Re-check blood sugar in 15 minutes. If still low, repeat this. Always call the clinic to give an update for any low blood sugar episodes.    --Exercise as tolerated: Goal 30 minutes/day, 5 days/week. Start slowly and increase as tolerated.    --Follow-up for your next visit in 3 months.     --Please either drop off, fax, or MyChart your readings to me as needed.

## 2022-06-20 ENCOUNTER — LAB VISIT (OUTPATIENT)
Dept: LAB | Facility: HOSPITAL | Age: 37
End: 2022-06-20
Payer: COMMERCIAL

## 2022-06-20 ENCOUNTER — LAB VISIT (OUTPATIENT)
Dept: LAB | Facility: HOSPITAL | Age: 37
End: 2022-06-20
Attending: NURSE PRACTITIONER
Payer: COMMERCIAL

## 2022-06-20 DIAGNOSIS — G47.30 SLEEP APNEA, UNSPECIFIED TYPE: ICD-10-CM

## 2022-06-20 DIAGNOSIS — E55.9 VITAMIN D DEFICIENCY: ICD-10-CM

## 2022-06-20 DIAGNOSIS — E11.9 CONTROLLED TYPE 2 DIABETES MELLITUS WITHOUT COMPLICATION, WITHOUT LONG-TERM CURRENT USE OF INSULIN: ICD-10-CM

## 2022-06-20 LAB
25(OH)D3+25(OH)D2 SERPL-MCNC: 49 NG/ML (ref 30–96)
ALBUMIN SERPL BCP-MCNC: 3.6 G/DL (ref 3.5–5.2)
ALBUMIN/CREAT UR: 17.6 UG/MG (ref 0–30)
ALP SERPL-CCNC: 74 U/L (ref 55–135)
ALT SERPL W/O P-5'-P-CCNC: 22 U/L (ref 10–44)
ANION GAP SERPL CALC-SCNC: 7 MMOL/L (ref 8–16)
AST SERPL-CCNC: 14 U/L (ref 10–40)
BASOPHILS # BLD AUTO: 0.01 K/UL (ref 0–0.2)
BASOPHILS NFR BLD: 0.2 % (ref 0–1.9)
BILIRUB SERPL-MCNC: 0.4 MG/DL (ref 0.1–1)
BUN SERPL-MCNC: 10 MG/DL (ref 6–20)
CALCIUM SERPL-MCNC: 9.1 MG/DL (ref 8.7–10.5)
CHLORIDE SERPL-SCNC: 103 MMOL/L (ref 95–110)
CHOLEST SERPL-MCNC: 154 MG/DL (ref 120–199)
CHOLEST/HDLC SERPL: 4.8 {RATIO} (ref 2–5)
CO2 SERPL-SCNC: 29 MMOL/L (ref 23–29)
CREAT SERPL-MCNC: 0.8 MG/DL (ref 0.5–1.4)
CREAT UR-MCNC: 221 MG/DL (ref 15–325)
DIFFERENTIAL METHOD: ABNORMAL
EOSINOPHIL # BLD AUTO: 0.1 K/UL (ref 0–0.5)
EOSINOPHIL NFR BLD: 1.6 % (ref 0–8)
ERYTHROCYTE [DISTWIDTH] IN BLOOD BY AUTOMATED COUNT: 13.9 % (ref 11.5–14.5)
EST. GFR  (AFRICAN AMERICAN): >60 ML/MIN/1.73 M^2
EST. GFR  (NON AFRICAN AMERICAN): >60 ML/MIN/1.73 M^2
ESTIMATED AVG GLUCOSE: 146 MG/DL (ref 68–131)
GLUCOSE SERPL-MCNC: 106 MG/DL (ref 70–110)
HBA1C MFR BLD: 6.7 % (ref 4–5.6)
HCT VFR BLD AUTO: 38.5 % (ref 37–48.5)
HDLC SERPL-MCNC: 32 MG/DL (ref 40–75)
HDLC SERPL: 20.8 % (ref 20–50)
HGB BLD-MCNC: 12.1 G/DL (ref 12–16)
IMM GRANULOCYTES # BLD AUTO: 0.02 K/UL (ref 0–0.04)
IMM GRANULOCYTES NFR BLD AUTO: 0.5 % (ref 0–0.5)
LDLC SERPL CALC-MCNC: 110 MG/DL (ref 63–159)
LYMPHOCYTES # BLD AUTO: 1.6 K/UL (ref 1–4.8)
LYMPHOCYTES NFR BLD: 35.5 % (ref 18–48)
MCH RBC QN AUTO: 28.7 PG (ref 27–31)
MCHC RBC AUTO-ENTMCNC: 31.4 G/DL (ref 32–36)
MCV RBC AUTO: 91 FL (ref 82–98)
MICROALBUMIN UR DL<=1MG/L-MCNC: 39 UG/ML
MONOCYTES # BLD AUTO: 0.5 K/UL (ref 0.3–1)
MONOCYTES NFR BLD: 12 % (ref 4–15)
NEUTROPHILS # BLD AUTO: 2.2 K/UL (ref 1.8–7.7)
NEUTROPHILS NFR BLD: 50.2 % (ref 38–73)
NONHDLC SERPL-MCNC: 122 MG/DL
NRBC BLD-RTO: 0 /100 WBC
PLATELET # BLD AUTO: 191 K/UL (ref 150–450)
PMV BLD AUTO: 13.2 FL (ref 9.2–12.9)
POTASSIUM SERPL-SCNC: 4.5 MMOL/L (ref 3.5–5.1)
PROT SERPL-MCNC: 7 G/DL (ref 6–8.4)
RBC # BLD AUTO: 4.22 M/UL (ref 4–5.4)
SODIUM SERPL-SCNC: 139 MMOL/L (ref 136–145)
TRIGL SERPL-MCNC: 60 MG/DL (ref 30–150)
WBC # BLD AUTO: 4.4 K/UL (ref 3.9–12.7)

## 2022-06-20 PROCEDURE — 85025 COMPLETE CBC W/AUTO DIFF WBC: CPT | Performed by: NURSE PRACTITIONER

## 2022-06-20 PROCEDURE — 80053 COMPREHEN METABOLIC PANEL: CPT | Performed by: NURSE PRACTITIONER

## 2022-06-20 PROCEDURE — 36415 COLL VENOUS BLD VENIPUNCTURE: CPT | Performed by: NURSE PRACTITIONER

## 2022-06-20 PROCEDURE — 83036 HEMOGLOBIN GLYCOSYLATED A1C: CPT | Performed by: NURSE PRACTITIONER

## 2022-06-20 PROCEDURE — 82043 UR ALBUMIN QUANTITATIVE: CPT | Performed by: NURSE PRACTITIONER

## 2022-06-20 PROCEDURE — 82306 VITAMIN D 25 HYDROXY: CPT | Performed by: NURSE PRACTITIONER

## 2022-06-20 PROCEDURE — 82570 ASSAY OF URINE CREATININE: CPT | Performed by: NURSE PRACTITIONER

## 2022-06-20 PROCEDURE — 80061 LIPID PANEL: CPT | Performed by: NURSE PRACTITIONER

## 2022-06-21 ENCOUNTER — PATIENT MESSAGE (OUTPATIENT)
Dept: DIABETES | Facility: CLINIC | Age: 37
End: 2022-06-21
Payer: COMMERCIAL

## 2022-06-21 ENCOUNTER — TELEPHONE (OUTPATIENT)
Dept: DIABETES | Facility: CLINIC | Age: 37
End: 2022-06-21
Payer: COMMERCIAL

## 2022-06-21 ENCOUNTER — LAB VISIT (OUTPATIENT)
Dept: LAB | Facility: HOSPITAL | Age: 37
End: 2022-06-21
Payer: COMMERCIAL

## 2022-06-21 DIAGNOSIS — E66.01 MORBID OBESITY WITH BMI OF 50.0-59.9, ADULT: Primary | ICD-10-CM

## 2022-06-21 DIAGNOSIS — E11.9 CONTROLLED TYPE 2 DIABETES MELLITUS WITHOUT COMPLICATION, WITHOUT LONG-TERM CURRENT USE OF INSULIN: ICD-10-CM

## 2022-06-21 DIAGNOSIS — E66.01 MORBID OBESITY WITH BMI OF 50.0-59.9, ADULT: ICD-10-CM

## 2022-06-21 PROCEDURE — 82533 TOTAL CORTISOL: CPT | Performed by: NURSE PRACTITIONER

## 2022-06-21 NOTE — PROGRESS NOTES
No protein noted in the urine. A1c 6.7%. No anemia. Vitamin D at goal. Cholseterol looks good overall. Normal kidney and liver function.

## 2022-06-21 NOTE — TELEPHONE ENCOUNTER
----- Message from Flor Lee NP sent at 6/21/2022  8:15 AM CDT -----  No protein noted in the urine. A1c 6.7%. No anemia. Vitamin D at goal. Cholseterol looks good overall. Normal kidney and liver function.

## 2022-06-24 ENCOUNTER — OFFICE VISIT (OUTPATIENT)
Dept: OPHTHALMOLOGY | Facility: CLINIC | Age: 37
End: 2022-06-24
Payer: COMMERCIAL

## 2022-06-24 DIAGNOSIS — H52.13 MYOPIA OF BOTH EYES: ICD-10-CM

## 2022-06-24 DIAGNOSIS — E11.9 DIABETES MELLITUS WITHOUT COMPLICATION: Primary | ICD-10-CM

## 2022-06-24 LAB
CORTIS 12 AM SAL-MCNC: NORMAL NG/ML
CORTIS 8 AM SAL-MCNC: NORMAL NG/ML
CORTIS 8 PM SAL-MCNC: NORMAL NG/ML
CORTIS SAL-MCNC: <50 NG/DL

## 2022-06-24 PROCEDURE — 92015 DETERMINE REFRACTIVE STATE: CPT | Mod: S$GLB,,, | Performed by: OPTOMETRIST

## 2022-06-24 PROCEDURE — 92004 COMPRE OPH EXAM NEW PT 1/>: CPT | Mod: S$GLB,,, | Performed by: OPTOMETRIST

## 2022-06-24 PROCEDURE — 1159F PR MEDICATION LIST DOCUMENTED IN MEDICAL RECORD: ICD-10-PCS | Mod: CPTII,S$GLB,, | Performed by: OPTOMETRIST

## 2022-06-24 PROCEDURE — 3061F PR NEG MICROALBUMINURIA RESULT DOCUMENTED/REVIEW: ICD-10-PCS | Mod: CPTII,S$GLB,, | Performed by: OPTOMETRIST

## 2022-06-24 PROCEDURE — 2023F PR DILATED RETINAL EXAM W/O EVID OF RETINOPATHY: ICD-10-PCS | Mod: CPTII,S$GLB,, | Performed by: OPTOMETRIST

## 2022-06-24 PROCEDURE — 3044F PR MOST RECENT HEMOGLOBIN A1C LEVEL <7.0%: ICD-10-PCS | Mod: CPTII,S$GLB,, | Performed by: OPTOMETRIST

## 2022-06-24 PROCEDURE — 3066F NEPHROPATHY DOC TX: CPT | Mod: CPTII,S$GLB,, | Performed by: OPTOMETRIST

## 2022-06-24 PROCEDURE — 3044F HG A1C LEVEL LT 7.0%: CPT | Mod: CPTII,S$GLB,, | Performed by: OPTOMETRIST

## 2022-06-24 PROCEDURE — 1160F PR REVIEW ALL MEDS BY PRESCRIBER/CLIN PHARMACIST DOCUMENTED: ICD-10-PCS | Mod: CPTII,S$GLB,, | Performed by: OPTOMETRIST

## 2022-06-24 PROCEDURE — 99999 PR PBB SHADOW E&M-EST. PATIENT-LVL III: ICD-10-PCS | Mod: PBBFAC,,, | Performed by: OPTOMETRIST

## 2022-06-24 PROCEDURE — 99999 PR PBB SHADOW E&M-EST. PATIENT-LVL III: CPT | Mod: PBBFAC,,, | Performed by: OPTOMETRIST

## 2022-06-24 PROCEDURE — 1160F RVW MEDS BY RX/DR IN RCRD: CPT | Mod: CPTII,S$GLB,, | Performed by: OPTOMETRIST

## 2022-06-24 PROCEDURE — 1159F MED LIST DOCD IN RCRD: CPT | Mod: CPTII,S$GLB,, | Performed by: OPTOMETRIST

## 2022-06-24 PROCEDURE — 92015 PR REFRACTION: ICD-10-PCS | Mod: S$GLB,,, | Performed by: OPTOMETRIST

## 2022-06-24 PROCEDURE — 92004 PR EYE EXAM, NEW PATIENT,COMPREHESV: ICD-10-PCS | Mod: S$GLB,,, | Performed by: OPTOMETRIST

## 2022-06-24 PROCEDURE — 2023F DILAT RTA XM W/O RTNOPTHY: CPT | Mod: CPTII,S$GLB,, | Performed by: OPTOMETRIST

## 2022-06-24 PROCEDURE — 3066F PR DOCUMENTATION OF TREATMENT FOR NEPHROPATHY: ICD-10-PCS | Mod: CPTII,S$GLB,, | Performed by: OPTOMETRIST

## 2022-06-24 PROCEDURE — 3061F NEG MICROALBUMINURIA REV: CPT | Mod: CPTII,S$GLB,, | Performed by: OPTOMETRIST

## 2022-06-24 NOTE — PROGRESS NOTES
HPI     Annual Exam     Comments: Pt reports for DM annual exam. Denies any pain or irritation.   Va stable. No drops. Would like to get glasses and/or contacts.               Comments     Lab Results       Component                Value               Date                       HGBA1C                   6.7 (H)             06/20/2022                     Last edited by Wisam Pérez, OD on 6/24/2022  3:46 PM. (History)            Assessment /Plan     For exam results, see Encounter Report.    Diabetes mellitus without complication   Last A1c 6.7 Stressed importance of DM control to preserve vision. No diabetic retinopathy was seen in either eye today. Continue strict blood glucose control.  Reviewed importance of yearly dilated eye exams. Continue close care with PCP regarding diabetes.  Myopia of both eyes  Eyeglass Final Rx     Eyeglass Final Rx       Sphere Cylinder Axis    Right -1.25 DS     Left -1.25 +0.25 080    Type: SVL    Expiration Date: 6/24/2023                RTC 1 yr for dilated eye exam or sooner if any changes to vision.   Discussed above and answered questions.

## 2022-09-16 ENCOUNTER — PATIENT MESSAGE (OUTPATIENT)
Dept: FAMILY MEDICINE | Facility: CLINIC | Age: 37
End: 2022-09-16
Payer: COMMERCIAL

## 2022-09-20 ENCOUNTER — PATIENT MESSAGE (OUTPATIENT)
Dept: FAMILY MEDICINE | Facility: CLINIC | Age: 37
End: 2022-09-20
Payer: COMMERCIAL

## 2022-09-21 ENCOUNTER — PATIENT MESSAGE (OUTPATIENT)
Dept: FAMILY MEDICINE | Facility: CLINIC | Age: 37
End: 2022-09-21
Payer: COMMERCIAL

## 2022-09-21 ENCOUNTER — DOCUMENTATION ONLY (OUTPATIENT)
Dept: FAMILY MEDICINE | Facility: CLINIC | Age: 37
End: 2022-09-21
Payer: COMMERCIAL

## 2022-09-21 PROBLEM — R73.03 PREDIABETES: Status: RESOLVED | Noted: 2020-08-28 | Resolved: 2022-09-21

## 2022-09-21 NOTE — PROGRESS NOTES
All necessary documentation has been faxed to Dr. Du.  Phoned pt, left v/m msg notifying her of this.

## 2022-09-21 NOTE — TELEPHONE ENCOUNTER
See this is just in references into what we was talking about our pt's appreciate us    TAYLOR Ng

## 2022-10-10 ENCOUNTER — PATIENT MESSAGE (OUTPATIENT)
Dept: DIABETES | Facility: CLINIC | Age: 37
End: 2022-10-10
Payer: COMMERCIAL

## 2022-10-24 ENCOUNTER — OFFICE VISIT (OUTPATIENT)
Dept: DIABETES | Facility: CLINIC | Age: 37
End: 2022-10-24
Payer: COMMERCIAL

## 2022-10-24 DIAGNOSIS — G47.30 SLEEP APNEA, UNSPECIFIED TYPE: ICD-10-CM

## 2022-10-24 DIAGNOSIS — E66.01 MORBID OBESITY WITH BMI OF 50.0-59.9, ADULT: ICD-10-CM

## 2022-10-24 DIAGNOSIS — E11.9 CONTROLLED TYPE 2 DIABETES MELLITUS WITHOUT COMPLICATION, WITHOUT LONG-TERM CURRENT USE OF INSULIN: Primary | ICD-10-CM

## 2022-10-24 DIAGNOSIS — E55.9 VITAMIN D DEFICIENCY: ICD-10-CM

## 2022-10-24 PROCEDURE — 99214 OFFICE O/P EST MOD 30 MIN: CPT | Mod: 95,,, | Performed by: NURSE PRACTITIONER

## 2022-10-24 PROCEDURE — 99214 PR OFFICE/OUTPT VISIT, EST, LEVL IV, 30-39 MIN: ICD-10-PCS | Mod: 95,,, | Performed by: NURSE PRACTITIONER

## 2022-10-24 PROCEDURE — 1160F RVW MEDS BY RX/DR IN RCRD: CPT | Mod: CPTII,95,, | Performed by: NURSE PRACTITIONER

## 2022-10-24 PROCEDURE — 3044F HG A1C LEVEL LT 7.0%: CPT | Mod: CPTII,95,, | Performed by: NURSE PRACTITIONER

## 2022-10-24 PROCEDURE — 3066F PR DOCUMENTATION OF TREATMENT FOR NEPHROPATHY: ICD-10-PCS | Mod: CPTII,95,, | Performed by: NURSE PRACTITIONER

## 2022-10-24 PROCEDURE — 3044F PR MOST RECENT HEMOGLOBIN A1C LEVEL <7.0%: ICD-10-PCS | Mod: CPTII,95,, | Performed by: NURSE PRACTITIONER

## 2022-10-24 PROCEDURE — 1159F MED LIST DOCD IN RCRD: CPT | Mod: CPTII,95,, | Performed by: NURSE PRACTITIONER

## 2022-10-24 PROCEDURE — 1160F PR REVIEW ALL MEDS BY PRESCRIBER/CLIN PHARMACIST DOCUMENTED: ICD-10-PCS | Mod: CPTII,95,, | Performed by: NURSE PRACTITIONER

## 2022-10-24 PROCEDURE — 3061F NEG MICROALBUMINURIA REV: CPT | Mod: CPTII,95,, | Performed by: NURSE PRACTITIONER

## 2022-10-24 PROCEDURE — 3061F PR NEG MICROALBUMINURIA RESULT DOCUMENTED/REVIEW: ICD-10-PCS | Mod: CPTII,95,, | Performed by: NURSE PRACTITIONER

## 2022-10-24 PROCEDURE — 1159F PR MEDICATION LIST DOCUMENTED IN MEDICAL RECORD: ICD-10-PCS | Mod: CPTII,95,, | Performed by: NURSE PRACTITIONER

## 2022-10-24 PROCEDURE — 3066F NEPHROPATHY DOC TX: CPT | Mod: CPTII,95,, | Performed by: NURSE PRACTITIONER

## 2022-10-24 NOTE — PROGRESS NOTES
The patient location is: [place]  The chief complaint leading to consultation is: diabetes management follow up     Visit type: audiovisual    Face to Face time with patient: 12 minutes  25 minutes of total time spent on the encounter, which includes face to face time and non-face to face time preparing to see the patient (eg, review of tests), Obtaining and/or reviewing separately obtained history, Documenting clinical information in the electronic or other health record, Independently interpreting results (not separately reported) and communicating results to the patient/family/caregiver, or Care coordination (not separately reported).         Each patient to whom he or she provides medical services by telemedicine is:  (1) informed of the relationship between the physician and patient and the respective role of any other health care provider with respect to management of the patient; and (2) notified that he or she may decline to receive medical services by telemedicine and may withdraw from such care at any time.    Notes:      Subjective:         Patient ID: Ralph Hughes is a 37 y.o. female.  Patient's current PCP is Vinnie Dominique NP.     Chief Complaint: Diabetes Mellitus    HPI  Ralph Hughes is a 37 y.o. Black or  female presenting for a follow up for diabetes. Patient has been diagnosed with type 2 diabetes since 2022 .  Received diabetes education: [facility]- 06/2022    CURRENT DM MEDICATIONS:   Diabetes Medications               metFORMIN (GLUCOPHAGE-XR) 500 MG ER 24hr tablet Take 2 tablets (1,000 mg total) by mouth daily with dinner or evening meal.    semaglutide (OZEMPIC) 1 mg/dose (4 mg/3 mL) Inject 1 mg into the skin every 7 days.           Past failed treatment include: None    Blood glucose testing is performed sporadically. Patient is testing a few times weekly  [facility]   Preferred lab: [facility]    Any episodes of hypoglycemia? no      Complications related to diabetes: none    Her blood sugar in the clinic today was:   Lab Results   Component Value Date    POCGLU 171 (A) 06/17/2022     Ralph Hughes presents today for follow up visit to discuss diabetes management. Tolerating Ozempic well from a GI perspective. Bgs stable. Continuing to work on weight loss. She completed her eye exam between visits. Will be due for lab work in Dec- prefers to wait until she knows when her GYN appt will be. Otherwise, no questions/concerns today and feels well.     Considering bariatric surgery - working with Dr. Zhou Du in [place], [place] - has an upcoming appt with the nutritionist.    Vitamin D deficiency- Taking Vit D 25,000 International Units weekly. Due for re-check.    Sleep apnea- has received her replacement CPAP.    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    Current diet: Diabetic  Activity Level: Walking. Also has a gym membership    Lab Results   Component Value Date    HGBA1C 6.7 (H) 06/20/2022    HGBA1C 6.7 (H) 12/17/2021    HGBA1C 6.5 (H) 03/15/2021     STANDARDS OF CARE  Diabetes Management Status    Statin: Not taking  ACE/ARB: Not taking    Screening or Prevention Patient's value Goal Complete/Controlled?   HgA1C Testing and Control   Lab Results   Component Value Date    HGBA1C 6.7 (H) 06/20/2022      Annually/Less than 8% Yes     Lipid profile : 06/20/2022 Annually Yes     LDL control Lab Results   Component Value Date    LDLCALC 110.0 06/20/2022    Annually/Less than 100 mg/dl  No     Nephropathy screening Lab Results   Component Value Date    LABMICR 39.0 06/20/2022     Lab Results   Component Value Date    PROTEINUA Negative 10/06/2016     No results found for: UTPCR   Annually Yes     Blood pressure BP Readings from Last 1 Encounters:   06/17/22 (!) 140/90    Less than 140/90 Yes     Dilated retinal exam : 06/24/2022 Annually Yes     Foot exam   : 06/17/2022 Annually  Yes          Labs reviewed and are noted below.    Lab Results   Component Value Date    WBC 4.40 06/20/2022    HGB 12.1 06/20/2022    HCT 38.5 06/20/2022     06/20/2022    CHOL 154 06/20/2022    TRIG 60 06/20/2022    HDL 32 (L) 06/20/2022    LDLCALC 110.0 06/20/2022    ALT 22 06/20/2022    AST 14 06/20/2022     06/20/2022    K 4.5 06/20/2022     06/20/2022    ANIONGAP 7 (L) 06/20/2022    CREATININE 0.8 06/20/2022    ESTGFRAFRICA >60.0 06/20/2022    EGFRNONAA >60.0 06/20/2022    BUN 10 06/20/2022    CO2 29 06/20/2022    TSH 1.477 12/17/2021     06/20/2022     Lab Results   Component Value Date    T3FREE 2.6 12/17/2021    FREET4 0.88 12/17/2021    TSH 1.477 12/17/2021    TOTALTESTOST 33 12/17/2021    MLBOKBCA95 365 12/17/2021    CALCIUM 9.1 06/20/2022     No results found for: CPEPTIDE  No results found for: GLUTAMICACID  Glucose   Date Value Ref Range Status   06/20/2022 106 70 - 110 mg/dL Final     Anion Gap   Date Value Ref Range Status   06/20/2022 7 (L) 8 - 16 mmol/L Final     eGFR if    Date Value Ref Range Status   06/20/2022 >60.0 >60 mL/min/1.73 m^2 Final     eGFR if non    Date Value Ref Range Status   06/20/2022 >60.0 >60 mL/min/1.73 m^2 Final     Comment:     Calculation used to obtain the estimated glomerular filtration  rate (eGFR) is the CKD-EPI equation.          The following portions of the patient's history were reviewed and updated as appropriate: allergies, current medications, past family history, past medical history, past social history, past surgical history and problem list.    Review of patient's allergies indicates:  No Known Allergies  Social History     Socioeconomic History    Marital status:    Occupational History     Employer: [employer]   Tobacco Use    Smoking status: Never    Smokeless tobacco: Never   Substance and Sexual Activity    Alcohol use: Not Currently     Comment: occasionally    Drug  use: No    Sexual activity: Yes     Partners: Male     Birth control/protection: Surgical, I.U.D.     Comment: Mirena    Social History Narrative    She works at [employer] as a .     Social Determinants of Health     Financial Resource Strain: Low Risk     Difficulty of Paying Living Expenses: Not hard at all   Food Insecurity: No Food Insecurity    Worried About Running Out of Food in the Last Year: Never true    Ran Out of Food in the Last Year: Never true   Transportation Needs: No Transportation Needs    Lack of Transportation (Medical): No    Lack of Transportation (Non-Medical): No   Physical Activity: Insufficiently Active    Days of Exercise per Week: 3 days    Minutes of Exercise per Session: 30 min   Stress: Stress Concern Present    Feeling of Stress : To some extent   Social Connections: Unknown    Frequency of Communication with Friends and Family: More than three times a week    Frequency of Social Gatherings with Friends and Family: Twice a week    Active Member of Clubs or Organizations: No    Attends Club or Organization Meetings: Never    Marital Status:    Housing Stability: High Risk    Unable to Pay for Housing in the Last Year: Yes    Number of Places Lived in the Last Year: 1    Unstable Housing in the Last Year: No     Past Medical History:   Diagnosis Date    Herpes simplex virus (HSV) infection      REVIEW OF SYSTEMS:  Eyes No history of DR.  Cardiovascular: No history of HTN or hyperlipidemia.   GI: Tolerating Ozempic well without GI side effects   :No CKD  Neuro: No neuropathy.  PSYCH: No tobacco use.  ENDO: See HPI.        Objective:      There were no vitals filed for this visit.    RESPIRATORY: No respiratory distress  NEUROLOGIC: Not assessed- virtual visit.  PSYCHIATRIC: Alert & oriented x3. Normal mood and affect.  FOOT EXAMINATION: UTD    Assessment:       1. Controlled type 2 diabetes mellitus without complication, without long-term current use of  "insulin    2. Morbid obesity with BMI of 50.0-59.9, adult    3. Sleep apnea, unspecified type    4. Vitamin D deficiency          Plan:   Ralph was seen today for diabetes mellitus.    Diagnoses and all orders for this visit:    Controlled type 2 diabetes mellitus without complication, without long-term current use of insulin    -- Medications adjusted for today's visit include:    Continue Metformin to 2 tablets/day.     Continue Ozempic 1 mg weekly.    Morbid obesity with BMI of 50.0-59.9, adult    Continue Ozempic. Follow up with bariatric medicine as scheduled in [place].    Sleep apnea, unspecified type    Chronic- now has new CPAP.    Vitamin D deficiency    Chronic-Continue weekly supplement.    - Follow up: 3 months    Portions of this note may have been created with voice recognition software. Occasional "wrong-word" or "sound-a-like" substitutions may have occurred due to the inherent limitations of voice recognition software. Please, read the note carefully and recognize, using context, where substitutions have occurred.             Sheri Ammons,FNP-C [facility]      "

## 2022-10-24 NOTE — PATIENT INSTRUCTIONS
-- Medications adjusted for today's visit include:    Continue Metformin to 2 tablets/day.     Continue Ozempic 1 mg weekly.    -- Limit carbs to no more than 30-45 grams with each meal. Never eat carbs by themselves, always add protein. Make snacks low carb or non-carb only.    -- Start checking blood sugar 1 time daily: Fasting blood sugar, Before lunch, before supper, 2 hours after any meal, or bedtime.     -Blood sugar goals should be a fasting blood sugar between , and no blood sugars throughout the day over 180 is good, less than 160 better, less than 140 perfect.    --Carry glucose tablets/soft peppermints/regular juice or Coke product with you at all times to treat a low blood sugar episode (less than 70). If your blood sugar is between 50-70, Chew 4 tablets or drink 1/2 cup of juice or regular Coke product. If your blood sugar is below 50, double the treatment. Re-check blood sugar in 15 minutes. If still low, repeat this. Always call the clinic to give an update for any low blood sugar episodes.    --Exercise as tolerated: Goal 30 minutes/day, 5 days/week. Start slowly and increase as tolerated.    --Follow-up for your next visit in 3 months.     --Please either drop off, fax, or MyChart your readings to me as needed.

## 2022-11-28 ENCOUNTER — PATIENT MESSAGE (OUTPATIENT)
Dept: DIABETES | Facility: CLINIC | Age: 37
End: 2022-11-28
Payer: COMMERCIAL

## 2022-11-28 DIAGNOSIS — E11.9 CONTROLLED TYPE 2 DIABETES MELLITUS WITHOUT COMPLICATION, WITHOUT LONG-TERM CURRENT USE OF INSULIN: Primary | ICD-10-CM

## 2022-11-28 RX ORDER — SEMAGLUTIDE 1.34 MG/ML
0.5 INJECTION, SOLUTION SUBCUTANEOUS
Qty: 1 PEN | Refills: 5 | Status: SHIPPED | OUTPATIENT
Start: 2022-11-28 | End: 2022-12-20 | Stop reason: DRUGHIGH

## 2022-12-06 ENCOUNTER — PATIENT MESSAGE (OUTPATIENT)
Dept: DIABETES | Facility: CLINIC | Age: 37
End: 2022-12-06
Payer: COMMERCIAL

## 2022-12-20 ENCOUNTER — PATIENT MESSAGE (OUTPATIENT)
Dept: DIABETES | Facility: CLINIC | Age: 37
End: 2022-12-20
Payer: COMMERCIAL

## 2022-12-20 DIAGNOSIS — E11.9 CONTROLLED TYPE 2 DIABETES MELLITUS WITHOUT COMPLICATION, WITHOUT LONG-TERM CURRENT USE OF INSULIN: Primary | ICD-10-CM

## 2022-12-20 RX ORDER — SEMAGLUTIDE 2.68 MG/ML
2 INJECTION, SOLUTION SUBCUTANEOUS
Qty: 1 PEN | Refills: 5 | Status: SHIPPED | OUTPATIENT
Start: 2022-12-20 | End: 2023-10-10

## 2022-12-23 ENCOUNTER — PATIENT MESSAGE (OUTPATIENT)
Dept: DIABETES | Facility: CLINIC | Age: 37
End: 2022-12-23
Payer: COMMERCIAL

## 2023-02-03 ENCOUNTER — TELEPHONE (OUTPATIENT)
Dept: DIABETES | Facility: CLINIC | Age: 38
End: 2023-02-03
Payer: MEDICAID

## 2023-02-03 ENCOUNTER — PATIENT MESSAGE (OUTPATIENT)
Dept: DIABETES | Facility: CLINIC | Age: 38
End: 2023-02-03
Payer: MEDICAID

## 2023-02-03 NOTE — TELEPHONE ENCOUNTER
Spoke with patient about ozempic. Patient still had medication and wanted to know if she can purchase pen needles to finish the pen instead of throwing it away. Advised patient that she can purchase pen needles to make sure to use all medication in open before throwing away due to medication on back order

## 2023-02-03 NOTE — TELEPHONE ENCOUNTER
----- Message from Flor Lee NP sent at 2/3/2023 10:25 AM CST -----  Please give patient a call - she sent this Mychart:    Hi please call me,  I have my medication... I just have a question in reference to the needles. my new number is [phone]

## 2023-03-28 ENCOUNTER — LAB VISIT (OUTPATIENT)
Dept: LAB | Facility: HOSPITAL | Age: 38
End: 2023-03-28
Attending: NURSE PRACTITIONER
Payer: MEDICAID

## 2023-03-28 ENCOUNTER — OFFICE VISIT (OUTPATIENT)
Dept: OBSTETRICS AND GYNECOLOGY | Facility: CLINIC | Age: 38
End: 2023-03-28
Payer: MEDICAID

## 2023-03-28 VITALS
WEIGHT: 254.88 LBS | HEIGHT: 62 IN | DIASTOLIC BLOOD PRESSURE: 82 MMHG | SYSTOLIC BLOOD PRESSURE: 118 MMHG | BODY MASS INDEX: 46.9 KG/M2

## 2023-03-28 DIAGNOSIS — Z11.3 SCREEN FOR STD (SEXUALLY TRANSMITTED DISEASE): ICD-10-CM

## 2023-03-28 DIAGNOSIS — Z01.419 WELL WOMAN EXAM WITH ROUTINE GYNECOLOGICAL EXAM: Primary | ICD-10-CM

## 2023-03-28 DIAGNOSIS — Z12.4 ENCOUNTER FOR SCREENING FOR CERVICAL CANCER: ICD-10-CM

## 2023-03-28 DIAGNOSIS — E11.9 CONTROLLED TYPE 2 DIABETES MELLITUS WITHOUT COMPLICATION, WITHOUT LONG-TERM CURRENT USE OF INSULIN: ICD-10-CM

## 2023-03-28 PROCEDURE — 3008F PR BODY MASS INDEX (BMI) DOCUMENTED: ICD-10-PCS | Mod: CPTII,,, | Performed by: NURSE PRACTITIONER

## 2023-03-28 PROCEDURE — 87591 N.GONORRHOEAE DNA AMP PROB: CPT | Performed by: NURSE PRACTITIONER

## 2023-03-28 PROCEDURE — 36415 COLL VENOUS BLD VENIPUNCTURE: CPT | Performed by: NURSE PRACTITIONER

## 2023-03-28 PROCEDURE — 87389 HIV-1 AG W/HIV-1&-2 AB AG IA: CPT | Performed by: NURSE PRACTITIONER

## 2023-03-28 PROCEDURE — 3074F PR MOST RECENT SYSTOLIC BLOOD PRESSURE < 130 MM HG: ICD-10-PCS | Mod: CPTII,,, | Performed by: NURSE PRACTITIONER

## 2023-03-28 PROCEDURE — 3008F BODY MASS INDEX DOCD: CPT | Mod: CPTII,,, | Performed by: NURSE PRACTITIONER

## 2023-03-28 PROCEDURE — 1160F PR REVIEW ALL MEDS BY PRESCRIBER/CLIN PHARMACIST DOCUMENTED: ICD-10-PCS | Mod: CPTII,,, | Performed by: NURSE PRACTITIONER

## 2023-03-28 PROCEDURE — 1159F PR MEDICATION LIST DOCUMENTED IN MEDICAL RECORD: ICD-10-PCS | Mod: CPTII,,, | Performed by: NURSE PRACTITIONER

## 2023-03-28 PROCEDURE — 99213 OFFICE O/P EST LOW 20 MIN: CPT | Mod: PBBFAC | Performed by: NURSE PRACTITIONER

## 2023-03-28 PROCEDURE — 1160F RVW MEDS BY RX/DR IN RCRD: CPT | Mod: CPTII,,, | Performed by: NURSE PRACTITIONER

## 2023-03-28 PROCEDURE — 3074F SYST BP LT 130 MM HG: CPT | Mod: CPTII,,, | Performed by: NURSE PRACTITIONER

## 2023-03-28 PROCEDURE — 3072F PR LOW RISK FOR RETINOPATHY: ICD-10-PCS | Mod: CPTII,,, | Performed by: NURSE PRACTITIONER

## 2023-03-28 PROCEDURE — 87624 HPV HI-RISK TYP POOLED RSLT: CPT | Performed by: NURSE PRACTITIONER

## 2023-03-28 PROCEDURE — 88142 CYTOPATH C/V THIN LAYER: CPT | Performed by: NURSE PRACTITIONER

## 2023-03-28 PROCEDURE — 99999 PR PBB SHADOW E&M-EST. PATIENT-LVL III: ICD-10-PCS | Mod: PBBFAC,,, | Performed by: NURSE PRACTITIONER

## 2023-03-28 PROCEDURE — 99395 PR PREVENTIVE VISIT,EST,18-39: ICD-10-PCS | Mod: S$PBB,,, | Performed by: NURSE PRACTITIONER

## 2023-03-28 PROCEDURE — 83036 HEMOGLOBIN GLYCOSYLATED A1C: CPT | Performed by: NURSE PRACTITIONER

## 2023-03-28 PROCEDURE — 1159F MED LIST DOCD IN RCRD: CPT | Mod: CPTII,,, | Performed by: NURSE PRACTITIONER

## 2023-03-28 PROCEDURE — 3079F DIAST BP 80-89 MM HG: CPT | Mod: CPTII,,, | Performed by: NURSE PRACTITIONER

## 2023-03-28 PROCEDURE — 3072F LOW RISK FOR RETINOPATHY: CPT | Mod: CPTII,,, | Performed by: NURSE PRACTITIONER

## 2023-03-28 PROCEDURE — 3079F PR MOST RECENT DIASTOLIC BLOOD PRESSURE 80-89 MM HG: ICD-10-PCS | Mod: CPTII,,, | Performed by: NURSE PRACTITIONER

## 2023-03-28 PROCEDURE — 80074 ACUTE HEPATITIS PANEL: CPT | Performed by: NURSE PRACTITIONER

## 2023-03-28 PROCEDURE — 99395 PREV VISIT EST AGE 18-39: CPT | Mod: S$PBB,,, | Performed by: NURSE PRACTITIONER

## 2023-03-28 PROCEDURE — 99999 PR PBB SHADOW E&M-EST. PATIENT-LVL III: CPT | Mod: PBBFAC,,, | Performed by: NURSE PRACTITIONER

## 2023-03-28 PROCEDURE — 86592 SYPHILIS TEST NON-TREP QUAL: CPT | Performed by: NURSE PRACTITIONER

## 2023-03-28 RX ORDER — DIAPER,BRIEF,INFANT-TODD,DISP
EACH MISCELLANEOUS
COMMUNITY
Start: 2022-10-12

## 2023-03-28 RX ORDER — ERGOCALCIFEROL 1.25 MG/1
CAPSULE ORAL
COMMUNITY
Start: 2022-10-12

## 2023-03-28 NOTE — PROGRESS NOTES
Subjective:       Patient ID: Ralph Hughes is a 37 y.o. female.    Chief Complaint:  Well Woman    No LMP recorded. (Menstrual status: Birth Control).  History of Present Illness  Annual Exam-Premenopausal  Patient presents for annual exam. The patient has no complaints today. The patient is sexually active. GYN screening history: last pap: approximate date 19 and was normal. The patient wears seatbelts: yes. The patient participates in regular exercise: yes. Has the patient ever been transfused or tattooed?: yes. The patient reports that there is not domestic violence in her life.  Mirena IUD in place.  Inserted 2020.  Requesting STD screening.  Also requesting repeat hemoglobin A1c.  Last A1c 2020--6.7.    OB History    Para Term  AB Living   4 3 2 1 1 3   SAB IAB Ectopic Multiple Live Births   0 0 1 0 3      # Outcome Date GA Lbr Vipin/2nd Weight Sex Delivery Anes PTL Lv   4 Term 17 39w1d  3.26 kg (7 lb 3 oz) M CS-LTranv Spinal  WENDI   3  06/15/11 34w0d  2.155 kg (4 lb 12 oz) F CS-LTranv EPI Y WENDI   2 Term 09 40w0d  3.459 kg (7 lb 10 oz) F CS-LTranv EPI N WENDI      Complications: Fetal Intolerance   1 Ectopic                Review of Systems  Review of Systems   Constitutional:  Negative for appetite change, fatigue, fever and unexpected weight change.   Eyes:  Negative for visual disturbance.   Cardiovascular:  Negative for chest pain.   Gastrointestinal:  Negative for abdominal pain, bloating, constipation, diarrhea, nausea and vomiting.   Genitourinary:  Negative for bladder incontinence, dysmenorrhea, dyspareunia, dysuria, flank pain, frequency, genital sores, menorrhagia, menstrual problem, pelvic pain, urgency, vaginal bleeding, vaginal discharge, vaginal pain, postcoital bleeding, vaginal dryness and vaginal odor.   Integumentary:  Negative for rash, acne, mole/lesion, breast mass, nipple discharge, breast skin changes and breast tenderness.    Neurological:  Negative for syncope and headaches.   Hematological:  Negative for adenopathy. Does not bruise/bleed easily.   All other systems reviewed and are negative.  Breast: Positive for breast self exam.Negative for asymmetry, lump, mass, nipple discharge, skin changes and tenderness         Objective:      Physical Exam:   Constitutional: She is oriented to person, place, and time. She appears well-developed and well-nourished.    HENT:   Head: Normocephalic and atraumatic.    Eyes: Pupils are equal, round, and reactive to light. Conjunctivae and EOM are normal.     Cardiovascular:  Normal rate and regular rhythm.             Pulmonary/Chest: Effort normal. Right breast exhibits no inverted nipple, no mass, no nipple discharge, no skin change, no tenderness, no bleeding and no swelling. Left breast exhibits no inverted nipple, no mass, no nipple discharge, no skin change, no tenderness, no bleeding and no swelling. Breasts are symmetrical.        Abdominal: Soft. Hernia confirmed negative in the right inguinal area and confirmed negative in the left inguinal area.     Genitourinary:    Inguinal canal, vagina, uterus, right adnexa, left adnexa and rectum normal.      Pelvic exam was performed with patient supine.   The external female genitalia was normal.   Genitalia hair distrobution normal .   Labial bartholins normal.There is no rash, tenderness, lesion or injury on the right labia. There is no rash, tenderness, lesion or injury on the left labia. Cervix is normal. Right adnexum displays no mass, no tenderness and no fullness. Left adnexum displays no mass, no tenderness and no fullness. No erythema,  no vaginal discharge, bleeding, rectocele, cystocele or unspecified prolapse of vaginal walls in the vagina. Cervix exhibits no motion tenderness and no friability.    pap smear completedUterus is not tender. Normal urethral meatus.IUD strings visualized.          Musculoskeletal: Normal range of motion  and moves all extremeties.      Lymphadenopathy: No inguinal adenopathy noted on the right or left side.    Neurological: She is alert and oriented to person, place, and time.    Skin: Skin is warm and dry. No rash noted. No erythema.    Psychiatric: She has a normal mood and affect. Her behavior is normal. Judgment and thought content normal.          Assessment:     1. Well woman exam with routine gynecological exam    2. Encounter for screening for cervical cancer    3. Screen for STD (sexually transmitted disease)    4. Controlled type 2 diabetes mellitus without complication, without long-term current use of insulin              Plan:   Ralph was seen today for well woman.    Diagnoses and all orders for this visit:    Well woman exam with routine gynecological exam  -     Liquid-Based Pap Smear, Screening  -     HPV High Risk Genotypes, PCR    Encounter for screening for cervical cancer  -     Liquid-Based Pap Smear, Screening  -     HPV High Risk Genotypes, PCR    Screen for STD (sexually transmitted disease)  -     HIV 1/2 Ag/Ab (4th Gen); Future  -     RPR; Future  -     Hepatitis Panel, Acute; Future  -     C. trachomatis/N. gonorrhoeae by AMP DNA    Controlled type 2 diabetes mellitus without complication, without long-term current use of insulin  -     Hemoglobin A1C; Future      Recommend follow-up with primary care physician for routine health maintenance.  Follow up with me in 1 year for annual well woman exam.

## 2023-03-29 ENCOUNTER — PATIENT MESSAGE (OUTPATIENT)
Dept: DIABETES | Facility: CLINIC | Age: 38
End: 2023-03-29
Payer: MEDICAID

## 2023-03-29 LAB
C TRACH DNA SPEC QL NAA+PROBE: NOT DETECTED
ESTIMATED AVG GLUCOSE: 123 MG/DL (ref 68–131)
HAV IGM SERPL QL IA: NORMAL
HBA1C MFR BLD: 5.9 % (ref 4–5.6)
HBV CORE IGM SERPL QL IA: NORMAL
HBV SURFACE AG SERPL QL IA: NORMAL
HCV AB SERPL QL IA: NORMAL
HIV 1+2 AB+HIV1 P24 AG SERPL QL IA: NORMAL
N GONORRHOEA DNA SPEC QL NAA+PROBE: NOT DETECTED
RPR SER QL: NORMAL

## 2023-04-02 LAB
FINAL PATHOLOGIC DIAGNOSIS: NORMAL
Lab: NORMAL

## 2023-04-03 LAB
HPV HR 12 DNA SPEC QL NAA+PROBE: NEGATIVE
HPV16 AG SPEC QL: NEGATIVE
HPV18 DNA SPEC QL NAA+PROBE: NEGATIVE

## 2023-06-26 ENCOUNTER — PATIENT MESSAGE (OUTPATIENT)
Dept: ADMINISTRATIVE | Facility: OTHER | Age: 38
End: 2023-06-26
Payer: COMMERCIAL

## 2023-07-07 ENCOUNTER — PATIENT MESSAGE (OUTPATIENT)
Dept: INFECTIOUS DISEASES | Facility: CLINIC | Age: 38
End: 2023-07-07
Payer: COMMERCIAL

## 2023-10-05 ENCOUNTER — OFFICE VISIT (OUTPATIENT)
Dept: PODIATRY | Facility: CLINIC | Age: 38
End: 2023-10-05
Payer: MEDICAID

## 2023-10-05 VITALS — WEIGHT: 203 LBS | BODY MASS INDEX: 37.36 KG/M2 | HEIGHT: 62 IN

## 2023-10-05 DIAGNOSIS — E11.9 COMPREHENSIVE DIABETIC FOOT EXAMINATION, TYPE 2 DM, ENCOUNTER FOR: Primary | ICD-10-CM

## 2023-10-05 PROCEDURE — 3072F LOW RISK FOR RETINOPATHY: CPT | Mod: CPTII,S$PBB,, | Performed by: PODIATRIST

## 2023-10-05 PROCEDURE — 3008F BODY MASS INDEX DOCD: CPT | Mod: CPTII,S$PBB,, | Performed by: PODIATRIST

## 2023-10-05 PROCEDURE — 1159F PR MEDICATION LIST DOCUMENTED IN MEDICAL RECORD: ICD-10-PCS | Mod: CPTII,S$PBB,, | Performed by: PODIATRIST

## 2023-10-05 PROCEDURE — 3072F PR LOW RISK FOR RETINOPATHY: ICD-10-PCS | Mod: CPTII,S$PBB,, | Performed by: PODIATRIST

## 2023-10-05 PROCEDURE — 1159F MED LIST DOCD IN RCRD: CPT | Mod: CPTII,S$PBB,, | Performed by: PODIATRIST

## 2023-10-05 PROCEDURE — 3044F HG A1C LEVEL LT 7.0%: CPT | Mod: CPTII,S$PBB,, | Performed by: PODIATRIST

## 2023-10-05 PROCEDURE — 3044F PR MOST RECENT HEMOGLOBIN A1C LEVEL <7.0%: ICD-10-PCS | Mod: CPTII,S$PBB,, | Performed by: PODIATRIST

## 2023-10-05 PROCEDURE — 99213 OFFICE O/P EST LOW 20 MIN: CPT | Mod: PBBFAC | Performed by: PODIATRIST

## 2023-10-05 PROCEDURE — 3008F PR BODY MASS INDEX (BMI) DOCUMENTED: ICD-10-PCS | Mod: CPTII,S$PBB,, | Performed by: PODIATRIST

## 2023-10-05 PROCEDURE — 99203 PR OFFICE/OUTPT VISIT, NEW, LEVL III, 30-44 MIN: ICD-10-PCS | Mod: S$PBB,,, | Performed by: PODIATRIST

## 2023-10-05 PROCEDURE — 99999 PR PBB SHADOW E&M-EST. PATIENT-LVL III: ICD-10-PCS | Mod: PBBFAC,,, | Performed by: PODIATRIST

## 2023-10-05 PROCEDURE — 99203 OFFICE O/P NEW LOW 30 MIN: CPT | Mod: S$PBB,,, | Performed by: PODIATRIST

## 2023-10-05 PROCEDURE — 99999 PR PBB SHADOW E&M-EST. PATIENT-LVL III: CPT | Mod: PBBFAC,,, | Performed by: PODIATRIST

## 2023-10-10 ENCOUNTER — LAB VISIT (OUTPATIENT)
Dept: LAB | Facility: HOSPITAL | Age: 38
End: 2023-10-10
Attending: NURSE PRACTITIONER
Payer: MEDICAID

## 2023-10-10 ENCOUNTER — OFFICE VISIT (OUTPATIENT)
Dept: DIABETES | Facility: CLINIC | Age: 38
End: 2023-10-10
Payer: MEDICAID

## 2023-10-10 VITALS
BODY MASS INDEX: 37.34 KG/M2 | WEIGHT: 204.13 LBS | DIASTOLIC BLOOD PRESSURE: 82 MMHG | SYSTOLIC BLOOD PRESSURE: 131 MMHG

## 2023-10-10 DIAGNOSIS — E11.9 CONTROLLED TYPE 2 DIABETES MELLITUS WITHOUT COMPLICATION, WITHOUT LONG-TERM CURRENT USE OF INSULIN: Primary | ICD-10-CM

## 2023-10-10 DIAGNOSIS — E55.9 VITAMIN D DEFICIENCY: ICD-10-CM

## 2023-10-10 DIAGNOSIS — E11.9 CONTROLLED TYPE 2 DIABETES MELLITUS WITHOUT COMPLICATION, WITHOUT LONG-TERM CURRENT USE OF INSULIN: ICD-10-CM

## 2023-10-10 DIAGNOSIS — Z90.3 H/O GASTRIC SLEEVE: ICD-10-CM

## 2023-10-10 DIAGNOSIS — G47.30 SLEEP APNEA, UNSPECIFIED TYPE: ICD-10-CM

## 2023-10-10 LAB
25(OH)D3+25(OH)D2 SERPL-MCNC: 37 NG/ML (ref 30–96)
ALBUMIN/CREAT UR: 3.4 UG/MG (ref 0–30)
ANION GAP SERPL CALC-SCNC: 4 MMOL/L (ref 8–16)
BASOPHILS # BLD AUTO: 0.02 K/UL (ref 0–0.2)
BASOPHILS NFR BLD: 0.5 % (ref 0–1.9)
BUN SERPL-MCNC: 11 MG/DL (ref 6–20)
CALCIUM SERPL-MCNC: 9.1 MG/DL (ref 8.7–10.5)
CHLORIDE SERPL-SCNC: 107 MMOL/L (ref 95–110)
CHOLEST SERPL-MCNC: 142 MG/DL (ref 120–199)
CHOLEST/HDLC SERPL: 4.7 {RATIO} (ref 2–5)
CO2 SERPL-SCNC: 27 MMOL/L (ref 23–29)
CREAT SERPL-MCNC: 0.8 MG/DL (ref 0.5–1.4)
CREAT UR-MCNC: 236 MG/DL (ref 15–325)
DIFFERENTIAL METHOD: ABNORMAL
EOSINOPHIL # BLD AUTO: 0.1 K/UL (ref 0–0.5)
EOSINOPHIL NFR BLD: 1.9 % (ref 0–8)
ERYTHROCYTE [DISTWIDTH] IN BLOOD BY AUTOMATED COUNT: 13.3 % (ref 11.5–14.5)
EST. GFR  (NO RACE VARIABLE): >60 ML/MIN/1.73 M^2
ESTIMATED AVG GLUCOSE: 111 MG/DL (ref 68–131)
GLUCOSE SERPL-MCNC: 74 MG/DL (ref 70–110)
GLUCOSE SERPL-MCNC: 77 MG/DL (ref 70–110)
HBA1C MFR BLD: 5.5 % (ref 4–5.6)
HCT VFR BLD AUTO: 36.3 % (ref 37–48.5)
HDLC SERPL-MCNC: 30 MG/DL (ref 40–75)
HDLC SERPL: 21.1 % (ref 20–50)
HGB BLD-MCNC: 12 G/DL (ref 12–16)
IMM GRANULOCYTES # BLD AUTO: 0.01 K/UL (ref 0–0.04)
IMM GRANULOCYTES NFR BLD AUTO: 0.3 % (ref 0–0.5)
LDLC SERPL CALC-MCNC: 103.6 MG/DL (ref 63–159)
LYMPHOCYTES # BLD AUTO: 1.7 K/UL (ref 1–4.8)
LYMPHOCYTES NFR BLD: 43.7 % (ref 18–48)
MCH RBC QN AUTO: 29.4 PG (ref 27–31)
MCHC RBC AUTO-ENTMCNC: 33.1 G/DL (ref 32–36)
MCV RBC AUTO: 89 FL (ref 82–98)
MICROALBUMIN UR DL<=1MG/L-MCNC: 8 UG/ML
MONOCYTES # BLD AUTO: 0.4 K/UL (ref 0.3–1)
MONOCYTES NFR BLD: 10.6 % (ref 4–15)
NEUTROPHILS # BLD AUTO: 1.6 K/UL (ref 1.8–7.7)
NEUTROPHILS NFR BLD: 43 % (ref 38–73)
NONHDLC SERPL-MCNC: 112 MG/DL
NRBC BLD-RTO: 0 /100 WBC
PLATELET # BLD AUTO: 236 K/UL (ref 150–450)
PMV BLD AUTO: 13.2 FL (ref 9.2–12.9)
POTASSIUM SERPL-SCNC: 4.3 MMOL/L (ref 3.5–5.1)
RBC # BLD AUTO: 4.08 M/UL (ref 4–5.4)
SODIUM SERPL-SCNC: 138 MMOL/L (ref 136–145)
TRIGL SERPL-MCNC: 42 MG/DL (ref 30–150)
TSH SERPL DL<=0.005 MIU/L-ACNC: 0.49 UIU/ML (ref 0.4–4)
WBC # BLD AUTO: 3.78 K/UL (ref 3.9–12.7)

## 2023-10-10 PROCEDURE — 80048 BASIC METABOLIC PNL TOTAL CA: CPT | Performed by: NURSE PRACTITIONER

## 2023-10-10 PROCEDURE — 83036 HEMOGLOBIN GLYCOSYLATED A1C: CPT | Performed by: NURSE PRACTITIONER

## 2023-10-10 PROCEDURE — 99213 OFFICE O/P EST LOW 20 MIN: CPT | Mod: PBBFAC | Performed by: NURSE PRACTITIONER

## 2023-10-10 PROCEDURE — 3072F PR LOW RISK FOR RETINOPATHY: ICD-10-PCS | Mod: CPTII,,, | Performed by: NURSE PRACTITIONER

## 2023-10-10 PROCEDURE — 99999PBSHW POCT GLUCOSE, HAND-HELD DEVICE: ICD-10-PCS | Mod: PBBFAC,,,

## 2023-10-10 PROCEDURE — 3079F PR MOST RECENT DIASTOLIC BLOOD PRESSURE 80-89 MM HG: ICD-10-PCS | Mod: CPTII,,, | Performed by: NURSE PRACTITIONER

## 2023-10-10 PROCEDURE — 1159F PR MEDICATION LIST DOCUMENTED IN MEDICAL RECORD: ICD-10-PCS | Mod: CPTII,,, | Performed by: NURSE PRACTITIONER

## 2023-10-10 PROCEDURE — 1159F MED LIST DOCD IN RCRD: CPT | Mod: CPTII,,, | Performed by: NURSE PRACTITIONER

## 2023-10-10 PROCEDURE — 84443 ASSAY THYROID STIM HORMONE: CPT | Performed by: NURSE PRACTITIONER

## 2023-10-10 PROCEDURE — 99214 PR OFFICE/OUTPT VISIT, EST, LEVL IV, 30-39 MIN: ICD-10-PCS | Mod: S$PBB,,, | Performed by: NURSE PRACTITIONER

## 2023-10-10 PROCEDURE — 3075F SYST BP GE 130 - 139MM HG: CPT | Mod: CPTII,,, | Performed by: NURSE PRACTITIONER

## 2023-10-10 PROCEDURE — 3044F PR MOST RECENT HEMOGLOBIN A1C LEVEL <7.0%: ICD-10-PCS | Mod: CPTII,,, | Performed by: NURSE PRACTITIONER

## 2023-10-10 PROCEDURE — 3044F HG A1C LEVEL LT 7.0%: CPT | Mod: CPTII,,, | Performed by: NURSE PRACTITIONER

## 2023-10-10 PROCEDURE — 82570 ASSAY OF URINE CREATININE: CPT | Performed by: NURSE PRACTITIONER

## 2023-10-10 PROCEDURE — 3008F BODY MASS INDEX DOCD: CPT | Mod: CPTII,,, | Performed by: NURSE PRACTITIONER

## 2023-10-10 PROCEDURE — 82962 GLUCOSE BLOOD TEST: CPT | Mod: PBBFAC | Performed by: NURSE PRACTITIONER

## 2023-10-10 PROCEDURE — 99999 PR PBB SHADOW E&M-EST. PATIENT-LVL III: CPT | Mod: PBBFAC,,, | Performed by: NURSE PRACTITIONER

## 2023-10-10 PROCEDURE — 3008F PR BODY MASS INDEX (BMI) DOCUMENTED: ICD-10-PCS | Mod: CPTII,,, | Performed by: NURSE PRACTITIONER

## 2023-10-10 PROCEDURE — 36415 COLL VENOUS BLD VENIPUNCTURE: CPT | Performed by: NURSE PRACTITIONER

## 2023-10-10 PROCEDURE — 80061 LIPID PANEL: CPT | Performed by: NURSE PRACTITIONER

## 2023-10-10 PROCEDURE — 99999 PR PBB SHADOW E&M-EST. PATIENT-LVL III: ICD-10-PCS | Mod: PBBFAC,,, | Performed by: NURSE PRACTITIONER

## 2023-10-10 PROCEDURE — 1160F RVW MEDS BY RX/DR IN RCRD: CPT | Mod: CPTII,,, | Performed by: NURSE PRACTITIONER

## 2023-10-10 PROCEDURE — 3079F DIAST BP 80-89 MM HG: CPT | Mod: CPTII,,, | Performed by: NURSE PRACTITIONER

## 2023-10-10 PROCEDURE — 85025 COMPLETE CBC W/AUTO DIFF WBC: CPT | Performed by: NURSE PRACTITIONER

## 2023-10-10 PROCEDURE — 1160F PR REVIEW ALL MEDS BY PRESCRIBER/CLIN PHARMACIST DOCUMENTED: ICD-10-PCS | Mod: CPTII,,, | Performed by: NURSE PRACTITIONER

## 2023-10-10 PROCEDURE — 82306 VITAMIN D 25 HYDROXY: CPT | Performed by: NURSE PRACTITIONER

## 2023-10-10 PROCEDURE — 99214 OFFICE O/P EST MOD 30 MIN: CPT | Mod: S$PBB,,, | Performed by: NURSE PRACTITIONER

## 2023-10-10 PROCEDURE — 99999PBSHW POCT GLUCOSE, HAND-HELD DEVICE: Mod: PBBFAC,,,

## 2023-10-10 PROCEDURE — 3075F PR MOST RECENT SYSTOLIC BLOOD PRESS GE 130-139MM HG: ICD-10-PCS | Mod: CPTII,,, | Performed by: NURSE PRACTITIONER

## 2023-10-10 PROCEDURE — 3072F LOW RISK FOR RETINOPATHY: CPT | Mod: CPTII,,, | Performed by: NURSE PRACTITIONER

## 2023-10-10 NOTE — PROGRESS NOTES
Subjective:         Patient ID: Ralph Hughes is a 38 y.o. female.  Patient's current PCP is Ridge Quan MD.     Chief Complaint: Diabetes Mellitus      HPI  Ralph Hughes is a 38 y.o. Black or  female presenting for a follow up for diabetes. Patient has been diagnosed with type 2 diabetes since 2022 .  Received diabetes education: [facility]- 06/2022    CURRENT DM MEDICATIONS:   Diabetes Medications               metFORMIN (GLUCOPHAGE-XR) 500 MG ER 24hr tablet Take 2 tablets (1,000 mg total) by mouth daily with dinner or evening meal. No longer taking since gastric sleeve surgery    semaglutide (OZEMPIC) 2 mg/dose (8 mg/3 mL) PnIj Inject 2 mg into the skin every 7 days. No longer taking since gastric sleeve surgery           Past failed treatment include: None    Blood glucose testing is performed sporadically. Patient is testing a few times weekly  Meter: Did not bring to appt   Preferred lab: [facility]    Any episodes of hypoglycemia? no     Complications related to diabetes: none    Her blood sugar in the clinic today was:   Lab Results   Component Value Date    POCGLU 74 10/10/2023     Ralph Hughes presents today for follow up visit to discuss diabetes management. Between visits, completed gastric sleeve surgery (May 2023) with Dr. Du. Starting weight 269 lbs Nov 2022. Surgery weight 250 lbs. Currently off of all  diabetes meds with stable Bgs. Off CPAP. Feels much more energized. Back and knee pain also improved.     Vitamin D deficiency- Taking a daily vitamin.     Sleep apnea- as above, no longer requiring CPAP.     Current diet: Diabetic  Activity Level: Walking. Also has a gym membership    Lab Results   Component Value Date    HGBA1C 5.9 (H) 03/28/2023    HGBA1C 6.7 (H) 06/20/2022    HGBA1C 6.7 (H) 12/17/2021     STANDARDS OF CARE  Diabetes Management Status    Statin: Not taking  ACE/ARB: Not taking    Screening or Prevention Patient's value Goal  "Complete/Controlled?   HgA1C Testing and Control   Lab Results   Component Value Date    HGBA1C 5.9 (H) 03/28/2023      Annually/Less than 8% Yes   Lipid profile : 06/20/2022 Annually No   LDL control Lab Results   Component Value Date    LDLCALC 110.0 06/20/2022    Annually/Less than 100 mg/dl  No   Nephropathy screening Lab Results   Component Value Date    LABMICR 39.0 06/20/2022     Lab Results   Component Value Date    PROTEINUA Negative 10/06/2016     No results found for: "UTPCR"   Annually No   Blood pressure BP Readings from Last 1 Encounters:   10/10/23 131/82    Less than 140/90 Yes   Dilated retinal exam : 06/24/2022 Annually Yes- [facility] Summer 2023; will have a new appt in January for DM exam   Foot exam   : 10/10/2023 Annually Yes      Labs reviewed and are noted below.    Lab Results   Component Value Date    WBC 3.9 04/11/2023    HGB 12.6 04/11/2023    HCT 39.0 04/11/2023     04/11/2023    CHOL 154 06/20/2022    TRIG 60 06/20/2022    HDL 32 (L) 06/20/2022    LDLCALC 110.0 06/20/2022    ALT 22 06/20/2022    AST 14 06/20/2022     06/20/2022    K 4.5 06/20/2022     06/20/2022    ANIONGAP 7 (L) 06/20/2022    CREATININE 0.8 06/20/2022    ESTGFRAFRICA >60.0 06/20/2022    EGFRNONAA >60.0 06/20/2022    BUN 10 06/20/2022    CO2 29 06/20/2022    TSH 1.477 12/17/2021     06/20/2022     Lab Results   Component Value Date    T3FREE 2.6 12/17/2021    FREET4 0.88 12/17/2021    TSH 1.477 12/17/2021    TOTALTESTOST 33 12/17/2021    [id number] 365 12/17/2021    CALCIUM 9.1 06/20/2022     No results found for: "CPEPTIDE"  No results found for: "GLUTAMICACID"  Glucose   Date Value Ref Range Status   06/20/2022 106 70 - 110 mg/dL Final     Anion Gap   Date Value Ref Range Status   06/20/2022 7 (L) 8 - 16 mmol/L Final     eGFR if    Date Value Ref Range Status   06/20/2022 >60.0 >60 mL/min/1.73 m^2 Final     eGFR if non    Date Value Ref Range Status "   06/20/2022 >60.0 >60 mL/min/1.73 m^2 Final     Comment:     Calculation used to obtain the estimated glomerular filtration  rate (eGFR) is the CKD-EPI equation.          The following portions of the patient's history were reviewed and updated as appropriate: allergies, current medications, past family history, past medical history, past social history, past surgical history and problem list.    Review of patient's allergies indicates:  No Known Allergies  Social History     Socioeconomic History    Marital status:    Occupational History     Employer: [employer]   Tobacco Use    Smoking status: Never    Smokeless tobacco: Never   Substance and Sexual Activity    Alcohol use: Not Currently     Comment: occasionally    Drug use: No    Sexual activity: Yes     Partners: Male     Birth control/protection: Surgical, I.U.D.     Comment: Mirena    Social History Narrative    She works at [employer] as a .     Social Determinants of Health     Financial Resource Strain: Low Risk  (5/25/2022)    Overall Financial Resource Strain (CARDIA)     Difficulty of Paying Living Expenses: Not hard at all   Food Insecurity: No Food Insecurity (5/25/2022)    Hunger Vital Sign     Worried About Running Out of Food in the Last Year: Never true     Ran Out of Food in the Last Year: Never true   Transportation Needs: No Transportation Needs (5/25/2022)    PRAPARE - Transportation     Lack of Transportation (Medical): No     Lack of Transportation (Non-Medical): No   Physical Activity: Insufficiently Active (5/25/2022)    Exercise Vital Sign     Days of Exercise per Week: 3 days     Minutes of Exercise per Session: 30 min   Stress: Stress Concern Present (5/25/2022)    [facility] - Occupational Stress Questionnaire     Feeling of Stress : To some extent   Social Connections: Unknown (5/25/2022)    Social Connection and Isolation Panel [NHANES]     Frequency of  Communication with Friends and Family: More than three times a week     Frequency of Social Gatherings with Friends and Family: Twice a week     Active Member of Clubs or Organizations: No     Attends Club or Organization Meetings: Never     Marital Status:    Housing Stability: High Risk (5/25/2022)    Housing Stability Vital Sign     Unable to Pay for Housing in the Last Year: Yes     Number of Places Lived in the Last Year: 1     Unstable Housing in the Last Year: No     Past Medical History:   Diagnosis Date    Diabetes mellitus, type 2     Herpes simplex virus (HSV) infection      REVIEW OF SYSTEMS:  Eyes No history of DR.  Cardiovascular: No history of HTN or hyperlipidemia.   GI: Gastric sleeve surgery completed 05/2023.  :No CKD  Neuro: No neuropathy.  PSYCH: No tobacco use.  ENDO: See HPI.        Objective:      Vitals:    10/10/23 0842   BP: 131/82     RESPIRATORY: No respiratory distress  NEUROLOGIC: Cranial nerves II-XII grossly intact.  PSYCHIATRIC: Alert & oriented x3. Normal mood and affect.  FOOT EXAMINATION:Protective Sensation (w/ 10 gram monofilament):  Right: Intact  Left: Intact    Visual Inspection:  Normal -  Bilateral and Nails Intact - without Evidence of Foot Deformity- Bilateral    Pedal Pulses:   Right: Present  Left: Present    Posterior Tibialis Pulses:   Right:Present  Left: Present      Assessment:       1. Controlled type 2 diabetes mellitus without complication, without long-term current use of insulin    2. Sleep apnea, unspecified type    3. Vitamin D deficiency    4. H/O gastric sleeve          Plan:   Ralph was seen today for diabetes mellitus.    Diagnoses and all orders for this visit:    Controlled type 2 diabetes mellitus without complication, without long-term current use of insulin  -     POCT Glucose, Hand-Held Device  -     Basic Metabolic Panel; Future  -     CBC Auto Differential; Future  -     Hemoglobin A1C; Future  -     Lipid Panel; Future  -      "Microalbumin/Creatinine Ratio, Urine; Future  -     TSH; Future    Chronic -  Currently in remission since gastric sleeve surgery. No longer requiring medications. Check labs today. Follow up yearly for re-assessment, sooner if needed.    Sleep apnea, unspecified type    Resolved- no longer requiring.    Vitamin D deficiency  -     Vitamin D; Future    Continue daily vitamin - re-check level.     H/O gastric sleeve    Completed 05/2023 - surgery weight 250 lbs, now 204 lbs.     - Follow up:  1 year    Portions of this note may have been created with voice recognition software. Occasional "wrong-word" or "sound-a-like" substitutions may have occurred due to the inherent limitations of voice recognition software. Please, read the note carefully and recognize, using context, where substitutions have occurred.             JITENDRA Sullivan, BC-ADM  [facility]      "

## 2023-10-11 NOTE — PROGRESS NOTES
Labs are stable - A1c even better and now 5.5%, which is considered a normal A1c. Great improvement!

## 2023-12-13 ENCOUNTER — PATIENT MESSAGE (OUTPATIENT)
Dept: DIABETES | Facility: CLINIC | Age: 38
End: 2023-12-13
Payer: COMMERCIAL

## 2023-12-13 DIAGNOSIS — E11.9 CONTROLLED TYPE 2 DIABETES MELLITUS WITHOUT COMPLICATION, WITHOUT LONG-TERM CURRENT USE OF INSULIN: Primary | ICD-10-CM

## 2023-12-13 RX ORDER — SEMAGLUTIDE 0.68 MG/ML
INJECTION, SOLUTION SUBCUTANEOUS
Qty: 3 ML | Refills: 5 | Status: SHIPPED | OUTPATIENT
Start: 2023-12-13

## 2024-02-23 ENCOUNTER — PATIENT MESSAGE (OUTPATIENT)
Dept: DIABETES | Facility: CLINIC | Age: 39
End: 2024-02-23
Payer: MEDICAID

## 2024-02-23 ENCOUNTER — TELEPHONE (OUTPATIENT)
Dept: FAMILY MEDICINE | Facility: CLINIC | Age: 39
End: 2024-02-23
Payer: MEDICAID

## 2024-02-28 ENCOUNTER — PATIENT MESSAGE (OUTPATIENT)
Dept: FAMILY MEDICINE | Facility: CLINIC | Age: 39
End: 2024-02-28
Payer: MEDICAID

## 2024-03-01 ENCOUNTER — OFFICE VISIT (OUTPATIENT)
Dept: FAMILY MEDICINE | Facility: CLINIC | Age: 39
End: 2024-03-01
Payer: MEDICAID

## 2024-03-01 ENCOUNTER — LAB VISIT (OUTPATIENT)
Dept: LAB | Facility: HOSPITAL | Age: 39
End: 2024-03-01
Attending: INTERNAL MEDICINE
Payer: MEDICAID

## 2024-03-01 VITALS
OXYGEN SATURATION: 97 % | BODY MASS INDEX: 37.4 KG/M2 | TEMPERATURE: 98 F | SYSTOLIC BLOOD PRESSURE: 130 MMHG | HEART RATE: 87 BPM | HEIGHT: 62 IN | DIASTOLIC BLOOD PRESSURE: 80 MMHG | WEIGHT: 203.25 LBS

## 2024-03-01 DIAGNOSIS — E78.5 DYSLIPIDEMIA: ICD-10-CM

## 2024-03-01 DIAGNOSIS — E11.9 CONTROLLED TYPE 2 DIABETES MELLITUS WITHOUT COMPLICATION, WITHOUT LONG-TERM CURRENT USE OF INSULIN: ICD-10-CM

## 2024-03-01 DIAGNOSIS — Z23 NEED FOR VACCINATION: ICD-10-CM

## 2024-03-01 DIAGNOSIS — E11.9 CONTROLLED TYPE 2 DIABETES MELLITUS WITHOUT COMPLICATION, WITHOUT LONG-TERM CURRENT USE OF INSULIN: Primary | ICD-10-CM

## 2024-03-01 DIAGNOSIS — Z00.00 ROUTINE ADULT HEALTH MAINTENANCE: ICD-10-CM

## 2024-03-01 LAB
ESTIMATED AVG GLUCOSE: 111 MG/DL (ref 68–131)
HBA1C MFR BLD: 5.5 % (ref 4–5.6)

## 2024-03-01 PROCEDURE — 86580 TB INTRADERMAL TEST: CPT | Mod: PBBFAC,PO

## 2024-03-01 PROCEDURE — 83036 HEMOGLOBIN GLYCOSYLATED A1C: CPT | Performed by: INTERNAL MEDICINE

## 2024-03-01 PROCEDURE — 90686 IIV4 VACC NO PRSV 0.5 ML IM: CPT | Mod: PBBFAC,PO

## 2024-03-01 PROCEDURE — 86735 MUMPS ANTIBODY: CPT | Performed by: INTERNAL MEDICINE

## 2024-03-01 PROCEDURE — 99214 OFFICE O/P EST MOD 30 MIN: CPT | Mod: S$PBB,,, | Performed by: INTERNAL MEDICINE

## 2024-03-01 PROCEDURE — 86787 VARICELLA-ZOSTER ANTIBODY: CPT | Performed by: INTERNAL MEDICINE

## 2024-03-01 PROCEDURE — 99999PBSHW POCT TB SKIN TEST: Mod: PBBFAC,,,

## 2024-03-01 PROCEDURE — 86765 RUBEOLA ANTIBODY: CPT | Performed by: INTERNAL MEDICINE

## 2024-03-01 PROCEDURE — 36415 COLL VENOUS BLD VENIPUNCTURE: CPT | Mod: PO | Performed by: INTERNAL MEDICINE

## 2024-03-01 PROCEDURE — 3008F BODY MASS INDEX DOCD: CPT | Mod: CPTII,,, | Performed by: INTERNAL MEDICINE

## 2024-03-01 PROCEDURE — 99999 PR PBB SHADOW E&M-EST. PATIENT-LVL IV: CPT | Mod: PBBFAC,,, | Performed by: INTERNAL MEDICINE

## 2024-03-01 PROCEDURE — 86762 RUBELLA ANTIBODY: CPT | Performed by: INTERNAL MEDICINE

## 2024-03-01 PROCEDURE — 3075F SYST BP GE 130 - 139MM HG: CPT | Mod: CPTII,,, | Performed by: INTERNAL MEDICINE

## 2024-03-01 PROCEDURE — 99999PBSHW FLU VACCINE (QUAD) GREATER THAN OR EQUAL TO 3YO PRESERVATIVE FREE IM: Mod: PBBFAC,,,

## 2024-03-01 PROCEDURE — 1159F MED LIST DOCD IN RCRD: CPT | Mod: CPTII,,, | Performed by: INTERNAL MEDICINE

## 2024-03-01 PROCEDURE — 3079F DIAST BP 80-89 MM HG: CPT | Mod: CPTII,,, | Performed by: INTERNAL MEDICINE

## 2024-03-01 PROCEDURE — 86706 HEP B SURFACE ANTIBODY: CPT | Performed by: INTERNAL MEDICINE

## 2024-03-01 PROCEDURE — 99214 OFFICE O/P EST MOD 30 MIN: CPT | Mod: PBBFAC,PO | Performed by: INTERNAL MEDICINE

## 2024-03-01 NOTE — PROGRESS NOTES
Subjective:       Patient ID: Ralph Hughes is a 38 y.o. female.    Chief Complaint: Annual Exam, Diabetes, and Hyperlipidemia    Diabetes  Pertinent negatives for hypoglycemia include no confusion, dizziness, headaches, nervousness/anxiousness, pallor, seizures, speech difficulty or tremors. Pertinent negatives for diabetes include no chest pain, no fatigue, no polydipsia, no polyphagia, no polyuria and no weakness.   Hyperlipidemia  Pertinent negatives include no chest pain, myalgias or shortness of breath.     Past Medical History:   Diagnosis Date    Diabetes mellitus, type 2     Herpes simplex virus (HSV) infection      Past Surgical History:   Procedure Laterality Date     SECTION      x3    Ectopic Pregnacy Removal  10/13/2016    SLEEVE GASTROPLASTY N/A 2023    Dr. Du    TUBAL LIGATION      bilateral salpingectomy     Family History   Problem Relation Age of Onset    Hypertension Mother     Cancer Mother         lung    Breast cancer Neg Hx     Colon cancer Neg Hx     Ovarian cancer Neg Hx     Thrombophilia Neg Hx      Social History     Socioeconomic History    Marital status:    Occupational History     Employer: [employer]   Tobacco Use    Smoking status: Never    Smokeless tobacco: Never   Substance and Sexual Activity    Alcohol use: Not Currently     Comment: occasionally    Drug use: No    Sexual activity: Yes     Partners: Male     Birth control/protection: Surgical, I.U.D.     Comment: Mirena    Social History Narrative    She works at [employer] as a .     Social Determinants of Health     Financial Resource Strain: Low Risk  (2024)    Overall Financial Resource Strain (CARDIA)     Difficulty of Paying Living Expenses: Not hard at all   Food Insecurity: No Food Insecurity (2024)    Hunger Vital Sign     Worried About Running Out of Food in the Last Year: Never true     Ran Out of Food in the Last Year: Never true    Transportation Needs: No Transportation Needs (2/28/2024)    PRAPARE - Transportation     Lack of Transportation (Medical): No     Lack of Transportation (Non-Medical): No   Physical Activity: Sufficiently Active (2/28/2024)    Exercise Vital Sign     Days of Exercise per Week: 4 days     Minutes of Exercise per Session: 40 min   Stress: No Stress Concern Present (2/28/2024)    [employer] [place] [employer] [facility] - Occupational Stress Questionnaire     Feeling of Stress : Not at all   Social Connections: Unknown (2/28/2024)    Social Connection and Isolation Panel [NHANES]     Frequency of Communication with Friends and Family: More than three times a week     Frequency of Social Gatherings with Friends and Family: Once a week     Active Member of Clubs or Organizations: No     Attends Club or Organization Meetings: Patient declined     Marital Status:    Housing Stability: Unknown (2/28/2024)    Housing Stability Vital Sign     Unable to Pay for Housing in the Last Year: No     Unstable Housing in the Last Year: No     Review of Systems   Constitutional:  Negative for activity change, appetite change, chills, diaphoresis, fatigue, fever and unexpected weight change.   HENT:  Negative for congestion, drooling, ear discharge, ear pain, facial swelling, hearing loss, mouth sores, nosebleeds, postnasal drip, rhinorrhea, sinus pressure, sneezing, sore throat, tinnitus, trouble swallowing and voice change.    Eyes:  Negative for photophobia, redness and visual disturbance.   Respiratory:  Negative for apnea, cough, choking, chest tightness, shortness of breath and wheezing.    Cardiovascular:  Negative for chest pain, palpitations and leg swelling.   Gastrointestinal:  Negative for abdominal distention, abdominal pain, blood in stool, constipation, diarrhea, nausea, rectal pain and vomiting.   Endocrine: Negative for cold intolerance, heat intolerance, polydipsia, polyphagia and polyuria.   Genitourinary:   Negative for decreased urine volume, difficulty urinating, dysuria, flank pain, frequency, genital sores, hematuria, pelvic pain and urgency.   Musculoskeletal:  Negative for arthralgias, back pain, gait problem, joint swelling, myalgias, neck pain and neck stiffness.   Skin:  Negative for color change, pallor, rash and wound.   Allergic/Immunologic: Negative for food allergies and immunocompromised state.   Neurological:  Negative for dizziness, tremors, seizures, syncope, speech difficulty, weakness, light-headedness, numbness and headaches.   Hematological:  Negative for adenopathy. Does not bruise/bleed easily.   Psychiatric/Behavioral:  Negative for agitation, behavioral problems, confusion, decreased concentration, dysphoric mood, hallucinations, self-injury, sleep disturbance and suicidal ideas. The patient is not nervous/anxious and is not hyperactive.    All other systems reviewed and are negative.      Objective:      Physical Exam  Vitals and nursing note reviewed.   Constitutional:       General: She is not in acute distress.     Appearance: Normal appearance. She is well-developed. She is not diaphoretic.   HENT:      Head: Normocephalic and atraumatic.      Mouth/Throat:      Pharynx: No oropharyngeal exudate.   Eyes:      General: No scleral icterus.  Neck:      Thyroid: No thyromegaly.      Vascular: No carotid bruit or JVD.      Trachea: No tracheal deviation.   Cardiovascular:      Rate and Rhythm: Normal rate and regular rhythm.      Heart sounds: Normal heart sounds.   Pulmonary:      Effort: Pulmonary effort is normal. No respiratory distress.      Breath sounds: Normal breath sounds. No wheezing or rales.   Chest:      Chest wall: No tenderness.   Abdominal:      General: Bowel sounds are normal. There is no distension.      Palpations: Abdomen is soft.      Tenderness: There is no abdominal tenderness. There is no guarding or rebound.   Musculoskeletal:         General: No tenderness. Normal  range of motion.      Cervical back: Normal range of motion and neck supple.   Lymphadenopathy:      Cervical: No cervical adenopathy.   Skin:     General: Skin is warm and dry.      Coloration: Skin is not pale.      Findings: No erythema or rash.   Neurological:      Mental Status: She is alert and oriented to person, place, and time.   Psychiatric:         Behavior: Behavior normal.         Thought Content: Thought content normal.         Judgment: Judgment normal.         CMP  Sodium   Date Value Ref Range Status   10/10/2023 138 136 - 145 mmol/L Final     Potassium   Date Value Ref Range Status   10/10/2023 4.3 3.5 - 5.1 mmol/L Final     Chloride   Date Value Ref Range Status   10/10/2023 107 95 - 110 mmol/L Final     CO2   Date Value Ref Range Status   10/10/2023 27 23 - 29 mmol/L Final     Glucose   Date Value Ref Range Status   10/10/2023 77 70 - 110 mg/dL Final     BUN   Date Value Ref Range Status   10/10/2023 11 6 - 20 mg/dL Final     Creatinine   Date Value Ref Range Status   10/10/2023 0.8 0.5 - 1.4 mg/dL Final     Calcium   Date Value Ref Range Status   10/10/2023 9.1 8.7 - 10.5 mg/dL Final     Total Protein   Date Value Ref Range Status   06/20/2022 7.0 6.0 - 8.4 g/dL Final     Albumin   Date Value Ref Range Status   06/20/2022 3.6 3.5 - 5.2 g/dL Final     Total Bilirubin   Date Value Ref Range Status   06/20/2022 0.4 0.1 - 1.0 mg/dL Final     Comment:     For infants and newborns, interpretation of results should be based  on gestational age, weight and in agreement with clinical  observations.    Premature Infant recommended reference ranges:  Up to 24 hours.............<8.0 mg/dL  Up to 48 hours............<12.0 mg/dL  3-5 days..................<15.0 mg/dL  6-29 days.................<15.0 mg/dL       Alkaline Phosphatase   Date Value Ref Range Status   06/20/2022 74 55 - 135 U/L Final     AST   Date Value Ref Range Status   06/20/2022 14 10 - 40 U/L Final     ALT   Date Value Ref Range Status    06/20/2022 22 10 - 44 U/L Final     Anion Gap   Date Value Ref Range Status   10/10/2023 4 (L) 8 - 16 mmol/L Final     eGFR if    Date Value Ref Range Status   06/20/2022 >60.0 >60 mL/min/1.73 m^2 Final     eGFR if non    Date Value Ref Range Status   06/20/2022 >60.0 >60 mL/min/1.73 m^2 Final     Comment:     Calculation used to obtain the estimated glomerular filtration  rate (eGFR) is the CKD-EPI equation.        Lab Results   Component Value Date    WBC 3.78 (L) 10/10/2023    HGB 12.0 10/10/2023    HCT 36.3 (L) 10/10/2023    MCV 89 10/10/2023     10/10/2023     Lab Results   Component Value Date    CHOL 142 10/10/2023     Lab Results   Component Value Date    HDL 30 (L) 10/10/2023     Lab Results   Component Value Date    LDLCALC 103.6 10/10/2023     Lab Results   Component Value Date    TRIG 42 10/10/2023     Lab Results   Component Value Date    CHOLHDL 21.1 10/10/2023     Lab Results   Component Value Date    TSH 0.490 10/10/2023     Lab Results   Component Value Date    HGBA1C 5.5 10/10/2023     Assessment:       1. Controlled type 2 diabetes mellitus without complication, without long-term current use of insulin    2. Dyslipidemia    3. Routine adult health maintenance    4. Need for vaccination        Plan:   Controlled type 2 diabetes mellitus without complication, without long-term current use of insulin------does not take ozempic routinely------------s/p gastric sleeve surgery--------  -     Hemoglobin A1C; Future; Expected date: 03/01/2024    Dyslipidemia    Routine adult health maintenance  -     Rubella antibody, IgG; Future; Expected date: 03/01/2024  -     Rubeola antibody IgG; Future; Expected date: 03/01/2024  -     Mumps, IgG Screen; Future; Expected date: 03/01/2024  -     Hepatitis B Surface Antibody, Qual/Quant; Future; Expected date: 03/01/2024  -     Varicella zoster antibody, IgG; Future; Expected date: 03/01/2024  -     POCT TB Skin Test    Need  for vaccination  -     Influenza - Quadrivalent (PF)      Continue meds---------------as above----------f/u 6 months-------------

## 2024-03-04 ENCOUNTER — PATIENT MESSAGE (OUTPATIENT)
Dept: FAMILY MEDICINE | Facility: CLINIC | Age: 39
End: 2024-03-04

## 2024-03-04 ENCOUNTER — CLINICAL SUPPORT (OUTPATIENT)
Dept: FAMILY MEDICINE | Facility: CLINIC | Age: 39
End: 2024-03-04
Payer: MEDICAID

## 2024-03-04 DIAGNOSIS — Z00.00 ROUTINE ADULT HEALTH MAINTENANCE: Primary | ICD-10-CM

## 2024-03-04 DIAGNOSIS — Z48.89 ENCOUNTER FOR FOLLOW-UP CARE INVOLVING PLASTIC SURGERY: Primary | ICD-10-CM

## 2024-03-04 LAB
HBV SURFACE AB SER QL IA: POSITIVE
HBV SURFACE AB SERPL IA-ACNC: NORMAL MIU/ML
MUMPS IGG INTERPRETATION: POSITIVE
MUMPS IGG SCREEN: 67.9 AU/ML
RUBEOLA IGG ANTIBODY: 189 AU/ML
RUBEOLA INTERPRETATION: POSITIVE
RUBV IGG SER-ACNC: 60.4 IU/ML
RUBV IGG SER-IMP: REACTIVE
TB INDURATION - 48 HR READ: 0 MM
TB INDURATION - 72 HR READ: 0 MM
TB SKIN TEST - 48 HR READ: NEGATIVE
TB SKIN TEST - 72 HR READ: NEGATIVE
VARICELLA INTERPRETATION: POSITIVE
VARICELLA ZOSTER IGG: 1184 AU/ML

## 2024-03-11 ENCOUNTER — PATIENT MESSAGE (OUTPATIENT)
Dept: FAMILY MEDICINE | Facility: CLINIC | Age: 39
End: 2024-03-11
Payer: MEDICAID

## 2024-03-13 ENCOUNTER — PATIENT MESSAGE (OUTPATIENT)
Dept: FAMILY MEDICINE | Facility: CLINIC | Age: 39
End: 2024-03-13
Payer: MEDICAID

## 2024-03-25 ENCOUNTER — PATIENT MESSAGE (OUTPATIENT)
Dept: FAMILY MEDICINE | Facility: CLINIC | Age: 39
End: 2024-03-25
Payer: MEDICAID

## 2024-04-02 ENCOUNTER — PATIENT MESSAGE (OUTPATIENT)
Dept: FAMILY MEDICINE | Facility: CLINIC | Age: 39
End: 2024-04-02
Payer: MEDICAID

## 2024-04-03 ENCOUNTER — PATIENT MESSAGE (OUTPATIENT)
Dept: PULMONOLOGY | Facility: CLINIC | Age: 39
End: 2024-04-03
Payer: MEDICAID

## 2024-04-17 ENCOUNTER — PATIENT MESSAGE (OUTPATIENT)
Dept: FAMILY MEDICINE | Facility: CLINIC | Age: 39
End: 2024-04-17
Payer: MEDICAID

## 2024-04-17 DIAGNOSIS — Z00.00 ROUTINE ADULT HEALTH MAINTENANCE: Primary | ICD-10-CM

## 2024-04-18 ENCOUNTER — TELEPHONE (OUTPATIENT)
Dept: FAMILY MEDICINE | Facility: CLINIC | Age: 39
End: 2024-04-18
Payer: MEDICAID

## 2024-04-18 ENCOUNTER — HOSPITAL ENCOUNTER (OUTPATIENT)
Dept: RADIOLOGY | Facility: HOSPITAL | Age: 39
Discharge: HOME | End: 2024-04-18
Attending: INTERNAL MEDICINE
Payer: MEDICAID

## 2024-04-18 DIAGNOSIS — Z00.00 ROUTINE ADULT HEALTH MAINTENANCE: ICD-10-CM

## 2024-04-18 PROCEDURE — 71046 X-RAY EXAM CHEST 2 VIEWS: CPT | Mod: 26,,, | Performed by: RADIOLOGY

## 2024-04-18 PROCEDURE — 71046 X-RAY EXAM CHEST 2 VIEWS: CPT | Mod: TC

## 2024-06-03 NOTE — PROGRESS NOTES
Subjective:         Patient ID: Ralph Hughes is a 37 y.o. female.  Patient's current PCP is [facility]     Chief Complaint: Diabetes Mellitus (F/u)    HPI  Ralph Hughes is a 37 y.o. Black or  female presenting for a follow up for diabetes. Patient has been diagnosed with type 2 diabetes since 2022 .  Received diabetes education: [facility]- 06/2022    CURRENT DM MEDICATIONS:   Diabetes Medications             metFORMIN (GLUCOPHAGE-XR) 500 MG ER 24hr tablet Take 1 tablet (500 mg total) by mouth daily with dinner or evening meal. Taking 2- switched to AM dosing    semaglutide (OZEMPIC) 0.25 mg or 0.5 mg(2 mg/1.5 mL) pen injector Inject 0.5 mg into the skin every 7 days.        Past failed treatment include: None    Blood glucose testing is performed sporadically. Patient is testing a few times weekly  Meter: Did not bring to [facility]   Preferred lab: [facility]    Any episodes of hypoglycemia? no     Complications related to diabetes: none    Her blood sugar in the clinic today was:   Lab Results   Component Value Date    POCGLU 171 (A) 06/17/2022     Ralph Hughes presents today for follow up visit to discuss diabetes management. At last visit, Ozempic was initiated- tolerating well. No meter/logs. Usually in the mornings < 130, nighttime readings also <120. blood sugar of 171 today -[employer] for lunch. Feels she has learned a lot with DM education. She is tolerating 2 tablets of Metformin well. Foot exam completed today. She is overdue for eye exam- referral placed today. She prefers to do labs at the [facility].    Difficulty with losing weight- she has not completed midnight salivary cortisol test yet.    Vitamin D deficiency- Taking Vit D 25,000 International Units weekly. Due for re-check.    Sleep apnea- reports CPAP she was using has been recalled and waiting on replacement.    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    Current diet:   Activity Level: Walking. Also has a gym  membership    Lab Results   Component Value Date    HGBA1C 6.7 (H) 12/17/2021    HGBA1C 6.5 (H) 03/15/2021    HGBA1C 6.2 (H) 09/04/2020     STANDARDS OF CARE  Diabetes Management Status    Statin: Not taking  ACE/ARB: Not taking    Screening or Prevention Patient's value Goal Complete/Controlled?   HgA1C Testing and Control   Lab Results   Component Value Date    HGBA1C 6.7 (H) 12/17/2021      Annually/Less than 8% Yes   Lipid profile : 09/04/2020 Annually No   LDL control Lab Results   Component Value Date    LDLCALC 110.0 09/04/2020    Annually/Less than 100 mg/dl  No   Nephropathy screening No results found for: LABMICR  Lab Results   Component Value Date    PROTEINUA Negative 10/06/2016     No results found for: UTPCR   Annually No   Blood pressure BP Readings from Last 1 Encounters:   06/17/22 (!) 140/90    Less than 140/90 Yes   Dilated retinal exam Most Recent Eye Exam Date: Not Found Annually No Referral today    Foot exam   : 06/17/2022 Annually Yes     Labs reviewed and are noted below.    Lab Results   Component Value Date    WBC 4.51 09/04/2020    HGB 11.9 (L) 09/04/2020    HCT 38.6 09/04/2020     09/04/2020    CHOL 155 09/04/2020    TRIG 45 09/04/2020    HDL 36 (L) 09/04/2020    LDLCALC 110.0 09/04/2020    ALT 12 09/04/2020    AST 12 09/04/2020     (L) 09/04/2020    K 4.1 09/04/2020     09/04/2020    ANIONGAP 6 (L) 09/04/2020    CREATININE 0.8 09/04/2020    ESTGFRAFRICA >60.0 09/04/2020    EGFRNONAA >60.0 09/04/2020    BUN 12 09/04/2020    CO2 28 09/04/2020    TSH 1.477 12/17/2021    GLU 77 09/04/2020     Lab Results   Component Value Date    T3FREE 2.6 12/17/2021    FREET4 0.88 12/17/2021    TSH 1.477 12/17/2021    TOTALTESTOST 33 12/17/2021    WSIMVQCC77 365 12/17/2021    CALCIUM 9.0 09/04/2020     No results found for: CPEPTIDE  No results found for: GLUTAMICACID  Glucose   Date Value Ref Range Status   09/04/2020 77 70 - 110 mg/dL Final     Anion Gap   Date Value Ref Range Status    09/04/2020 6 (L) 8 - 16 mmol/L Final     eGFR if    Date Value Ref Range Status   09/04/2020 >60.0 >60 mL/min/1.73 m^2 Final     eGFR if non    Date Value Ref Range Status   09/04/2020 >60.0 >60 mL/min/1.73 m^2 Final     Comment:     Calculation used to obtain the estimated glomerular filtration  rate (eGFR) is the CKD-EPI equation.          The following portions of the patient's history were reviewed and updated as appropriate: allergies, current medications, past family history, past medical history, past social history, past surgical history and problem list.    Review of patient's allergies indicates:  No Known Allergies  Social History     Socioeconomic History    Marital status:    Occupational History     Employer: [employer]   Tobacco Use    Smoking status: Never Smoker    Smokeless tobacco: Never Used   Substance and Sexual Activity    Alcohol use: Not Currently     Comment: occasionally    Drug use: No    Sexual activity: Yes     Partners: Male     Birth control/protection: Surgical, I.U.D.     Comment: Mirena    Social History Narrative    She works at [employer] as a .     Social Determinants of Health     Financial Resource Strain: Low Risk     Difficulty of Paying Living Expenses: Not hard at all   Food Insecurity: No Food Insecurity    Worried About Running Out of Food in the Last Year: Never true    Ran Out of Food in the Last Year: Never true   Transportation Needs: No Transportation Needs    Lack of Transportation (Medical): No    Lack of Transportation (Non-Medical): No   Physical Activity: Insufficiently Active    Days of Exercise per Week: 3 days    Minutes of Exercise per Session: 30 min   Stress: Stress Concern Present    Feeling of Stress : To some extent   Social Connections: Unknown    Frequency of Communication with Friends and Family: More than three times a week    Frequency of Social  Gatherings with Friends and Family: Twice a week    Active Member of Clubs or Organizations: No    Attends Club or Organization Meetings: Never    Marital Status:    Housing Stability: High Risk    Unable to Pay for Housing in the Last Year: Yes    Number of Places Lived in the Last Year: 1    Unstable Housing in the Last Year: No     Past Medical History:   Diagnosis Date    Herpes simplex virus (HSV) infection        REVIEW OF SYSTEMS:  Eyes No history of DR.  Cardiovascular: No history of HTN or hyperlipidemia.   GI: Tolerating Ozempic well without GI side effects   :No CKD  Neuro: No neuropathy.  PSYCH: No tobacco use.  ENDO: See HPI.        Objective:      Vitals:    06/17/22 1443   BP: (!) 140/90     RESPIRATORY: No respiratory distress  NEUROLOGIC: Cranial nerves II-XII grossly intact.   PSYCHIATRIC: Alert & oriented x3. Normal mood and affect.  FOOT EXAMINATION: Protective Sensation (w/ 10 gram monofilament):  Right: Intact  Left: Intact    Visual Inspection:  Normal -  Bilateral and Nails Intact - without Evidence of Foot Deformity- Bilateral    Pedal Pulses:   Right: Present  Left: Present    Assessment:       1. Controlled type 2 diabetes mellitus without complication, without long-term current use of insulin    2. Morbid obesity with BMI of 50.0-59.9, adult    3. Vitamin D deficiency    4. Sleep apnea, unspecified type        Plan:   Controlled type 2 diabetes mellitus without complication, without long-term current use of insulin  -     POCT Glucose, Hand-Held Device  -     semaglutide (OZEMPIC) 1 mg/dose (4 mg/3 mL); Inject 1 mg into the skin every 7 days.  Dispense: 1 pen; Refill: 5  -     Ambulatory referral/consult to Ophthalmology; Future; Expected date: 06/17/2022  -     Microalbumin/Creatinine Ratio, Urine; Future; Expected date: 06/17/2022  -     Hemoglobin A1C; Future; Expected date: 06/17/2022  -     Lipid Panel; Future; Expected date: 06/17/2022  -     Comprehensive Metabolic  "Panel; Future; Expected date: 06/17/2022  -     CBC Auto Differential; Future; Expected date: 06/17/2022    Chronic,Stable- Increase Ozempic today.    Complete fasting labs as soon as able - ask for the salivary cortisol test at the lab - if [place] lab does not have it, check with the [place].    -- Medications adjusted for today's visit include:    Continue Metformin to 2 tablets/day.     Finish Ozempic 0.5 mg weekly then increase to 1 mg weekly. Let me know if you are not tolerating.    Morbid obesity with BMI of 50.0-59.9, adult    Chronic,Improving- increase Ozempic. Continue efforts at heart healthy, low carb diet with exercise as tolerated.    Vitamin D deficiency  -     Vitamin D; Future; Expected date: 06/17/2022    Chronic,below goal- Continue weekly supplementation.    Sleep apnea, unspecified type  -     Comprehensive Metabolic Panel; Future; Expected date: 06/17/2022  -     CBC Auto Differential; Future; Expected date: 06/17/2022    Chronic- check on CPAP to see when it will be replaced.    - Follow up: 3 months    Portions of this note may have been created with voice recognition software. Occasional "wrong-word" or "sound-a-like" substitutions may have occurred due to the inherent limitations of voice recognition software. Please, read the note carefully and recognize, using context, where substitutions have occurred.           Sheri Ammons,FNP-C [facility]    " No

## 2024-07-07 ENCOUNTER — PATIENT MESSAGE (OUTPATIENT)
Dept: OPHTHALMOLOGY | Facility: CLINIC | Age: 39
End: 2024-07-07
Payer: MEDICAID

## 2024-07-07 ENCOUNTER — PATIENT MESSAGE (OUTPATIENT)
Dept: FAMILY MEDICINE | Facility: CLINIC | Age: 39
End: 2024-07-07
Payer: MEDICAID

## 2024-07-17 ENCOUNTER — OFFICE VISIT (OUTPATIENT)
Dept: OPHTHALMOLOGY | Facility: CLINIC | Age: 39
End: 2024-07-17
Payer: MEDICAID

## 2024-07-17 DIAGNOSIS — H52.13 MYOPIA OF BOTH EYES: ICD-10-CM

## 2024-07-17 DIAGNOSIS — E11.9 DIABETES MELLITUS WITHOUT COMPLICATION: Primary | ICD-10-CM

## 2024-07-17 PROCEDURE — 99999 PR PBB SHADOW E&M-EST. PATIENT-LVL II: CPT | Mod: PBBFAC,,, | Performed by: OPTOMETRIST

## 2024-07-17 PROCEDURE — 92014 COMPRE OPH EXAM EST PT 1/>: CPT | Mod: S$PBB,,, | Performed by: OPTOMETRIST

## 2024-07-17 PROCEDURE — 2023F DILAT RTA XM W/O RTNOPTHY: CPT | Mod: CPTII,,, | Performed by: OPTOMETRIST

## 2024-07-17 PROCEDURE — 99212 OFFICE O/P EST SF 10 MIN: CPT | Mod: PBBFAC,PO | Performed by: OPTOMETRIST

## 2024-07-17 PROCEDURE — 92015 DETERMINE REFRACTIVE STATE: CPT | Mod: ,,, | Performed by: OPTOMETRIST

## 2024-07-17 PROCEDURE — 3044F HG A1C LEVEL LT 7.0%: CPT | Mod: CPTII,,, | Performed by: OPTOMETRIST

## 2024-07-17 PROCEDURE — 1159F MED LIST DOCD IN RCRD: CPT | Mod: CPTII,,, | Performed by: OPTOMETRIST

## 2024-07-17 NOTE — PROGRESS NOTES
HPI     Diabetic Eye Exam            Comments: Patient here today for yearly eye exam          Comments    Diagnosed with diabetes in 2020  Lab Results       Component                Value               Date                       HGBA1C                   5.5                 03/01/2024            Vision changes since last eye exam?: Yes at distance  No correction     Any eye pain today: No    Other ocular symptoms: Tearing    Interested in contact lens fitting today? No                      Last edited by Willow Norris, PCT on 7/17/2024  8:47 AM.            Assessment /Plan     For exam results, see Encounter Report.    1. Diabetes mellitus without complication  4 years, last A1c 5.5 There was no diabetic retinopathy present on either eye on examination today. Recommend good blood pressure control, strict blood glucose control, and good cholesterol control.  Continue close care with Dr. Quan regarding diabetes.    2. Myopia of both eyes  Eyeglass Final Rx       Eyeglass Final Rx         Sphere Cylinder Axis    Right -1.25 DS     Left -1.25 +0.25 080      Type: SVL    Expiration Date: 7/17/2025                     RTC 1 yr for dilated eye exam or sooner if any changes to vision.   Discussed above and answered questions.

## 2024-08-12 ENCOUNTER — PATIENT MESSAGE (OUTPATIENT)
Dept: DIABETES | Facility: CLINIC | Age: 39
End: 2024-08-12
Payer: MEDICAID

## 2024-08-12 DIAGNOSIS — E11.9 CONTROLLED TYPE 2 DIABETES MELLITUS WITHOUT COMPLICATION, WITHOUT LONG-TERM CURRENT USE OF INSULIN: ICD-10-CM

## 2024-08-12 NOTE — TELEPHONE ENCOUNTER
Called Pt to confirm what dosage of Ozempic she is currently taking ; No Answer - LVM for Pt to call the office back to confirm the dosage.

## 2024-08-13 RX ORDER — SEMAGLUTIDE 0.68 MG/ML
INJECTION, SOLUTION SUBCUTANEOUS
Qty: 3 ML | Refills: 2 | Status: SHIPPED | OUTPATIENT
Start: 2024-08-13

## 2024-08-19 ENCOUNTER — PATIENT MESSAGE (OUTPATIENT)
Dept: OPHTHALMOLOGY | Facility: CLINIC | Age: 39
End: 2024-08-19
Payer: MEDICAID

## 2024-08-29 ENCOUNTER — PATIENT MESSAGE (OUTPATIENT)
Dept: FAMILY MEDICINE | Facility: CLINIC | Age: 39
End: 2024-08-29
Payer: MEDICAID

## 2024-08-29 ENCOUNTER — OFFICE VISIT (OUTPATIENT)
Dept: URGENT CARE | Facility: CLINIC | Age: 39
End: 2024-08-29
Payer: MEDICAID

## 2024-08-29 VITALS
OXYGEN SATURATION: 99 % | WEIGHT: 213.88 LBS | HEIGHT: 63 IN | RESPIRATION RATE: 18 BRPM | HEART RATE: 68 BPM | DIASTOLIC BLOOD PRESSURE: 72 MMHG | TEMPERATURE: 99 F | SYSTOLIC BLOOD PRESSURE: 138 MMHG | BODY MASS INDEX: 37.89 KG/M2

## 2024-08-29 DIAGNOSIS — H65.92 FLUID LEVEL BEHIND TYMPANIC MEMBRANE OF LEFT EAR: ICD-10-CM

## 2024-08-29 DIAGNOSIS — R51.9 NONINTRACTABLE HEADACHE, UNSPECIFIED CHRONICITY PATTERN, UNSPECIFIED HEADACHE TYPE: Primary | ICD-10-CM

## 2024-08-29 LAB
CTP QC/QA: YES
SARS-COV-2 AG RESP QL IA.RAPID: NEGATIVE

## 2024-08-29 NOTE — PROGRESS NOTES
"Subjective:      Patient ID: Ralph Hughes is a 39 y.o. female.    Vitals:  height is 5' 3" (1.6 m) and weight is 97 kg (213 lb 13.5 oz). Her oral temperature is 99.4 °F (37.4 °C). Her blood pressure is 138/72 and her pulse is 68. Her respiration is 18 and oxygen saturation is 99%.     Chief Complaint: Headache (Started on Tuesday.)    Ralph Hughes is a 39 year old female whom presents to urgent care for evaluation of a headache which has been intermittent since Tuesday. Patient reports she started feeling "feverish" today.      Headache   This is a new problem. The current episode started in the past 7 days. The problem occurs constantly. The problem has been unchanged. The pain is located in the Frontal and temporal region. The pain does not radiate. The pain is at a severity of 6/10. The pain is moderate. Associated symptoms include a fever. Pertinent negatives include no ear pain or eye pain. She has tried nothing for the symptoms. The treatment provided no relief.       Constitution: Positive for fever.   HENT:  Negative for ear pain.    Eyes:  Negative for eye pain.   Neurological:  Positive for headaches.      Objective:     Physical Exam   Constitutional: She is oriented to person, place, and time. She appears well-developed. She is cooperative.   HENT:   Head: Normocephalic and atraumatic.   Ears:   Right Ear: Hearing, tympanic membrane, external ear and ear canal normal.   Left Ear: Hearing, tympanic membrane, external ear and ear canal normal.   Nose: Nose normal. No mucosal edema or nasal deformity. No epistaxis. Right sinus exhibits no maxillary sinus tenderness and no frontal sinus tenderness. Left sinus exhibits no maxillary sinus tenderness and no frontal sinus tenderness.   Mouth/Throat: Uvula is midline, oropharynx is clear and moist and mucous membranes are normal. Mucous membranes are moist. No trismus in the jaw. Normal dentition. No uvula swelling. Oropharynx is clear.   Bubble " noted to left TM      Comments: Bubble noted to left TM  Eyes: Conjunctivae and lids are normal.   Neck: Trachea normal and phonation normal. Neck supple.   Cardiovascular: Normal rate, regular rhythm, normal heart sounds and normal pulses.   Pulmonary/Chest: Effort normal and breath sounds normal.   Abdominal: Normal appearance and bowel sounds are normal. Soft.   Musculoskeletal: Normal range of motion.         General: Normal range of motion.   Neurological: She is alert and oriented to person, place, and time. She exhibits normal muscle tone.   Skin: Skin is warm, dry and intact.   Psychiatric: Her speech is normal and behavior is normal. Judgment and thought content normal.   Nursing note and vitals reviewed.    Results for orders placed or performed in visit on 08/29/24   SARS Coronavirus 2 Antigen, POCT Manual Read   Result Value Ref Range    SARS Coronavirus 2 Antigen Negative Negative     Acceptable Yes          Assessment:     1. Nonintractable headache, unspecified chronicity pattern, unspecified headache type    2. Fluid level behind tympanic membrane of left ear        Plan:       Nonintractable headache, unspecified chronicity pattern, unspecified headache type  -     Cancel: POCT Influenza A/B MOLECULAR  -     SARS Coronavirus 2 Antigen, POCT Manual Read    Fluid level behind tympanic membrane of left ear          Medical Decision Making:   Urgent Care Management:  Previous encounters and labs were independently reviewed. Reviewed negative COVID results with patient. Given patients presenting symptoms discussed recommendation of repeat COVID testing within 48 hours. Discussed symptomatic treatment.  Additional plan of care as outlined above.Treatment plan as well as options and alternatives reviewed and discussed with patient. Report to the nearest ER with worsening/new concerning symptoms.  All of the patients questions and concerns were addressed.The patient verbalized understanding  and agrees with the discussed plan of care. Patient remained stable and was discharged in no acute distress.             Patient Instructions   You have tested negative for COVID on our Binax test. As a follow up the recommendation is if you have symptoms within the first 7 days to retest within 48 hours. If you have NO SYMPTONS then you should test every 48 hours two more times.    OVER THE COUNTER RECOMMENDATIONS/SUGGESTIONS.  Make sure to stay well hydrated.  Use Nasal Saline to mechanically move any post nasal drip from your eustachian tube or from the back of your throat.  Use warm salt water gargles to ease your throat pain. Warm salt water gargles as needed for sore throat-  1/2 tsp salt to 1 cup warm water, gargle as desired.  Use an antihistamine such as Claritin, Zyrtec or Allegra to dry you out.   Use pseudoephedrine (behind the counter) to decongest. Pseudoephedrine  30 mg up to 240 mg /day. It can raise your blood pressure and give you palpitations.  Use mucinex (guaifenisin) to break up mucous up to 2400mg/day to loosen any mucous.   The mucinex DM pill has a cough suppressant that can be sedating. It can be used at night to stop the tickle at the back of your throat.  You can use Mucinex D (it has guaifenesin and a high dose of pseudoephedrine) in the mornings to help decongest.  Use Afrin in each nare for no longer than 3 days, as it is addictive. It can also dry out your mucous membranes and cause elevated blood pressure. This is especially useful if you are flying.  Use Flonase 1-2 sprays/nostril per day. It is a local acting steroid nasal spray, if you develop a bloody nose, stop using the medication immediately.  Sometimes Nyquil at night is beneficial to help you get some rest, however it is sedating and it does have an antihistamine, and tylenol.  Honey is a natural cough suppressant that can be used.  Tylenol up to 4,000 mg a day is safe for short periods and can be used for body aches, pain,  and fever. However in high doses and prolonged use it can cause liver irritation.  Ibuprofen is a non-steroidal anti-inflammatory that can be used for body aches, pain, and fever. However it can also cause stomach irritation if over used.     Please arrange follow up with your primary medical clinic as soon as possible. You must understand that you've received an Urgent Care treatment only and that you may be released before all of your medical problems are known or treated. You, the patient, will arrange for follow up as instructed. If your symptoms worsen or fail to improve you should go to the Emergency Room.

## 2024-08-29 NOTE — PATIENT INSTRUCTIONS
You have tested negative for COVID on our Binax test. As a follow up the recommendation is if you have symptoms within the first 7 days to retest within 48 hours. If you have NO SYMPTONS then you should test every 48 hours two more times.    OVER THE COUNTER RECOMMENDATIONS/SUGGESTIONS.  Make sure to stay well hydrated.  Use Nasal Saline to mechanically move any post nasal drip from your eustachian tube or from the back of your throat.  Use warm salt water gargles to ease your throat pain. Warm salt water gargles as needed for sore throat-  1/2 tsp salt to 1 cup warm water, gargle as desired.  Use an antihistamine such as Claritin, Zyrtec or Allegra to dry you out.   Use pseudoephedrine (behind the counter) to decongest. Pseudoephedrine  30 mg up to 240 mg /day. It can raise your blood pressure and give you palpitations.  Use mucinex (guaifenisin) to break up mucous up to 2400mg/day to loosen any mucous.   The mucinex DM pill has a cough suppressant that can be sedating. It can be used at night to stop the tickle at the back of your throat.  You can use Mucinex D (it has guaifenesin and a high dose of pseudoephedrine) in the mornings to help decongest.  Use Afrin in each nare for no longer than 3 days, as it is addictive. It can also dry out your mucous membranes and cause elevated blood pressure. This is especially useful if you are flying.  Use Flonase 1-2 sprays/nostril per day. It is a local acting steroid nasal spray, if you develop a bloody nose, stop using the medication immediately.  Sometimes Nyquil at night is beneficial to help you get some rest, however it is sedating and it does have an antihistamine, and tylenol.  Honey is a natural cough suppressant that can be used.  Tylenol up to 4,000 mg a day is safe for short periods and can be used for body aches, pain, and fever. However in high doses and prolonged use it can cause liver irritation.  Ibuprofen is a non-steroidal anti-inflammatory that can be  used for body aches, pain, and fever. However it can also cause stomach irritation if over used.     Please arrange follow up with your primary medical clinic as soon as possible. You must understand that you've received an Urgent Care treatment only and that you may be released before all of your medical problems are known or treated. You, the patient, will arrange for follow up as instructed. If your symptoms worsen or fail to improve you should go to the Emergency Room.

## 2024-08-29 NOTE — LETTER
August 29, 2024      [facility]  [address]  [facility] [address]  Phone: [phone]       Patient: Ralph Hughes   YOB: 1985  Date of Visit: 08/29/2024    To Whom It May Concern:    Parish Hughes  was at [facility] on 08/29/2024. The patient may return to work/school on 08/30/24 with no restrictions. If you have any questions or concerns, or if I can be of further assistance, please do not hesitate to contact me.    Sincerely,        Warren Schilling NP

## 2024-09-12 DIAGNOSIS — E11.9 TYPE 2 DIABETES MELLITUS WITHOUT COMPLICATION: ICD-10-CM

## 2024-10-11 ENCOUNTER — OFFICE VISIT (OUTPATIENT)
Dept: FAMILY MEDICINE | Facility: CLINIC | Age: 39
End: 2024-10-11
Payer: COMMERCIAL

## 2024-10-11 VITALS
HEIGHT: 63 IN | DIASTOLIC BLOOD PRESSURE: 80 MMHG | OXYGEN SATURATION: 100 % | BODY MASS INDEX: 38.21 KG/M2 | TEMPERATURE: 96 F | SYSTOLIC BLOOD PRESSURE: 138 MMHG | WEIGHT: 215.63 LBS | HEART RATE: 74 BPM

## 2024-10-11 DIAGNOSIS — J45.909 ASTHMA IN ADULT WITHOUT COMPLICATION, UNSPECIFIED ASTHMA SEVERITY, UNSPECIFIED WHETHER PERSISTENT: ICD-10-CM

## 2024-10-11 DIAGNOSIS — E78.5 DYSLIPIDEMIA: Primary | ICD-10-CM

## 2024-10-11 DIAGNOSIS — Z13.39 ADHD (ATTENTION DEFICIT HYPERACTIVITY DISORDER) EVALUATION: ICD-10-CM

## 2024-10-11 DIAGNOSIS — R41.840 LACK OF CONCENTRATION: ICD-10-CM

## 2024-10-11 DIAGNOSIS — Z00.00 ROUTINE ADULT HEALTH MAINTENANCE: ICD-10-CM

## 2024-10-11 DIAGNOSIS — E11.9 CONTROLLED TYPE 2 DIABETES MELLITUS WITHOUT COMPLICATION, WITHOUT LONG-TERM CURRENT USE OF INSULIN: ICD-10-CM

## 2024-10-11 PROCEDURE — 99999 PR PBB SHADOW E&M-EST. PATIENT-LVL IV: CPT | Mod: PBBFAC,,, | Performed by: INTERNAL MEDICINE

## 2024-10-11 NOTE — PROGRESS NOTES
Subjective:       Patient ID: Ralph Hughes is a 39 y.o. female.    Chief Complaint: 6 month f/u, Hyperlipidemia, and Diabetes    Hyperlipidemia  Pertinent negatives include no chest pain, myalgias or shortness of breath.   Diabetes  Pertinent negatives for hypoglycemia include no confusion, dizziness, headaches, nervousness/anxiousness, pallor, seizures, speech difficulty or tremors. Pertinent negatives for diabetes include no chest pain, no fatigue, no polydipsia, no polyphagia, no polyuria and no weakness.     Past Medical History:   Diagnosis Date    Diabetes mellitus, type 2     Herpes simplex virus (HSV) infection      Past Surgical History:   Procedure Laterality Date     SECTION      x3    Ectopic Pregnacy Removal  10/13/2016    SLEEVE GASTROPLASTY N/A 2023    Dr. Du    TUBAL LIGATION      bilateral salpingectomy     Family History   Problem Relation Name Age of Onset    Hypertension Mother      Cancer Mother          lung    Breast cancer Neg Hx      Colon cancer Neg Hx      Ovarian cancer Neg Hx      Thrombophilia Neg Hx       Social History     Socioeconomic History    Marital status:    Occupational History     Employer: [employer]   Tobacco Use    Smoking status: Never    Smokeless tobacco: Never   Substance and Sexual Activity    Alcohol use: Not Currently     Comment: occasionally    Drug use: No    Sexual activity: Yes     Partners: Male     Birth control/protection: Surgical, I.U.D.     Comment: Mirena    Social History Narrative    She works at [employer] as a .     Social Drivers of Health     Financial Resource Strain: Low Risk  (2024)    Overall Financial Resource Strain (CARDIA)     Difficulty of Paying Living Expenses: Not hard at all   Food Insecurity: No Food Insecurity (2024)    Hunger Vital Sign     Worried About Running Out of Food in the Last Year: Never true     Ran Out of Food in the Last Year: Never true    Transportation Needs: No Transportation Needs (2/28/2024)    PRAPARE - Transportation     Lack of Transportation (Medical): No     Lack of Transportation (Non-Medical): No   Physical Activity: Sufficiently Active (2/28/2024)    Exercise Vital Sign     Days of Exercise per Week: 4 days     Minutes of Exercise per Session: 40 min   Stress: No Stress Concern Present (2/28/2024)    [employer] - Occupational Stress Questionnaire     Feeling of Stress : Not at all   Housing Stability: Unknown (2/28/2024)    Housing Stability Vital Sign     Unable to Pay for Housing in the Last Year: No     Unstable Housing in the Last Year: No     Review of Systems   Constitutional:  Negative for activity change, appetite change, chills, diaphoresis, fatigue, fever and unexpected weight change.   HENT:  Negative for congestion, drooling, ear discharge, ear pain, facial swelling, hearing loss, mouth sores, nosebleeds, postnasal drip, rhinorrhea, sinus pressure, sneezing, sore throat, tinnitus, trouble swallowing and voice change.    Eyes:  Negative for photophobia, redness and visual disturbance.   Respiratory:  Negative for apnea, cough, choking, chest tightness, shortness of breath and wheezing.    Cardiovascular:  Negative for chest pain, palpitations and leg swelling.   Gastrointestinal:  Negative for abdominal distention, abdominal pain, blood in stool, constipation, diarrhea, nausea, rectal pain and vomiting.   Endocrine: Negative for cold intolerance, heat intolerance, polydipsia, polyphagia and polyuria.   Genitourinary:  Negative for decreased urine volume, difficulty urinating, dysuria, flank pain, frequency, genital sores, hematuria, pelvic pain, urgency and vaginal discharge.   Musculoskeletal:  Negative for arthralgias, back pain, gait problem, joint swelling, myalgias, neck pain and neck stiffness.   Skin:  Negative for color change, pallor, rash and wound.   Allergic/Immunologic: Negative for  food allergies and immunocompromised state.   Neurological:  Negative for dizziness, tremors, seizures, syncope, speech difficulty, weakness, light-headedness, numbness and headaches.   Hematological:  Negative for adenopathy. Does not bruise/bleed easily.   Psychiatric/Behavioral:  Positive for decreased concentration. Negative for agitation, behavioral problems, confusion, dysphoric mood, hallucinations, self-injury, sleep disturbance and suicidal ideas. The patient is not nervous/anxious and is not hyperactive.    All other systems reviewed and are negative.      Objective:      Physical Exam  Vitals and nursing note reviewed.   Constitutional:       General: She is not in acute distress.     Appearance: Normal appearance. She is well-developed. She is not diaphoretic.   HENT:      Head: Normocephalic and atraumatic.      Mouth/Throat:      Pharynx: No oropharyngeal exudate.   Eyes:      General: No scleral icterus.  Neck:      Thyroid: No thyromegaly.      Vascular: No carotid bruit or JVD.      Trachea: No tracheal deviation.   Cardiovascular:      Rate and Rhythm: Normal rate and regular rhythm.      Heart sounds: Normal heart sounds.   Pulmonary:      Effort: Pulmonary effort is normal. No respiratory distress.      Breath sounds: Normal breath sounds. No wheezing or rales.   Chest:      Chest wall: No tenderness.   Abdominal:      General: Bowel sounds are normal. There is no distension.      Palpations: Abdomen is soft.      Tenderness: There is no abdominal tenderness. There is no guarding or rebound.   Musculoskeletal:         General: No tenderness. Normal range of motion.      Cervical back: Normal range of motion and neck supple.   Lymphadenopathy:      Cervical: No cervical adenopathy.   Skin:     General: Skin is warm and dry.      Coloration: Skin is not pale.      Findings: No erythema or rash.   Neurological:      Mental Status: She is alert and oriented to person, place, and time.      Cranial  Nerves: No cranial nerve deficit.      Coordination: Coordination normal.   Psychiatric:         Behavior: Behavior normal.         Thought Content: Thought content normal.         Judgment: Judgment normal.         CMP  Sodium   Date Value Ref Range Status   10/10/2023 138 136 - 145 mmol/L Final     Potassium   Date Value Ref Range Status   10/10/2023 4.3 3.5 - 5.1 mmol/L Final     Chloride   Date Value Ref Range Status   10/10/2023 107 95 - 110 mmol/L Final     CO2   Date Value Ref Range Status   10/10/2023 27 23 - 29 mmol/L Final     Glucose   Date Value Ref Range Status   10/10/2023 77 70 - 110 mg/dL Final     BUN   Date Value Ref Range Status   10/10/2023 11 6 - 20 mg/dL Final     Creatinine   Date Value Ref Range Status   10/10/2023 0.8 0.5 - 1.4 mg/dL Final     Calcium   Date Value Ref Range Status   10/10/2023 9.1 8.7 - 10.5 mg/dL Final     Total Protein   Date Value Ref Range Status   06/20/2022 7.0 6.0 - 8.4 g/dL Final     Albumin   Date Value Ref Range Status   06/20/2022 3.6 3.5 - 5.2 g/dL Final     Total Bilirubin   Date Value Ref Range Status   06/20/2022 0.4 0.1 - 1.0 mg/dL Final     Comment:     For infants and newborns, interpretation of results should be based  on gestational age, weight and in agreement with clinical  observations.    Premature Infant recommended reference ranges:  Up to 24 hours.............<8.0 mg/dL  Up to 48 hours............<12.0 mg/dL  3-5 days..................<15.0 mg/dL  6-29 days.................<15.0 mg/dL       Alkaline Phosphatase   Date Value Ref Range Status   06/20/2022 74 55 - 135 U/L Final     AST   Date Value Ref Range Status   06/20/2022 14 10 - 40 U/L Final     ALT   Date Value Ref Range Status   06/20/2022 22 10 - 44 U/L Final     Anion Gap   Date Value Ref Range Status   10/10/2023 4 (L) 8 - 16 mmol/L Final     eGFR if    Date Value Ref Range Status   06/20/2022 >60.0 >60 mL/min/1.73 m^2 Final     eGFR if non    Date Value Ref  Range Status   06/20/2022 >60.0 >60 mL/min/1.73 m^2 Final     Comment:     Calculation used to obtain the estimated glomerular filtration  rate (eGFR) is the CKD-EPI equation.        Lab Results   Component Value Date    WBC 3.78 (L) 10/10/2023    HGB 12.0 10/10/2023    HCT 36.3 (L) 10/10/2023    MCV 89 10/10/2023     10/10/2023     Lab Results   Component Value Date    CHOL 142 10/10/2023     Lab Results   Component Value Date    HDL 30 (L) 10/10/2023     Lab Results   Component Value Date    LDLCALC 103.6 10/10/2023     Lab Results   Component Value Date    TRIG 42 10/10/2023     Lab Results   Component Value Date    CHOLHDL 21.1 10/10/2023     Lab Results   Component Value Date    TSH 0.490 10/10/2023     Lab Results   Component Value Date    HGBA1C 5.5 03/01/2024     Assessment:       1. Dyslipidemia    2. Controlled type 2 diabetes mellitus without complication, without long-term current use of insulin    3. Asthma in adult without complication, unspecified asthma severity, unspecified whether persistent    4. Lack of concentration    5. ADHD (attention deficit hyperactivity disorder) evaluation    6. Routine adult health maintenance        Plan:   Dyslipidemia    Controlled type 2 diabetes mellitus without complication, without long-term current use of insulin    Asthma in adult without complication, unspecified asthma severity, unspecified whether persistent    Lack of concentration    ADHD (attention deficit hyperactivity disorder) evaluation  -     Ambulatory referral/consult to Adult Neuropsychology; Future; Expected date: 10/18/2024    Routine adult health maintenance  -     Comprehensive Metabolic Panel; Future; Expected date: 10/11/2024  -     CBC Auto Differential; Future; Expected date: 10/11/2024  -     Lipid Panel; Future; Expected date: 10/11/2024  -     TSH; Future; Expected date: 10/11/2024  -     Hemoglobin A1C; Future; Expected date: 10/11/2024      Stable---------continue meds, watch  diet,exercise------------------as above--------------------f/u 6 months---------------

## 2024-10-14 ENCOUNTER — PATIENT OUTREACH (OUTPATIENT)
Dept: ADMINISTRATIVE | Facility: HOSPITAL | Age: 39
End: 2024-10-14
Payer: COMMERCIAL

## 2024-10-14 ENCOUNTER — TELEPHONE (OUTPATIENT)
Dept: FAMILY MEDICINE | Facility: CLINIC | Age: 39
End: 2024-10-14
Payer: COMMERCIAL

## 2024-10-14 DIAGNOSIS — E11.9 CONTROLLED TYPE 2 DIABETES MELLITUS WITHOUT COMPLICATION, WITHOUT LONG-TERM CURRENT USE OF INSULIN: Primary | ICD-10-CM

## 2024-10-14 NOTE — PROGRESS NOTES
[facility] LABS: per chart review pt overdue for HA1C, Lipid, Microalbumin, CMP, I linked micro to lab appt 10.15.24

## 2024-10-14 NOTE — TELEPHONE ENCOUNTER
----- Message from Silver sent at 10/14/2024 12:30 PM CDT -----  Regarding: Referral update  Neuropsychology is in receipt of the referral. Dr. Jade typically sees adults with identified or suspected neurodegenerative illness, brain injury, epilepsy, serious mental illness, etc. The referral in question; is this to address memory loss or cognitive concerns. If not perhaps Dr. Parvin Blas would be a better referral source     Thank you  Silver BRYAN  Neuropsychology-Access Navigator

## 2024-10-15 ENCOUNTER — LAB VISIT (OUTPATIENT)
Dept: LAB | Facility: HOSPITAL | Age: 39
End: 2024-10-15
Attending: INTERNAL MEDICINE
Payer: COMMERCIAL

## 2024-10-15 DIAGNOSIS — Z00.00 ROUTINE ADULT HEALTH MAINTENANCE: ICD-10-CM

## 2024-10-15 LAB
ALBUMIN SERPL BCP-MCNC: 3.5 G/DL (ref 3.5–5.2)
ALP SERPL-CCNC: 67 U/L (ref 55–135)
ALT SERPL W/O P-5'-P-CCNC: 11 U/L (ref 10–44)
ANION GAP SERPL CALC-SCNC: 8 MMOL/L (ref 8–16)
AST SERPL-CCNC: 13 U/L (ref 10–40)
BASOPHILS # BLD AUTO: 0.02 K/UL (ref 0–0.2)
BASOPHILS NFR BLD: 0.5 % (ref 0–1.9)
BILIRUB SERPL-MCNC: 0.7 MG/DL (ref 0.1–1)
BUN SERPL-MCNC: 11 MG/DL (ref 6–20)
CALCIUM SERPL-MCNC: 9.1 MG/DL (ref 8.7–10.5)
CHLORIDE SERPL-SCNC: 105 MMOL/L (ref 95–110)
CHOLEST SERPL-MCNC: 156 MG/DL (ref 120–199)
CHOLEST/HDLC SERPL: 4.7 {RATIO} (ref 2–5)
CO2 SERPL-SCNC: 24 MMOL/L (ref 23–29)
CREAT SERPL-MCNC: 0.8 MG/DL (ref 0.5–1.4)
DIFFERENTIAL METHOD BLD: ABNORMAL
EOSINOPHIL # BLD AUTO: 0.1 K/UL (ref 0–0.5)
EOSINOPHIL NFR BLD: 1.2 % (ref 0–8)
ERYTHROCYTE [DISTWIDTH] IN BLOOD BY AUTOMATED COUNT: 12.3 % (ref 11.5–14.5)
EST. GFR  (NO RACE VARIABLE): >60 ML/MIN/1.73 M^2
ESTIMATED AVG GLUCOSE: 105 MG/DL (ref 68–131)
GLUCOSE SERPL-MCNC: 78 MG/DL (ref 70–110)
HBA1C MFR BLD: 5.3 % (ref 4–5.6)
HCT VFR BLD AUTO: 36.4 % (ref 37–48.5)
HDLC SERPL-MCNC: 33 MG/DL (ref 40–75)
HDLC SERPL: 21.2 % (ref 20–50)
HGB BLD-MCNC: 12.2 G/DL (ref 12–16)
IMM GRANULOCYTES # BLD AUTO: 0.01 K/UL (ref 0–0.04)
IMM GRANULOCYTES NFR BLD AUTO: 0.2 % (ref 0–0.5)
LDLC SERPL CALC-MCNC: 113 MG/DL (ref 63–159)
LYMPHOCYTES # BLD AUTO: 1.7 K/UL (ref 1–4.8)
LYMPHOCYTES NFR BLD: 42.9 % (ref 18–48)
MCH RBC QN AUTO: 30 PG (ref 27–31)
MCHC RBC AUTO-ENTMCNC: 33.5 G/DL (ref 32–36)
MCV RBC AUTO: 90 FL (ref 82–98)
MONOCYTES # BLD AUTO: 0.5 K/UL (ref 0.3–1)
MONOCYTES NFR BLD: 11.8 % (ref 4–15)
NEUTROPHILS # BLD AUTO: 1.8 K/UL (ref 1.8–7.7)
NEUTROPHILS NFR BLD: 43.4 % (ref 38–73)
NONHDLC SERPL-MCNC: 123 MG/DL
NRBC BLD-RTO: 0 /100 WBC
PLATELET # BLD AUTO: 199 K/UL (ref 150–450)
PMV BLD AUTO: 13 FL (ref 9.2–12.9)
POTASSIUM SERPL-SCNC: 4.3 MMOL/L (ref 3.5–5.1)
PROT SERPL-MCNC: 6.9 G/DL (ref 6–8.4)
RBC # BLD AUTO: 4.06 M/UL (ref 4–5.4)
SODIUM SERPL-SCNC: 137 MMOL/L (ref 136–145)
TRIGL SERPL-MCNC: 50 MG/DL (ref 30–150)
TSH SERPL DL<=0.005 MIU/L-ACNC: 0.84 UIU/ML (ref 0.4–4)
WBC # BLD AUTO: 4.06 K/UL (ref 3.9–12.7)

## 2024-10-15 PROCEDURE — 80061 LIPID PANEL: CPT | Performed by: INTERNAL MEDICINE

## 2024-10-15 PROCEDURE — 36415 COLL VENOUS BLD VENIPUNCTURE: CPT | Mod: PO | Performed by: INTERNAL MEDICINE

## 2024-10-15 PROCEDURE — 83036 HEMOGLOBIN GLYCOSYLATED A1C: CPT | Performed by: INTERNAL MEDICINE

## 2024-10-15 PROCEDURE — 80053 COMPREHEN METABOLIC PANEL: CPT | Performed by: INTERNAL MEDICINE

## 2024-10-15 PROCEDURE — 85025 COMPLETE CBC W/AUTO DIFF WBC: CPT | Performed by: INTERNAL MEDICINE

## 2024-10-15 PROCEDURE — 84443 ASSAY THYROID STIM HORMONE: CPT | Performed by: INTERNAL MEDICINE

## 2024-10-16 ENCOUNTER — PATIENT MESSAGE (OUTPATIENT)
Dept: FAMILY MEDICINE | Facility: CLINIC | Age: 39
End: 2024-10-16
Payer: COMMERCIAL

## 2024-12-03 ENCOUNTER — PATIENT MESSAGE (OUTPATIENT)
Dept: FAMILY MEDICINE | Facility: CLINIC | Age: 39
End: 2024-12-03
Payer: COMMERCIAL

## 2024-12-03 DIAGNOSIS — Z00.00 ROUTINE ADULT HEALTH MAINTENANCE: Primary | ICD-10-CM

## 2025-03-20 ENCOUNTER — TELEPHONE (OUTPATIENT)
Dept: DIABETES | Facility: CLINIC | Age: 40
End: 2025-03-20
Payer: COMMERCIAL

## 2025-03-20 DIAGNOSIS — E11.9 CONTROLLED TYPE 2 DIABETES MELLITUS WITHOUT COMPLICATION, WITHOUT LONG-TERM CURRENT USE OF INSULIN: ICD-10-CM

## 2025-04-29 ENCOUNTER — TELEPHONE (OUTPATIENT)
Dept: DIABETES | Facility: CLINIC | Age: 40
End: 2025-04-29
Payer: COMMERCIAL

## 2025-04-29 NOTE — TELEPHONE ENCOUNTER
----- Message from KOLTON Laird sent at 4/29/2025 11:22 AM CDT -----  Regarding: FW: Ralph    ----- Message -----  From: Mary Lou Dugan  Sent: 4/29/2025  10:54 AM CDT  To: [facility] Diabetes Education Clinical Support Sta#  Subject: Ralph                                          Who called:Carlita is the request in detail:Patient was seeing Flor Lee NP and wants to know who took her place and patient wants to if there are any recommendations Can the clinic reply by [facility]? NoWould the patient rather a call back or a response via [facility]? callbackBe call back number:.[phone] Information:

## 2025-05-07 PROBLEM — E66.812 CLASS 2 SEVERE OBESITY WITH SERIOUS COMORBIDITY AND BODY MASS INDEX (BMI) OF 38.0 TO 38.9 IN ADULT: Status: ACTIVE | Noted: 2019-08-29

## 2025-05-07 PROBLEM — E11.69 HYPERLIPIDEMIA ASSOCIATED WITH TYPE 2 DIABETES MELLITUS: Status: ACTIVE | Noted: 2024-03-01

## 2025-05-07 PROBLEM — E66.01 CLASS 2 SEVERE OBESITY WITH SERIOUS COMORBIDITY AND BODY MASS INDEX (BMI) OF 38.0 TO 38.9 IN ADULT: Status: ACTIVE | Noted: 2019-08-29

## 2025-05-08 ENCOUNTER — OFFICE VISIT (OUTPATIENT)
Dept: FAMILY MEDICINE | Facility: CLINIC | Age: 40
End: 2025-05-08
Payer: COMMERCIAL

## 2025-05-08 ENCOUNTER — LAB VISIT (OUTPATIENT)
Dept: LAB | Facility: HOSPITAL | Age: 40
End: 2025-05-08
Attending: INTERNAL MEDICINE
Payer: COMMERCIAL

## 2025-05-08 VITALS
TEMPERATURE: 97 F | HEART RATE: 77 BPM | SYSTOLIC BLOOD PRESSURE: 128 MMHG | DIASTOLIC BLOOD PRESSURE: 84 MMHG | OXYGEN SATURATION: 98 % | BODY MASS INDEX: 36.99 KG/M2 | WEIGHT: 208.75 LBS | HEIGHT: 63 IN

## 2025-05-08 DIAGNOSIS — E78.5 HYPERLIPIDEMIA ASSOCIATED WITH TYPE 2 DIABETES MELLITUS: ICD-10-CM

## 2025-05-08 DIAGNOSIS — E11.69 HYPERLIPIDEMIA ASSOCIATED WITH TYPE 2 DIABETES MELLITUS: ICD-10-CM

## 2025-05-08 DIAGNOSIS — J45.909 ASTHMA IN ADULT WITHOUT COMPLICATION, UNSPECIFIED ASTHMA SEVERITY, UNSPECIFIED WHETHER PERSISTENT: ICD-10-CM

## 2025-05-08 DIAGNOSIS — Z00.00 ROUTINE ADULT HEALTH MAINTENANCE: ICD-10-CM

## 2025-05-08 DIAGNOSIS — E11.9 CONTROLLED TYPE 2 DIABETES MELLITUS WITHOUT COMPLICATION, WITHOUT LONG-TERM CURRENT USE OF INSULIN: Primary | ICD-10-CM

## 2025-05-08 DIAGNOSIS — Z90.3 H/O GASTRIC SLEEVE: ICD-10-CM

## 2025-05-08 DIAGNOSIS — E11.9 CONTROLLED TYPE 2 DIABETES MELLITUS WITHOUT COMPLICATION, WITHOUT LONG-TERM CURRENT USE OF INSULIN: ICD-10-CM

## 2025-05-08 LAB
EAG (OHS): 108 MG/DL (ref 68–131)
HBA1C MFR BLD: 5.4 % (ref 4–5.6)

## 2025-05-08 PROCEDURE — 36415 COLL VENOUS BLD VENIPUNCTURE: CPT | Mod: PO

## 2025-05-08 PROCEDURE — 99999 PR PBB SHADOW E&M-EST. PATIENT-LVL IV: CPT | Mod: PBBFAC,,, | Performed by: INTERNAL MEDICINE

## 2025-05-08 PROCEDURE — 83036 HEMOGLOBIN GLYCOSYLATED A1C: CPT

## 2025-05-08 RX ORDER — RISANKIZUMAB-RZAA 150 MG/ML
150 INJECTION SUBCUTANEOUS
COMMUNITY

## 2025-05-08 NOTE — PROGRESS NOTES
Subjective:       Patient ID: Ralph Hughes is a 39 y.o. female.    Chief Complaint: Follow-up (6 month) and Diabetes    Follow-up  Pertinent negatives include no abdominal pain, arthralgias, chest pain, chills, congestion, coughing, diaphoresis, fatigue, fever, headaches, joint swelling, myalgias, nausea, neck pain, numbness, rash, sore throat, vomiting or weakness.   Diabetes  Pertinent negatives for hypoglycemia include no confusion, dizziness, headaches, nervousness/anxiousness, pallor, seizures, speech difficulty or tremors. Pertinent negatives for diabetes include no chest pain, no fatigue, no polydipsia, no polyphagia, no polyuria and no weakness.     Past Medical History:   Diagnosis Date    Diabetes mellitus, type 2     Herpes simplex virus (HSV) infection      Past Surgical History:   Procedure Laterality Date     SECTION      x3    Ectopic Pregnacy Removal  10/13/2016    SLEEVE GASTROPLASTY N/A 2023    Dr. Du    TUBAL LIGATION      bilateral salpingectomy     Family History   Problem Relation Name Age of Onset    Hypertension Mother      Cancer Mother          lung    Breast cancer Neg Hx      Colon cancer Neg Hx      Ovarian cancer Neg Hx      Thrombophilia Neg Hx       Social History[1]  Review of Systems   Constitutional:  Negative for activity change, appetite change, chills, diaphoresis, fatigue, fever and unexpected weight change.   HENT:  Negative for congestion, drooling, ear discharge, ear pain, facial swelling, hearing loss, mouth sores, nosebleeds, postnasal drip, rhinorrhea, sinus pressure, sneezing, sore throat, tinnitus, trouble swallowing and voice change.    Eyes:  Negative for photophobia, redness and visual disturbance.   Respiratory:  Negative for apnea, cough, choking, chest tightness, shortness of breath and wheezing.    Cardiovascular:  Negative for chest pain, palpitations and leg swelling.   Gastrointestinal:  Negative for abdominal distention, abdominal pain,  blood in stool, constipation, diarrhea, nausea, rectal pain and vomiting.   Endocrine: Negative for cold intolerance, heat intolerance, polydipsia, polyphagia and polyuria.   Genitourinary:  Negative for decreased urine volume, difficulty urinating, dysuria, flank pain, frequency, genital sores, hematuria, pelvic pain and urgency.   Musculoskeletal:  Negative for arthralgias, back pain, gait problem, joint swelling, myalgias, neck pain and neck stiffness.   Skin:  Negative for color change, pallor, rash and wound.   Allergic/Immunologic: Negative for food allergies and immunocompromised state.   Neurological:  Negative for dizziness, tremors, seizures, syncope, speech difficulty, weakness, light-headedness, numbness and headaches.   Hematological:  Negative for adenopathy. Does not bruise/bleed easily.   Psychiatric/Behavioral:  Negative for agitation, behavioral problems, confusion, decreased concentration, dysphoric mood, hallucinations, self-injury, sleep disturbance and suicidal ideas. The patient is not nervous/anxious and is not hyperactive.    All other systems reviewed and are negative.      Objective:      Physical Exam  Vitals and nursing note reviewed.   Constitutional:       General: She is not in acute distress.     Appearance: Normal appearance. She is well-developed. She is not diaphoretic.   HENT:      Head: Normocephalic and atraumatic.      Mouth/Throat:      Pharynx: No oropharyngeal exudate.   Eyes:      General: No scleral icterus.  Neck:      Thyroid: No thyromegaly.      Vascular: No carotid bruit or JVD.      Trachea: No tracheal deviation.   Cardiovascular:      Rate and Rhythm: Normal rate and regular rhythm.      Heart sounds: Normal heart sounds.   Pulmonary:      Effort: Pulmonary effort is normal. No respiratory distress.      Breath sounds: Normal breath sounds. No wheezing or rales.   Chest:      Chest wall: No tenderness.   Abdominal:      General: Bowel sounds are normal. There is  no distension.      Palpations: Abdomen is soft.      Tenderness: There is no abdominal tenderness. There is no guarding or rebound.   Musculoskeletal:         General: No tenderness. Normal range of motion.      Cervical back: Normal range of motion and neck supple.   Lymphadenopathy:      Cervical: No cervical adenopathy.   Skin:     General: Skin is warm and dry.      Coloration: Skin is not pale.      Findings: No erythema or rash.   Neurological:      Mental Status: She is alert and oriented to person, place, and time.      Cranial Nerves: No cranial nerve deficit.      Coordination: Coordination normal.   Psychiatric:         Behavior: Behavior normal.         Thought Content: Thought content normal.         Judgment: Judgment normal.         CMP  Sodium   Date Value Ref Range Status   10/15/2024 137 136 - 145 mmol/L Final     Potassium   Date Value Ref Range Status   10/15/2024 4.3 3.5 - 5.1 mmol/L Final     Chloride   Date Value Ref Range Status   10/15/2024 105 95 - 110 mmol/L Final     CO2   Date Value Ref Range Status   10/15/2024 24 23 - 29 mmol/L Final     Glucose   Date Value Ref Range Status   10/15/2024 78 70 - 110 mg/dL Final     BUN   Date Value Ref Range Status   10/15/2024 11 6 - 20 mg/dL Final     Creatinine   Date Value Ref Range Status   10/15/2024 0.8 0.5 - 1.4 mg/dL Final     Calcium   Date Value Ref Range Status   10/15/2024 9.1 8.7 - 10.5 mg/dL Final     Total Protein   Date Value Ref Range Status   10/15/2024 6.9 6.0 - 8.4 g/dL Final     Albumin   Date Value Ref Range Status   10/15/2024 3.5 3.5 - 5.2 g/dL Final     Total Bilirubin   Date Value Ref Range Status   10/15/2024 0.7 0.1 - 1.0 mg/dL Final     Comment:     For infants and newborns, interpretation of results should be based  on gestational age, weight and in agreement with clinical  observations.    Premature Infant recommended reference ranges:  Up to 24 hours.............<8.0 mg/dL  Up to 48 hours............<12.0 mg/dL  3-5  days..................<15.0 mg/dL  6-29 days.................<15.0 mg/dL       Alkaline Phosphatase   Date Value Ref Range Status   10/15/2024 67 55 - 135 U/L Final     AST   Date Value Ref Range Status   10/15/2024 13 10 - 40 U/L Final     ALT   Date Value Ref Range Status   10/15/2024 11 10 - 44 U/L Final     Anion Gap   Date Value Ref Range Status   10/15/2024 8 8 - 16 mmol/L Final     eGFR if    Date Value Ref Range Status   06/20/2022 >60.0 >60 mL/min/1.73 m^2 Final     eGFR if non    Date Value Ref Range Status   06/20/2022 >60.0 >60 mL/min/1.73 m^2 Final     Comment:     Calculation used to obtain the estimated glomerular filtration  rate (eGFR) is the CKD-EPI equation.        Lab Results   Component Value Date    WBC 4.06 10/15/2024    HGB 12.2 10/15/2024    HCT 36.4 (L) 10/15/2024    MCV 90 10/15/2024     10/15/2024     Lab Results   Component Value Date    CHOL 156 10/15/2024     Lab Results   Component Value Date    HDL 33 (L) 10/15/2024     Lab Results   Component Value Date    LDLCALC 113.0 10/15/2024     Lab Results   Component Value Date    TRIG 50 10/15/2024     Lab Results   Component Value Date    CHOLHDL 21.2 10/15/2024     Lab Results   Component Value Date    TSH 0.841 10/15/2024     Lab Results   Component Value Date    HGBA1C 5.3 10/15/2024     Assessment:       1. Controlled type 2 diabetes mellitus without complication, without long-term current use of insulin    2. Asthma in adult without complication, unspecified asthma severity, unspecified whether persistent    3. Hyperlipidemia associated with type 2 diabetes mellitus    4. Routine adult health maintenance    5. H/O gastric sleeve        Plan:   Controlled type 2 diabetes mellitus without complication, without long-term current use of insulin------off ozempic for 1 month----f/u diabetic clinic---------  -     Hemoglobin A1C; Future; Expected date: 05/08/2025    Asthma in adult without complication,  unspecified asthma severity, unspecified whether persistent    Hyperlipidemia associated with type 2 diabetes mellitus    Routine adult health maintenance    H/O gastric sleeve      Stable--------as above------consider resume ozempic-----------f/u 6 months------sees ob/gyn-       [1]   Social History  Socioeconomic History    Marital status:    Occupational History     Employer: [employer]   Tobacco Use    Smoking status: Never    Smokeless tobacco: Never   Substance and Sexual Activity    Alcohol use: Not Currently     Comment: occasionally    Drug use: No    Sexual activity: Yes     Partners: Male     Birth control/protection: Surgical, I.U.D.     Comment: Mirena    Social History Narrative    She works at [employer] as a .     Social Drivers of Health     Financial Resource Strain: Low Risk  (5/8/2025)    Overall Financial Resource Strain (CARDIA)     Difficulty of Paying Living Expenses: Not hard at all   Food Insecurity: No Food Insecurity (5/8/2025)    Hunger Vital Sign     Worried About Running Out of Food in the Last Year: Never true     Ran Out of Food in the Last Year: Never true   Transportation Needs: No Transportation Needs (5/8/2025)    PRAPARE - Transportation     Lack of Transportation (Medical): No     Lack of Transportation (Non-Medical): No   Physical Activity: Sufficiently Active (5/8/2025)    Exercise Vital Sign     Days of Exercise per Week: 3 days     Minutes of Exercise per Session: 100 min   Stress: Stress Concern Present (5/8/2025)    [employer] - Occupational Stress Questionnaire     Feeling of Stress : Rather much   Housing Stability: Low Risk  (5/8/2025)    Housing Stability Vital Sign     Unable to Pay for Housing in the Last Year: No     Number of Times Moved in the Last Year: 0     Homeless in the Last Year: No

## 2025-05-09 ENCOUNTER — RESULTS FOLLOW-UP (OUTPATIENT)
Dept: FAMILY MEDICINE | Facility: CLINIC | Age: 40
End: 2025-05-09

## 2025-05-14 ENCOUNTER — OFFICE VISIT (OUTPATIENT)
Dept: DIABETES | Facility: CLINIC | Age: 40
End: 2025-05-14
Payer: COMMERCIAL

## 2025-05-14 VITALS
SYSTOLIC BLOOD PRESSURE: 124 MMHG | HEIGHT: 63 IN | HEART RATE: 72 BPM | WEIGHT: 207.44 LBS | BODY MASS INDEX: 36.75 KG/M2 | DIASTOLIC BLOOD PRESSURE: 80 MMHG

## 2025-05-14 DIAGNOSIS — E78.5 HYPERLIPIDEMIA ASSOCIATED WITH TYPE 2 DIABETES MELLITUS: ICD-10-CM

## 2025-05-14 DIAGNOSIS — E66.01 CLASS 2 SEVERE OBESITY WITH SERIOUS COMORBIDITY AND BODY MASS INDEX (BMI) OF 38.0 TO 38.9 IN ADULT, UNSPECIFIED OBESITY TYPE: ICD-10-CM

## 2025-05-14 DIAGNOSIS — E53.8 VITAMIN B12 DEFICIENCY: ICD-10-CM

## 2025-05-14 DIAGNOSIS — E11.69 HYPERLIPIDEMIA ASSOCIATED WITH TYPE 2 DIABETES MELLITUS: ICD-10-CM

## 2025-05-14 DIAGNOSIS — E66.812 CLASS 2 SEVERE OBESITY WITH SERIOUS COMORBIDITY AND BODY MASS INDEX (BMI) OF 38.0 TO 38.9 IN ADULT, UNSPECIFIED OBESITY TYPE: ICD-10-CM

## 2025-05-14 DIAGNOSIS — F41.9 ANXIETY: ICD-10-CM

## 2025-05-14 DIAGNOSIS — E55.9 VITAMIN D DEFICIENCY: ICD-10-CM

## 2025-05-14 DIAGNOSIS — G47.33 OSA ON CPAP: ICD-10-CM

## 2025-05-14 DIAGNOSIS — Z90.3 H/O GASTRIC SLEEVE: ICD-10-CM

## 2025-05-14 DIAGNOSIS — E11.9 CONTROLLED TYPE 2 DIABETES MELLITUS WITHOUT COMPLICATION, WITHOUT LONG-TERM CURRENT USE OF INSULIN: Primary | ICD-10-CM

## 2025-05-14 LAB — GLUCOSE SERPL-MCNC: 91 MG/DL (ref 70–110)

## 2025-05-14 PROCEDURE — 82962 GLUCOSE BLOOD TEST: CPT | Mod: S$GLB,,, | Performed by: NURSE PRACTITIONER

## 2025-05-14 PROCEDURE — 99999 PR PBB SHADOW E&M-EST. PATIENT-LVL IV: CPT | Mod: PBBFAC,,, | Performed by: NURSE PRACTITIONER

## 2025-05-14 RX ORDER — CLOBETASOL PROPIONATE 0.5 MG/ML
SOLUTION TOPICAL 2 TIMES DAILY
COMMUNITY
Start: 2025-04-29

## 2025-05-14 RX ORDER — SERTRALINE HYDROCHLORIDE 50 MG/1
50 TABLET, FILM COATED ORAL DAILY
Qty: 30 TABLET | Refills: 5 | Status: SHIPPED | OUTPATIENT
Start: 2025-05-14 | End: 2026-05-14

## 2025-05-14 RX ORDER — TRIAMCINOLONE ACETONIDE 1 MG/G
OINTMENT TOPICAL 2 TIMES DAILY PRN
COMMUNITY
Start: 2025-04-29

## 2025-05-14 NOTE — PROGRESS NOTES
"Patient ID: Ralph Hughes is a 39 y.o. female.  Patient's current PCP is Ridge Quan MD.   Collaborating Physician: COLEMAN Larson MD    Chief Complaint: Diabetes Mellitus Consultation   HPI  Ralph Hughes is a 39 y.o. Black or  female presenting for a new consult with me, previously seen by KASHIF Lee NP for diabetes. LOV 10/2023   Patient has been diagnosed with type 2 diabetes since 2022 .    Pertinent to decision making is/are the following comorbidities:  HLD, obesity, THERON   Complications related to diabetes: none  Recent diabetes related hospitalizations: none   Personal history of pancreatitis: denies  Family history of pancreatic cancer in first-degree relative: denies  Family history of MTC/MEN/endocrine tumors: denies       Previous diabetes education: [facility] 2022   Current diet: eating 2 meals on average; 1 protein source in the morning; lunch is always a protein source sometimes; dinner is variable but is usually balanced. Drinking water mostly, 1/2 can soda 1-2 days per week.   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    0.5 mg weekly -- has been out x 1 month        Past failed treatment(s) include:   None     Her blood sugar in the clinic today was:   Lab Results   Component Value Date    POCGLU 91 05/14/2025       Blood glucose testing not checking regularly -- occasionally if feeling "off"   Any episodes of hypoglycemia? No   Glucose trends: --     Ralph Hughes presents to clinic today to discuss diabetes management.   She is feeling well today, needing medication refills. Was on Ozempic 0.5 mg weekly and tolerating well but has been out about 4 weeks. Noticing appetite and weight trending up since off " Ozempic.  S/p gastric sleeve 5/2023 -- she was about 280 lbs and decreased down to 197. She is working to a goal weight of 170's.   She has been trying to comply with diabetes diet; we discussed choosing nutrient-dense snack and meal options due to taking in smaller portions. She is doing well with maintaining water intake throughout the day.   She does inquire about anxiety effect on glucose. She has been more anxious lately as she is working, going to nursing school and has three children to care for. She previously took Zoloft and this worked well for her.     Last A1c 5.4% earlier this month.   Renal function WNL   Hyperlipidemia -      Due for updated fasting labs in October.   Eye exam due in July.   Foot exam to be completed today       Statin: Not taking  ACE/ARB: Not taking    Labs reviewed and are noted below.    Screening or Prevention Patient's value Goal Complete/Controlled?   HgA1C Testing and Control   Lab Results   Component Value Date    HGBA1C 5.4 05/08/2025      Annually/Less than 8% Yes   Lipid profile : 10/15/2024 Annually Yes   LDL control Lab Results   Component Value Date    LDLCALC 113.0 10/15/2024    Annually/Less than 100 mg/dl  No   Nephropathy screening Lab Results   Component Value Date    MICALBCREAT 2.6 10/15/2024     Lab Results   Component Value Date    PROTEINUA Negative 10/06/2016    Annually Yes   Blood pressure BP Readings from Last 1 Encounters:   05/14/25 124/80    Less than 140/90 Yes   Dilated retinal exam : 07/17/2024 Annually Yes    Foot exam   : 10/10/2023 Annually No       Wt Readings from Last 3 Encounters:   05/14/25 0755 94.1 kg (207 lb 7.3 oz)   05/08/25 0807 94.7 kg (208 lb 12.4 oz)   10/11/24 0926 97.8 kg (215 lb 9.8 oz)         Lab Results   Component Value Date    T3FREE 2.6 12/17/2021    FREET4 0.88 12/17/2021    TSH 0.841 10/15/2024    TOTALTESTOST 33 12/17/2021    XKUYZADD42 365 12/17/2021    CALCIUM 9.1 10/15/2024     No results found for:  ""CPEPTIDE"  No results found for: "GLUTAMICACID"  Glucose   Date Value Ref Range Status   10/15/2024 78 70 - 110 mg/dL Final     Anion Gap   Date Value Ref Range Status   10/15/2024 8 8 - 16 mmol/L Final     eGFR if    Date Value Ref Range Status   06/20/2022 >60.0 >60 mL/min/1.73 m^2 Final     eGFR if non    Date Value Ref Range Status   06/20/2022 >60.0 >60 mL/min/1.73 m^2 Final     Comment:     Calculation used to obtain the estimated glomerular filtration  rate (eGFR) is the CKD-EPI equation.              Review of patient's allergies indicates:  No Known Allergies  Social History[1]  Past Medical History:   Diagnosis Date    Diabetes mellitus, type 2     Herpes simplex virus (HSV) infection        Review of Systems   Constitutional:  Negative for diaphoresis, malaise/fatigue and weight loss.   Eyes:  Negative for blurred vision.   Respiratory:  Negative for shortness of breath.    Cardiovascular:  Negative for chest pain and leg swelling.   Gastrointestinal:  Negative for abdominal pain, constipation, diarrhea, heartburn, nausea and vomiting.   Genitourinary:  Negative for dysuria, frequency and urgency.   Musculoskeletal:  Negative for falls.   Skin:  Negative for rash.   Neurological:  Negative for dizziness, weakness and headaches.   Endo/Heme/Allergies:  Negative for polydipsia.   Psychiatric/Behavioral:  The patient is not nervous/anxious.          Physical Exam  Constitutional:       Appearance: Normal appearance. She is obese.   HENT:      Head: Normocephalic and atraumatic.   Neck:      Vascular: No carotid bruit.   Cardiovascular:      Rate and Rhythm: Normal rate and regular rhythm.      Pulses: Normal pulses.           Dorsalis pedis pulses are 2+ on the right side and 2+ on the left side.        Posterior tibial pulses are 2+ on the right side and 2+ on the left side.      Heart sounds: Normal heart sounds.   Pulmonary:      Effort: Pulmonary effort is normal.      " Breath sounds: Normal breath sounds.   Abdominal:      General: Abdomen is flat. Bowel sounds are normal.      Palpations: Abdomen is soft.   Musculoskeletal:      Cervical back: Normal range of motion and neck supple.      Right lower leg: No edema.      Left lower leg: No edema.      Right foot: No deformity.      Left foot: No deformity.   Feet:      Right foot:      Protective Sensation: 5 sites tested.  5 sites sensed.      Skin integrity: Skin integrity normal.      Toenail Condition: Right toenails are normal.      Left foot:      Protective Sensation: 5 sites tested.  5 sites sensed.      Skin integrity: Skin integrity normal.      Toenail Condition: Left toenails are normal.   Skin:     General: Skin is warm and dry.      Capillary Refill: Capillary refill takes less than 2 seconds.   Neurological:      General: No focal deficit present.      Mental Status: She is alert and oriented to person, place, and time.   Psychiatric:         Mood and Affect: Mood normal.         Behavior: Behavior normal.         Thought Content: Thought content normal.         Judgment: Judgment normal.             Assessment and Plan:   Ralph was seen today for diabetes mellitus.    Diagnoses and all orders for this visit:    Controlled type 2 diabetes mellitus without complication, without long-term current use of insulin  -     POCT Glucose, Hand-Held Device  -     Microalbumin/Creatinine Ratio, Urine; Future  -     Lipid Panel; Future  -     Hemoglobin A1C; Future  -     CBC Auto Differential; Future  -     Comprehensive Metabolic Panel; Future    Vitamin D deficiency    H/O gastric sleeve  -     Vitamin D; Future    Vitamin B12 deficiency  -     Vitamin B12; Future    Hyperlipidemia associated with type 2 diabetes mellitus  -     TSH; Future  -     T4, Free; Future    Anxiety  -     sertraline (ZOLOFT) 50 MG tablet; Take 1 tablet (50 mg total) by mouth once daily.    Class 2 severe obesity with serious comorbidity and body  mass index (BMI) of 38.0 to 38.9 in adult, unspecified obesity type    THERON on CPAP         Treatment plan for today's visit:   -- Restart Ozempic 0.25 mg weekly (18 clicks) for 2 weeks   Increase to 0.5 mg weekly (36 clicks) thereafter   Let me know if you feel like you want to increase to 1 mg after about 4-6 weeks on the 0.5 mg dose     -- Restart Zoloft 50 mg daily -- this medication takes 4-6 weeks to see an effect. Follow up with PCP for reevaluation.     -- Hyperlipidemia: discussed ASCVD risk in patients with diabetes. Goal LDL <70. Discussed statin initiation with patient; she prefers to try harder with diet and exercise and repeat labs in October. We will readdress after f/u labs.    -- Vitamin D 1000 iu daily   -- Vitamin B12 1000 mcg daily       Health maintenance:   -- Schedule eye exam for 2025   -- Schedule fasting labs in October       Patient education:   - Carbohydrates: Limit to 30-45 grams with each meal. Never eat carbs by themselves - always add protein. Make snacks low carb or non-carb only.    - Blood sugar goals:   Fastin-130 mg/dl  2 hours after meal:  mg/dl  Before bed: 100-150 mg/dl    - Carry glucose tablets/soft peppermints/regular juice or Coke product with you at all times to treat a low blood sugar episode (less than 70).  If your blood sugar is between 50-70, Chew 4 tablets or drink 1/2 cup of juice or regular Coke product.   If your blood sugar is below 50, double the treatment.   Re-check blood sugar in 15 minutes. If still low, repeat this.   Always call the clinic to give an update for any low blood sugar episodes.    - Exercise: Goal is 150 minutes per week, which is about 30 minutes/day, 5 days/week. Start slowly and increase as tolerated.        Follow up: 2025     Visit today included increased complexity associated with the care of the episodic problems addressed and managing the longitudinal care of the patient due to the serious and/or complex  managed problem(s)       Lauren O. Landry, FNP-C [facility]            [1]   Social History  Socioeconomic History    Marital status:    Occupational History     Employer: [employer]   Tobacco Use    Smoking status: Never    Smokeless tobacco: Never   Substance and Sexual Activity    Alcohol use: Not Currently     Comment: occasionally    Drug use: No    Sexual activity: Yes     Partners: Male     Birth control/protection: Surgical, I.U.D.     Comment: Mirena    Social History Narrative    She works at [employer] as a .     Social Drivers of Health     Financial Resource Strain: Low Risk  (5/8/2025)    Overall Financial Resource Strain (CARDIA)     Difficulty of Paying Living Expenses: Not hard at all   Food Insecurity: No Food Insecurity (5/8/2025)    Hunger Vital Sign     Worried About Running Out of Food in the Last Year: Never true     Ran Out of Food in the Last Year: Never true   Transportation Needs: No Transportation Needs (5/8/2025)    PRAPARE - Transportation     Lack of Transportation (Medical): No     Lack of Transportation (Non-Medical): No   Physical Activity: Sufficiently Active (5/8/2025)    Exercise Vital Sign     Days of Exercise per Week: 3 days     Minutes of Exercise per Session: 100 min   Stress: Stress Concern Present (5/8/2025)    [employer] [employer] - Occupational Stress Questionnaire     Feeling of Stress : Rather much   Housing Stability: Low Risk  (5/8/2025)    Housing Stability Vital Sign     Unable to Pay for Housing in the Last Year: No     Number of Times Moved in the Last Year: 0     Homeless in the Last Year: No

## 2025-05-14 NOTE — PATIENT INSTRUCTIONS
Medications adjusted for today's visit include:   Restart Ozempic 0.25 mg weekly (18 clicks) for 2 weeks   Increase to 0.5 mg weekly (36 clicks) thereafter   Let me know if you feel like you want to increase to 1 mg after about 4-6 weeks on the 0.5 mg dose   Zoloft 50 mg daily -- this medication takes 4-6 weeks to see an effect.     -- Vitamin D 1000 iu daily   -- Vitamin B12 1000 mcg daily       -- Schedule fasting labs in October with a visit afterwards       Health maintenance:   Schedule eye exam for 2025     Patient education:     Carbohydrates: Limit to 30-45 grams with each meal. Never eat carbs by themselves - always add protein. Make snacks low carb or non-carb only.    Blood sugar goals:   Fastin-130 mg/dl  2 hours after meal:  mg/dl  Before bed: 100-150 mg/dl    Carry glucose tablets/soft peppermints/regular juice or Coke product with you at all times to treat a low blood sugar episode (less than 70).  If your blood sugar is between 50-70, Chew 4 tablets or drink 1/2 cup of juice or regular Coke product.   If your blood sugar is below 50, double the treatment.   Re-check blood sugar in 15 minutes. If still low, repeat this.   Always call the clinic to give an update for any low blood sugar episodes.    Exercise: Goal is 150 minutes per week, which is about 30 minutes/day, 5 days/week. Start slowly and increase as tolerated.

## 2025-06-07 ENCOUNTER — OFFICE VISIT (OUTPATIENT)
Dept: INTERNAL MEDICINE | Facility: CLINIC | Age: 40
End: 2025-06-07
Payer: COMMERCIAL

## 2025-06-07 VITALS
HEIGHT: 63 IN | SYSTOLIC BLOOD PRESSURE: 128 MMHG | RESPIRATION RATE: 20 BRPM | BODY MASS INDEX: 36.17 KG/M2 | WEIGHT: 204.13 LBS | DIASTOLIC BLOOD PRESSURE: 82 MMHG | OXYGEN SATURATION: 98 % | TEMPERATURE: 98 F | HEART RATE: 90 BPM

## 2025-06-07 DIAGNOSIS — T14.8XXA MUSCLE STRAIN: Primary | ICD-10-CM

## 2025-06-07 PROCEDURE — 3008F BODY MASS INDEX DOCD: CPT | Mod: CPTII,S$GLB,, | Performed by: INTERNAL MEDICINE

## 2025-06-07 PROCEDURE — 3072F LOW RISK FOR RETINOPATHY: CPT | Mod: CPTII,S$GLB,, | Performed by: INTERNAL MEDICINE

## 2025-06-07 PROCEDURE — 99999 PR PBB SHADOW E&M-EST. PATIENT-LVL V: CPT | Mod: PBBFAC,,, | Performed by: INTERNAL MEDICINE

## 2025-06-07 PROCEDURE — 3079F DIAST BP 80-89 MM HG: CPT | Mod: CPTII,S$GLB,, | Performed by: INTERNAL MEDICINE

## 2025-06-07 PROCEDURE — 3074F SYST BP LT 130 MM HG: CPT | Mod: CPTII,S$GLB,, | Performed by: INTERNAL MEDICINE

## 2025-06-07 PROCEDURE — 1160F RVW MEDS BY RX/DR IN RCRD: CPT | Mod: CPTII,S$GLB,, | Performed by: INTERNAL MEDICINE

## 2025-06-07 PROCEDURE — 99213 OFFICE O/P EST LOW 20 MIN: CPT | Mod: S$GLB,,, | Performed by: INTERNAL MEDICINE

## 2025-06-07 PROCEDURE — 1159F MED LIST DOCD IN RCRD: CPT | Mod: CPTII,S$GLB,, | Performed by: INTERNAL MEDICINE

## 2025-06-07 PROCEDURE — 3044F HG A1C LEVEL LT 7.0%: CPT | Mod: CPTII,S$GLB,, | Performed by: INTERNAL MEDICINE

## 2025-06-07 RX ORDER — CYCLOBENZAPRINE HCL 10 MG
10 TABLET ORAL 2 TIMES DAILY PRN
Qty: 20 TABLET | Refills: 0 | Status: SHIPPED | OUTPATIENT
Start: 2025-06-07 | End: 2025-06-17

## 2025-06-07 RX ORDER — MELOXICAM 15 MG/1
15 TABLET ORAL DAILY
Qty: 30 TABLET | Refills: 0 | Status: SHIPPED | OUTPATIENT
Start: 2025-06-07

## 2025-06-07 NOTE — LETTER
June 7, 2025    Ralph Hughes  [address] [place] [address]         [address]  [address] [facility] [address]  Phone: [phone]  Fax: [phone] June 7, 2025     Patient: Ralph Hughes   YOB: 1985   Date of Visit: 6/7/2025       To Whom It May Concern:    It is my medical opinion that Ralph Hughes may return to work on Radha 10, 2025.    If you have any questions or concerns, please don't hesitate to call.    Sincerely,        Declan Cornell MD

## 2025-06-07 NOTE — PROGRESS NOTES
"Subjective:       Patient ID: Ralph Hughes is a 39 y.o. female.    Chief Complaint: Leg Pain (Left thigh )      HPI:  History of Present Illness    Patient presents today for right leg pain that started last week.    She reports right leg pain that began approximately one week ago, with progression over the past day and reaching peak intensity this morning. The pain is localized to the anterior aspect of the left leg with extension underneath. 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 She has difficulty walking with a sensation of improper bending and a slight drag in her gait. She reports increased difficulty with activities of daily living, requiring support rails when using the toilet. This morning, she initiated self-treatment with ice application and Tylenol for the first time since symptom onset.         Review of Systems    Objective:   /82   Pulse 90   Temp 97.5 °F (36.4 °C) (Tympanic)   Resp 20   Ht 5' 3" (1.6 m)   Wt 92.6 kg (204 lb 2.3 oz)   LMP 05/27/2025 (Approximate)   SpO2 98%   BMI 36.16 kg/m²      Physical Exam  Constitutional:       General: She is not in acute distress.     Appearance: Normal appearance. She is well-developed. She is not ill-appearing.   HENT:      Head: Normocephalic and atraumatic.   Eyes:      Extraocular Movements: Extraocular movements intact.   Pulmonary:      Effort: Pulmonary effort is normal. No respiratory distress.   Musculoskeletal:      Cervical back: Normal range of motion.      Comments: Tenderness to palpation of the muscles of the anterior/anteriori medial upper thigh.  Discomfort in anterior thigh with rotation of the hip and flexion of the hip on left.  All findings left sided.   Skin:     Coloration: Skin is not jaundiced or pale.      Findings: No rash.   Neurological:      General: No focal deficit present.      " Mental Status: She is alert and oriented to person, place, and time.      Cranial Nerves: No cranial nerve deficit.   Psychiatric:         Behavior: Behavior normal.         Thought Content: Thought content normal.             Assessment:       1. Muscle strain        Plan:   No problem-specific Assessment & Plan notes found for this encounter.    Ralph was seen today for leg pain.    Diagnoses and all orders for this visit:    Muscle strain  -     meloxicam (MOBIC) 15 MG tablet; Take 1 tablet (15 mg total) by mouth once daily.  -     cyclobenzaprine (FLEXERIL) 10 MG tablet; Take 1 tablet (10 mg total) by mouth 2 (two) times daily as needed for Muscle spasms.      Assessment & Plan    STRAIN OF MUSCLES AND FASCIA AT RIGHT THIGH LEVEL:  - Assessed leg pain as likely muscle pull or strain in right thigh.  - Pain started last week and has worsened.  - Physical exam revealed pain upon bending the knee and pressing on the front of the thigh.  - Initiated conservative management with prescription-strength anti-inflammatory to be taken daily and muscle relaxant to be taken at bedtime, possibly twice daily as needed.  - Advised patient to take Tylenol for breakthrough pain if needed and cautioned against taking other NSAIDs like Advil or Aleve.  - Patient may try soaking in a hot tub, though effects may vary.  - Advised to avoid sitting non-stop and to attempt gentle movement of the affected leg.  - Deferred physical therapy initially but will reassess if symptoms do not improve.  - Provided work excuse for Sunday to allow rest and recovery.    FOLLOW-UP:  - Scheduled follow up on Tuesday, October 10th for return to work evaluation.  - Instructed patient to contact Dr. Quan on Monday if symptoms are not improving significantly to potentially arrange physical therapy.         No follow-ups on file.    This note was generated with the assistance of ambient listening technology. Verbal consent was obtained by the patient  and accompanying visitor(s) for the recording of patient appointment to facilitate this note

## 2025-06-11 ENCOUNTER — OFFICE VISIT (OUTPATIENT)
Dept: URGENT CARE | Facility: CLINIC | Age: 40
End: 2025-06-11
Payer: COMMERCIAL

## 2025-06-11 VITALS
RESPIRATION RATE: 18 BRPM | BODY MASS INDEX: 37.34 KG/M2 | SYSTOLIC BLOOD PRESSURE: 145 MMHG | HEIGHT: 62 IN | HEART RATE: 84 BPM | DIASTOLIC BLOOD PRESSURE: 87 MMHG | TEMPERATURE: 98 F | OXYGEN SATURATION: 99 % | WEIGHT: 202.94 LBS

## 2025-06-11 DIAGNOSIS — H10.9 BACTERIAL CONJUNCTIVITIS OF RIGHT EYE: Primary | ICD-10-CM

## 2025-06-11 PROCEDURE — 99213 OFFICE O/P EST LOW 20 MIN: CPT | Mod: S$GLB,,, | Performed by: PHYSICIAN ASSISTANT

## 2025-06-11 RX ORDER — CIPROFLOXACIN HYDROCHLORIDE 3 MG/ML
2 SOLUTION/ DROPS OPHTHALMIC 4 TIMES DAILY
Qty: 5 ML | Refills: 0 | Status: SHIPPED | OUTPATIENT
Start: 2025-06-11 | End: 2025-06-18

## 2025-06-11 NOTE — PROGRESS NOTES
"Subjective:      Patient ID: Ralph Hughes is a 39 y.o. female.    Vitals:  height is 5' 2" (1.575 m) and weight is 92 kg (202 lb 14.6 oz). Her tympanic temperature is 97.5 °F (36.4 °C). Her blood pressure is 145/87 (abnormal) and her pulse is 84. Her respiration is 18 and oxygen saturation is 99%.     Chief Complaint: Eye Problem    Presents with right eye irritation, drainage and redness. Rates pain 8/10. Symptoms started on 06/10/25.  Reports copious crusted discharge and eyelid swelling upon waking up this morning but states that has improved since then.  Denies any trauma to the eye, foreign body sensation, use of contacts, vision changes.  Has no taken any medication.     Eye Problem   The right eye is affected. This is a new problem. The current episode started yesterday. The problem has been unchanged. There was no injury mechanism. The pain is at a severity of 8/10. The pain is moderate. There is No known exposure to pink eye. She Does not wear contacts. Associated symptoms include an eye discharge, eye redness and itching. Pertinent negatives include no blurred vision, double vision, fever, foreign body sensation, nausea, photophobia, recent URI or vomiting. She has tried nothing for the symptoms. The treatment provided no relief.       Constitution: Negative for chills and fever.   HENT: Negative.     Eyes:  Positive for eye discharge, eye itching, eye pain and eye redness. Negative for eye trauma, foreign body in eye, photophobia, vision loss, double vision and blurred vision.   Gastrointestinal:  Negative for nausea and vomiting.   Skin: Negative.    Neurological: Negative.       Objective:     Physical Exam   Constitutional: She appears well-developed.  Non-toxic appearance. She does not appear ill. No distress.   HENT:   Head: Normocephalic and atraumatic. Head is without right periorbital erythema.   Ears:   Right Ear: External ear normal.   Left Ear: External ear normal.   Nose: Nose normal. "   Eyes: EOM and lids are normal. Pupils are equal, round, and reactive to light. No visual field deficit is present. Right eye exhibits discharge. Right eye exhibits no hordeolum. No foreign body present in the right eye. Right conjunctiva is injected. No scleral icterus. Extraocular movement intact   Neck: Neck supple.   Pulmonary/Chest: Effort normal.   Abdominal: Normal appearance.   Musculoskeletal: Normal range of motion.         General: Normal range of motion.   Neurological: no focal deficit. She is alert. She displays no weakness. Gait normal.   Skin: Skin is warm, dry, not diaphoretic, not pale and no rash.   Psychiatric: Her behavior is normal.       Assessment:     1. Bacterial conjunctivitis of right eye        Plan:       Bacterial conjunctivitis of right eye  -     ciprofloxacin HCl (CILOXAN) 0.3 % ophthalmic solution; Place 2 drops into the right eye 4 (four) times daily. for 7 days  Dispense: 5 mL; Refill: 0          Medical Decision Making:   Urgent Care Management:  Can apply cool compresses to the eye as needed.  Use eyedrops as prescribed for full 7 days.  Make sure to wash hands thoroughly after touching eye or any eye discharge to prevent spreading of infection.  Clean pillowcase.  Close follow-up with PCP versus ophthalmologist if symptoms worsen or fail to improve with treatment.

## 2025-06-11 NOTE — LETTER
June 11, 2025      [facility]  [address]  [facility] [address]  Phone: [phone]       Patient: Ralph Hughes   YOB: 1985  Date of Visit: 06/11/2025    To Whom It May Concern:    Parish Hughes  was at [facility] on 06/11/2025. The patient may return to work/school on 06/12/25 with no restrictions. If you have any questions or concerns, or if I can be of further assistance, please do not hesitate to contact me.    Sincerely,    Gretchen Henderson PA-C

## 2025-07-30 ENCOUNTER — OFFICE VISIT (OUTPATIENT)
Dept: URGENT CARE | Facility: CLINIC | Age: 40
End: 2025-07-30
Payer: COMMERCIAL

## 2025-07-30 VITALS
BODY MASS INDEX: 36.6 KG/M2 | HEART RATE: 84 BPM | RESPIRATION RATE: 20 BRPM | TEMPERATURE: 98 F | WEIGHT: 198.88 LBS | HEIGHT: 62 IN | SYSTOLIC BLOOD PRESSURE: 120 MMHG | DIASTOLIC BLOOD PRESSURE: 72 MMHG | OXYGEN SATURATION: 98 %

## 2025-07-30 DIAGNOSIS — R59.1 LYMPHADENOPATHY: Primary | ICD-10-CM

## 2025-07-30 PROCEDURE — 99213 OFFICE O/P EST LOW 20 MIN: CPT | Mod: S$GLB,,, | Performed by: NURSE PRACTITIONER

## 2025-07-30 RX ORDER — SULFAMETHOXAZOLE AND TRIMETHOPRIM 800; 160 MG/1; MG/1
1 TABLET ORAL 2 TIMES DAILY
Qty: 20 TABLET | Refills: 0 | Status: SHIPPED | OUTPATIENT
Start: 2025-07-30 | End: 2025-07-31

## 2025-07-31 DIAGNOSIS — R59.1 LYMPHADENOPATHY: Primary | ICD-10-CM

## 2025-07-31 RX ORDER — SULFAMETHOXAZOLE AND TRIMETHOPRIM 800; 160 MG/1; MG/1
1 TABLET ORAL 2 TIMES DAILY
Qty: 20 TABLET | Refills: 0 | Status: SHIPPED | OUTPATIENT
Start: 2025-07-31 | End: 2025-08-10

## 2025-07-31 NOTE — PATIENT INSTRUCTIONS
Keep area clean and dry  Wash with antibacterial soap and water  Apply warm towels to affected area  Initiate Bactrim course and take as directed  Alternate Tylenol and Ibuprofen as directed for pain/comfort  Follow up as needed/with worsening  Follow up with PCP if symptoms worsen/persist in 3-5 days

## 2025-07-31 NOTE — PROGRESS NOTES
"Subjective:      Patient ID: Ralph Hughes is a 40 y.o. female.    Vitals:  height is 5' 2" (1.575 m) and weight is 90.2 kg (198 lb 13.7 oz). Her tympanic temperature is 97.8 °F (36.6 °C). Her blood pressure is 120/72 and her pulse is 84. Her respiration is 20 and oxygen saturation is 98%.     Chief Complaint: Mass    40 year old female presents for evaluation of a swollen, hard and painful knot to the back her neck. Noted on Monday 7/27. Reports worsening since onset due to increased pain. Applied rubbing alcohol without relief. No known injury/trauma.    Mass  This is a new problem. The current episode started in the past 7 days. The problem occurs constantly. Associated symptoms include neck pain. Pertinent negatives include no rash. Nothing aggravates the symptoms. She has tried nothing for the symptoms. The treatment provided no relief.       Constitution: Negative.   HENT: Negative.     Neck: Positive for neck pain and painful lymph nodes.   Cardiovascular: Negative.    Eyes: Negative.    Respiratory: Negative.     Gastrointestinal: Negative.    Endocrine: negative.   Genitourinary: Negative.    Skin:  Positive for erythema. Negative for color change, pale, rash, wound, abrasion, laceration, lesion, puncture wound, bruising, abscess, avulsion and hives.   Allergic/Immunologic: Negative.  Negative for hives.   Neurological: Negative.    Hematologic/Lymphatic: Positive for swollen lymph nodes.   Psychiatric/Behavioral: Negative.        Objective:     Physical Exam   Constitutional: She is oriented to person, place, and time. She is cooperative.  Non-toxic appearance. She does not appear ill. No distress. normalawake  HENT:   Head: Normocephalic and atraumatic.       Ears:   Right Ear: Hearing normal.   Left Ear: Hearing normal.   Patient presents with dean, not excessively tight. Palpable tender nodule noted to occipital node. No abscess noted.       Comments: Patient presents with dean, not " excessively tight. Palpable tender nodule noted to occipital node. No abscess noted.   Eyes: Conjunctivae are normal. Pupils are equal, round, and reactive to light. Right eye exhibits no discharge. Left eye exhibits no discharge. No scleral icterus. Extraocular movement intact   Cardiovascular: Normal rate.   Abdominal: Normal appearance.   Musculoskeletal:         General: Tenderness present.      Cervical back: She exhibits tenderness.   Lymphadenopathy:        Head (right side): Occipital adenopathy present.   Neurological: no focal deficit. She is alert and oriented to person, place, and time.   Skin: Skin is warm, dry, not diaphoretic, not pale and no rash. erythema No bruising no jaundice  Psychiatric: Her behavior is normal. Mood, judgment and thought content normal.   Nursing note and vitals reviewed.      Assessment:     1. Lymphadenopathy        Plan:       Lymphadenopathy  -     sulfamethoxazole-trimethoprim 800-160mg (BACTRIM DS) 800-160 mg Tab; Take 1 tablet by mouth 2 (two) times daily. for 10 days  Dispense: 20 tablet; Refill: 0      Patient presents for evaluation of occipital lymphadenopathy. Will treat empirically for bacterial infection, manage symptoms, prevent worsening, and follow up with PCP for further evaluation if symptoms worsen/persist. Discussed with patient who verbalizes understanding. Patient is stable for discharge.     Patient Instructions   Keep area clean and dry  Wash with antibacterial soap and water  Apply warm towels to affected area  Initiate Bactrim course and take as directed  Alternate Tylenol and Ibuprofen as directed for pain/comfort  Follow up as needed/with worsening  Follow up with PCP if symptoms worsen/persist in 3-5 days

## 2025-08-05 ENCOUNTER — OFFICE VISIT (OUTPATIENT)
Dept: OBSTETRICS AND GYNECOLOGY | Facility: CLINIC | Age: 40
End: 2025-08-05
Payer: COMMERCIAL

## 2025-08-05 VITALS
HEIGHT: 62 IN | DIASTOLIC BLOOD PRESSURE: 68 MMHG | BODY MASS INDEX: 36.39 KG/M2 | WEIGHT: 197.75 LBS | SYSTOLIC BLOOD PRESSURE: 112 MMHG

## 2025-08-05 DIAGNOSIS — Z30.431 IUD CHECK UP: ICD-10-CM

## 2025-08-05 DIAGNOSIS — Z01.419 WELL WOMAN EXAM WITH ROUTINE GYNECOLOGICAL EXAM: Primary | ICD-10-CM

## 2025-08-05 PROCEDURE — 99999 PR PBB SHADOW E&M-EST. PATIENT-LVL III: CPT | Mod: PBBFAC,,, | Performed by: OBSTETRICS & GYNECOLOGY

## 2025-08-05 NOTE — PROGRESS NOTES
Subjective     Patient ID: Ralph Hughes is a 40 y.o. female.    Chief Complaint:  Well Woman      History of Present Illness  Gynecologic Exam  The patient's pertinent negatives include no genital itching, genital lesions, genital odor, genital rash, missed menses, pelvic pain, vaginal bleeding or vaginal discharge. The patient is experiencing no pain. She is not pregnant. Pertinent negatives include no abdominal pain, anorexia, back pain, chills, constipation, diarrhea, discolored urine, dysuria, fever, flank pain, frequency, headaches, hematuria, nausea, painful intercourse, rash, urgency or vomiting. She is sexually active. No, her partner does not have an STD. Her menstrual history has been regular. Her past medical history is significant for an abdominal surgery, a  section, an ectopic pregnancy and herpes simplex. There is no history of menorrhagia.     Annual Exam-Premenopausal  Patient presents for annual exam. The patient has no complaints today. The patient is sexually active (BTL). GYN screening history: last pap: approximate date  and was normal. The patient wears seatbelts: yes. The patient participates in regular exercise: yes. Has the patient ever been transfused or tattooed?: yes. The patient reports that there is not domestic violence in her life.  Menses are controlled with Mirena (inserted in ).    GYN & OB History  Patient's last menstrual period was 2025.   Date of Last Pap: 2023    OB History    Para Term  AB Living   4 3 2 1 1 3   SAB IAB Ectopic Multiple Live Births   0 0 1 0 3      # Outcome Date GA Lbr Vipin/2nd Weight Sex Type Anes PTL Lv   4 Term 17 39w1d  3.26 kg (7 lb 3 oz) M CS-LTranv Spinal  WENDI   3  06/15/11 34w0d  2.155 kg (4 lb 12 oz) F CS-LTranv EPI Y WENDI   2 Term 09 40w0d  3.459 kg (7 lb 10 oz) F CS-LTranv EPI N WENDI      Complications: Fetal Intolerance   1 Ectopic                Review of Systems  Review of Systems    Constitutional:  Negative for activity change, appetite change, chills, fatigue, fever and unexpected weight change.   Respiratory:  Negative for shortness of breath.    Cardiovascular:  Negative for chest pain, palpitations and leg swelling.   Gastrointestinal:  Negative for abdominal pain, anorexia, bloating, blood in stool, constipation, diarrhea, nausea and vomiting.   Genitourinary:  Negative for dysmenorrhea, dyspareunia, dysuria, flank pain, frequency, genital sores, hematuria, menorrhagia, menstrual problem, missed menses, pelvic pain, urgency, vaginal bleeding, vaginal discharge, vaginal pain, urinary incontinence, postcoital bleeding, vaginal dryness and vaginal odor.   Musculoskeletal:  Negative for back pain.   Integumentary:  Negative for rash, breast mass, nipple discharge, breast skin changes and breast tenderness.   Neurological:  Negative for syncope and headaches.   Breast: Negative for asymmetry, lump, mass, mastodynia, nipple discharge, skin changes and tenderness         Objective   Physical Exam:   Constitutional: She is oriented to person, place, and time. She appears well-developed and well-nourished. No distress.    HENT:   Head: Normocephalic and atraumatic.    Eyes: Pupils are equal, round, and reactive to light. EOM are normal.     Cardiovascular:  Normal rate, regular rhythm and normal heart sounds.             Pulmonary/Chest: Effort normal and breath sounds normal.        Abdominal: Soft. Bowel sounds are normal. She exhibits no distension. There is no abdominal tenderness.     Genitourinary:    Vagina, uterus, right adnexa and left adnexa normal.      Pelvic exam was performed with patient supine.   There is no rash, tenderness, lesion or injury on the right labia. There is no rash, tenderness, lesion or injury on the left labia. Cervix is normal. Right adnexum displays no mass, no tenderness and no fullness. Left adnexum displays no mass, no tenderness and no fullness. No erythema,  vaginal discharge, tenderness or bleeding in the vagina.    No foreign body in the vagina.      No signs of injury in the vagina.   Cervix exhibits no motion tenderness, no discharge and no friability. Uterus is not deviated, not enlarged and not tender. IUD strings visualized.          Musculoskeletal: Normal range of motion and moves all extremeties. No tenderness or edema.       Neurological: She is alert and oriented to person, place, and time.    Skin: Skin is warm and dry.    Psychiatric: She has a normal mood and affect. Her behavior is normal. Thought content normal.            Assessment and Plan     1. Well woman exam with routine gynecological exam    2. IUD check up           Plan:  Well woman exam with routine gynecological exam  -     Pt was counseled on cervical/vaginal screening guidelines and recommendations.  Last pap NILM on 2023.  As per current [facility]P guidelines, next pap is due 2026.  -     Pt was advised on current breast cancer screening recommendations.    -     Follow up with PCP for routine health maintenance needs.    IUD check up  -     Menses well controlled with Mirena.  Happy with results.  Due for removal in 2027.      Follow up in about 1 year (around 8/5/2026).

## 2025-08-25 ENCOUNTER — PATIENT MESSAGE (OUTPATIENT)
Dept: OBSTETRICS AND GYNECOLOGY | Facility: CLINIC | Age: 40
End: 2025-08-25
Payer: COMMERCIAL

## 2025-09-02 ENCOUNTER — HOSPITAL ENCOUNTER (OUTPATIENT)
Dept: RADIOLOGY | Facility: CLINIC | Age: 40
Discharge: HOME | End: 2025-09-02
Attending: PHYSICIAN ASSISTANT
Payer: COMMERCIAL

## 2025-09-02 ENCOUNTER — OFFICE VISIT (OUTPATIENT)
Dept: URGENT CARE | Facility: CLINIC | Age: 40
End: 2025-09-02
Payer: COMMERCIAL

## 2025-09-02 VITALS
DIASTOLIC BLOOD PRESSURE: 92 MMHG | BODY MASS INDEX: 36.39 KG/M2 | WEIGHT: 197.75 LBS | TEMPERATURE: 99 F | SYSTOLIC BLOOD PRESSURE: 139 MMHG | OXYGEN SATURATION: 99 % | RESPIRATION RATE: 20 BRPM | HEART RATE: 88 BPM | HEIGHT: 62 IN

## 2025-09-02 DIAGNOSIS — S89.91XA INJURY OF RIGHT KNEE, INITIAL ENCOUNTER: Primary | ICD-10-CM

## 2025-09-02 DIAGNOSIS — S89.91XA INJURY OF RIGHT KNEE, INITIAL ENCOUNTER: ICD-10-CM

## 2025-09-02 PROCEDURE — 99213 OFFICE O/P EST LOW 20 MIN: CPT | Mod: S$GLB,,, | Performed by: PHYSICIAN ASSISTANT

## 2025-09-02 PROCEDURE — 73562 X-RAY EXAM OF KNEE 3: CPT | Mod: RT,S$GLB,, | Performed by: RADIOLOGY

## (undated) DEVICE — TAPE SILK 3IN

## (undated) DEVICE — PAD ABD 8X10 STERILE

## (undated) DEVICE — DRESSING AQUACEL AG ADV 3.5X6

## (undated) DEVICE — TAPE SURG MEDIPORE 6X72IN